# Patient Record
Sex: FEMALE | Race: WHITE | Employment: UNEMPLOYED | URBAN - METROPOLITAN AREA
[De-identification: names, ages, dates, MRNs, and addresses within clinical notes are randomized per-mention and may not be internally consistent; named-entity substitution may affect disease eponyms.]

---

## 2017-12-03 PROBLEM — Z15.89 MTHFR MUTATION: Status: ACTIVE | Noted: 2017-12-03

## 2018-12-21 ENCOUNTER — HOSPITAL ENCOUNTER (EMERGENCY)
Age: 48
Discharge: HOME | End: 2018-12-21
Payer: COMMERCIAL

## 2018-12-21 VITALS
DIASTOLIC BLOOD PRESSURE: 98 MMHG | HEIGHT: 63 IN | RESPIRATION RATE: 18 BRPM | WEIGHT: 240 LBS | HEART RATE: 102 BPM | SYSTOLIC BLOOD PRESSURE: 133 MMHG | OXYGEN SATURATION: 96 % | TEMPERATURE: 98.6 F | BODY MASS INDEX: 42.52 KG/M2

## 2018-12-21 DIAGNOSIS — J06.9 ACUTE UPPER RESPIRATORY INFECTION: ICD-10-CM

## 2018-12-21 DIAGNOSIS — M79.601 RIGHT ARM PAIN: Primary | ICD-10-CM

## 2018-12-21 LAB
MONO TEST: NEGATIVE
STREP GRP A PCR: NEGATIVE

## 2018-12-21 PROCEDURE — 87880 STREP A ASSAY W/OPTIC: CPT

## 2018-12-21 PROCEDURE — 6370000000 HC RX 637 (ALT 250 FOR IP): Performed by: NURSE PRACTITIONER

## 2018-12-21 PROCEDURE — 36415 COLL VENOUS BLD VENIPUNCTURE: CPT

## 2018-12-21 PROCEDURE — 99283 EMERGENCY DEPT VISIT LOW MDM: CPT

## 2018-12-21 PROCEDURE — 86308 HETEROPHILE ANTIBODY SCREEN: CPT

## 2018-12-21 RX ORDER — METOPROLOL SUCCINATE 25 MG/1
50 TABLET, EXTENDED RELEASE ORAL ONCE
Status: COMPLETED | OUTPATIENT
Start: 2018-12-21 | End: 2018-12-21

## 2018-12-21 RX ORDER — IBUPROFEN 400 MG/1
400 TABLET ORAL ONCE
Status: COMPLETED | OUTPATIENT
Start: 2018-12-21 | End: 2018-12-21

## 2018-12-21 RX ADMIN — METOPROLOL SUCCINATE 50 MG: 25 TABLET, EXTENDED RELEASE ORAL at 00:39

## 2018-12-21 RX ADMIN — IBUPROFEN 400 MG: 400 TABLET, FILM COATED ORAL at 00:38

## 2018-12-21 ASSESSMENT — PAIN SCALES - GENERAL
PAINLEVEL_OUTOF10: 4
PAINLEVEL_OUTOF10: 7

## 2018-12-21 ASSESSMENT — PAIN DESCRIPTION - ONSET: ONSET: ON-GOING

## 2018-12-21 ASSESSMENT — PAIN DESCRIPTION - ORIENTATION: ORIENTATION: RIGHT;LEFT

## 2018-12-21 ASSESSMENT — PAIN DESCRIPTION - PAIN TYPE: TYPE: ACUTE PAIN

## 2018-12-21 ASSESSMENT — PAIN DESCRIPTION - FREQUENCY: FREQUENCY: CONTINUOUS

## 2018-12-21 ASSESSMENT — PAIN DESCRIPTION - DESCRIPTORS: DESCRIPTORS: ACHING

## 2019-01-09 ENCOUNTER — HOSPITAL ENCOUNTER (OUTPATIENT)
Age: 49
Discharge: HOME | End: 2019-01-09
Payer: COMMERCIAL

## 2019-01-09 LAB
ALBUMIN SERPL-MCNC: 4.4 G/DL (ref 3.5–5.2)
ALP BLD-CCNC: 99 U/L (ref 35–104)
ALT SERPL-CCNC: 24 U/L (ref 0–32)
ANION GAP SERPL CALCULATED.3IONS-SCNC: 14 MMOL/L (ref 7–16)
AST SERPL-CCNC: 28 U/L (ref 0–31)
BACTERIA: ABNORMAL /HPF
BASOPHILS ABSOLUTE: 0.06 E9/L (ref 0–0.2)
BASOPHILS RELATIVE PERCENT: 0.6 % (ref 0–2)
BILIRUB SERPL-MCNC: 0.5 MG/DL (ref 0–1.2)
BILIRUBIN URINE: NEGATIVE
BLOOD, URINE: NEGATIVE
BUN BLDV-MCNC: 16 MG/DL (ref 6–20)
CALCIUM SERPL-MCNC: 9.4 MG/DL (ref 8.6–10.2)
CASTS: ABNORMAL /LPF
CHLORIDE BLD-SCNC: 96 MMOL/L (ref 98–107)
CHOLESTEROL, FASTING: 385 MG/DL (ref 0–199)
CLARITY: ABNORMAL
CO2: 28 MMOL/L (ref 22–29)
COLOR: YELLOW
CREAT SERPL-MCNC: 0.8 MG/DL (ref 0.5–1)
EOSINOPHILS ABSOLUTE: 0.18 E9/L (ref 0.05–0.5)
EOSINOPHILS RELATIVE PERCENT: 1.9 % (ref 0–6)
EPITHELIAL CELLS, UA: ABNORMAL /HPF
GFR AFRICAN AMERICAN: >60
GFR NON-AFRICAN AMERICAN: >60 ML/MIN/1.73
GLUCOSE FASTING: 257 MG/DL (ref 74–99)
GLUCOSE URINE: 100 MG/DL
HBA1C MFR BLD: 10 % (ref 4–5.6)
HCT VFR BLD CALC: 45 % (ref 34–48)
HDLC SERPL-MCNC: 45 MG/DL
HEMOGLOBIN: 15.1 G/DL (ref 11.5–15.5)
IMMATURE GRANULOCYTES #: 0.05 E9/L
IMMATURE GRANULOCYTES %: 0.5 % (ref 0–5)
KETONES, URINE: NEGATIVE MG/DL
LDL CHOLESTEROL CALCULATED: ABNORMAL MG/DL (ref 0–99)
LEUKOCYTE ESTERASE, URINE: NEGATIVE
LYMPHOCYTES ABSOLUTE: 2.58 E9/L (ref 1.5–4)
LYMPHOCYTES RELATIVE PERCENT: 27.5 % (ref 20–42)
MCH RBC QN AUTO: 28.5 PG (ref 26–35)
MCHC RBC AUTO-ENTMCNC: 33.6 % (ref 32–34.5)
MCV RBC AUTO: 84.9 FL (ref 80–99.9)
MICROALBUMIN UR-MCNC: 61.5 MG/L
MONOCYTES ABSOLUTE: 0.43 E9/L (ref 0.1–0.95)
MONOCYTES RELATIVE PERCENT: 4.6 % (ref 2–12)
NEUTROPHILS ABSOLUTE: 6.09 E9/L (ref 1.8–7.3)
NEUTROPHILS RELATIVE PERCENT: 64.9 % (ref 43–80)
NITRITE, URINE: NEGATIVE
PDW BLD-RTO: 12.1 FL (ref 11.5–15)
PH UA: 6 (ref 5–9)
PLATELET # BLD: 344 E9/L (ref 130–450)
PMV BLD AUTO: 10 FL (ref 7–12)
POTASSIUM SERPL-SCNC: 4.5 MMOL/L (ref 3.5–5)
PROTEIN UA: NEGATIVE MG/DL
RBC # BLD: 5.3 E12/L (ref 3.5–5.5)
RBC UA: ABNORMAL /HPF (ref 0–2)
SODIUM BLD-SCNC: 138 MMOL/L (ref 132–146)
SPECIFIC GRAVITY UA: 1.01 (ref 1–1.03)
TOTAL PROTEIN: 8.2 G/DL (ref 6.4–8.3)
TRIGLYCERIDE, FASTING: 659 MG/DL (ref 0–149)
TSH SERPL DL<=0.05 MIU/L-ACNC: 2.03 UIU/ML (ref 0.27–4.2)
UROBILINOGEN, URINE: 0.2 E.U./DL
VITAMIN D 25-HYDROXY: 15 NG/ML (ref 30–100)
VLDLC SERPL CALC-MCNC: ABNORMAL MG/DL
WBC # BLD: 9.4 E9/L (ref 4.5–11.5)
WBC UA: ABNORMAL /HPF (ref 0–5)

## 2019-01-09 PROCEDURE — 82306 VITAMIN D 25 HYDROXY: CPT

## 2019-01-09 PROCEDURE — 82044 UR ALBUMIN SEMIQUANTITATIVE: CPT

## 2019-01-09 PROCEDURE — 80053 COMPREHEN METABOLIC PANEL: CPT

## 2019-01-09 PROCEDURE — 36415 COLL VENOUS BLD VENIPUNCTURE: CPT

## 2019-01-09 PROCEDURE — 80061 LIPID PANEL: CPT

## 2019-01-09 PROCEDURE — 85025 COMPLETE CBC W/AUTO DIFF WBC: CPT

## 2019-01-09 PROCEDURE — 83036 HEMOGLOBIN GLYCOSYLATED A1C: CPT

## 2019-01-09 PROCEDURE — 84443 ASSAY THYROID STIM HORMONE: CPT

## 2019-01-09 PROCEDURE — 81001 URINALYSIS AUTO W/SCOPE: CPT

## 2019-01-14 ENCOUNTER — APPOINTMENT (OUTPATIENT)
Dept: CT IMAGING | Age: 49
DRG: 445 | End: 2019-01-14
Payer: COMMERCIAL

## 2019-01-14 ENCOUNTER — HOSPITAL ENCOUNTER (INPATIENT)
Age: 49
LOS: 1 days | Discharge: OTHER INSTITUTION | DRG: 445 | End: 2019-01-16
Attending: EMERGENCY MEDICINE | Admitting: INTERNAL MEDICINE
Payer: COMMERCIAL

## 2019-01-14 DIAGNOSIS — R00.0 TACHYCARDIA: ICD-10-CM

## 2019-01-14 DIAGNOSIS — E87.20 LACTIC ACIDOSIS: ICD-10-CM

## 2019-01-14 DIAGNOSIS — K83.8 PNEUMOBILIA: Primary | ICD-10-CM

## 2019-01-14 DIAGNOSIS — R74.8 ELEVATED LIVER ENZYMES: ICD-10-CM

## 2019-01-14 LAB
ALBUMIN SERPL-MCNC: 4.1 G/DL (ref 3.5–5.2)
ALP BLD-CCNC: 149 U/L (ref 35–104)
ALT SERPL-CCNC: 153 U/L (ref 0–32)
ANION GAP SERPL CALCULATED.3IONS-SCNC: 18 MMOL/L (ref 7–16)
AST SERPL-CCNC: 653 U/L (ref 0–31)
BACTERIA: ABNORMAL /HPF
BASOPHILS ABSOLUTE: 0.03 E9/L (ref 0–0.2)
BASOPHILS RELATIVE PERCENT: 0.3 % (ref 0–2)
BILIRUB SERPL-MCNC: 1.4 MG/DL (ref 0–1.2)
BILIRUBIN DIRECT: 1.1 MG/DL (ref 0–0.3)
BILIRUBIN URINE: NEGATIVE
BILIRUBIN, INDIRECT: 0.3 MG/DL (ref 0–1)
BLOOD, URINE: NEGATIVE
BUN BLDV-MCNC: 15 MG/DL (ref 6–20)
CALCIUM SERPL-MCNC: 9.5 MG/DL (ref 8.6–10.2)
CHLORIDE BLD-SCNC: 92 MMOL/L (ref 98–107)
CLARITY: ABNORMAL
CO2: 24 MMOL/L (ref 22–29)
COLOR: YELLOW
CREAT SERPL-MCNC: 0.7 MG/DL (ref 0.5–1)
EOSINOPHILS ABSOLUTE: 0.11 E9/L (ref 0.05–0.5)
EOSINOPHILS RELATIVE PERCENT: 1.3 % (ref 0–6)
GFR AFRICAN AMERICAN: >60
GFR NON-AFRICAN AMERICAN: >60 ML/MIN/1.73
GLUCOSE BLD-MCNC: 355 MG/DL (ref 74–99)
GLUCOSE URINE: >=1000 MG/DL
HCT VFR BLD CALC: 43.9 % (ref 34–48)
HEMOGLOBIN: 15 G/DL (ref 11.5–15.5)
IMMATURE GRANULOCYTES #: 0.03 E9/L
IMMATURE GRANULOCYTES %: 0.3 % (ref 0–5)
KETONES, URINE: ABNORMAL MG/DL
LACTIC ACID: 4.7 MMOL/L (ref 0.5–2.2)
LACTIC ACID: 6 MMOL/L (ref 0.5–2.2)
LEUKOCYTE ESTERASE, URINE: NEGATIVE
LIPASE: 84 U/L (ref 13–60)
LYMPHOCYTES ABSOLUTE: 1.27 E9/L (ref 1.5–4)
LYMPHOCYTES RELATIVE PERCENT: 14.6 % (ref 20–42)
MCH RBC QN AUTO: 28.8 PG (ref 26–35)
MCHC RBC AUTO-ENTMCNC: 34.2 % (ref 32–34.5)
MCV RBC AUTO: 84.3 FL (ref 80–99.9)
MONOCYTES ABSOLUTE: 0.3 E9/L (ref 0.1–0.95)
MONOCYTES RELATIVE PERCENT: 3.4 % (ref 2–12)
NEUTROPHILS ABSOLUTE: 6.97 E9/L (ref 1.8–7.3)
NEUTROPHILS RELATIVE PERCENT: 80.1 % (ref 43–80)
NITRITE, URINE: NEGATIVE
PDW BLD-RTO: 12 FL (ref 11.5–15)
PH UA: 6 (ref 5–9)
PLATELET # BLD: 338 E9/L (ref 130–450)
PMV BLD AUTO: 10.1 FL (ref 7–12)
POTASSIUM REFLEX MAGNESIUM: 4.1 MMOL/L (ref 3.5–5)
PROTEIN UA: ABNORMAL MG/DL
RBC # BLD: 5.21 E12/L (ref 3.5–5.5)
RBC UA: ABNORMAL /HPF (ref 0–2)
SODIUM BLD-SCNC: 134 MMOL/L (ref 132–146)
SPECIFIC GRAVITY UA: 1.02 (ref 1–1.03)
TOTAL PROTEIN: 7.8 G/DL (ref 6.4–8.3)
UROBILINOGEN, URINE: 0.2 E.U./DL
WBC # BLD: 8.7 E9/L (ref 4.5–11.5)
WBC UA: ABNORMAL /HPF (ref 0–5)

## 2019-01-14 PROCEDURE — 80076 HEPATIC FUNCTION PANEL: CPT

## 2019-01-14 PROCEDURE — 81001 URINALYSIS AUTO W/SCOPE: CPT

## 2019-01-14 PROCEDURE — 2580000003 HC RX 258: Performed by: STUDENT IN AN ORGANIZED HEALTH CARE EDUCATION/TRAINING PROGRAM

## 2019-01-14 PROCEDURE — 74176 CT ABD & PELVIS W/O CONTRAST: CPT

## 2019-01-14 PROCEDURE — S0028 INJECTION, FAMOTIDINE, 20 MG: HCPCS | Performed by: STUDENT IN AN ORGANIZED HEALTH CARE EDUCATION/TRAINING PROGRAM

## 2019-01-14 PROCEDURE — 6360000002 HC RX W HCPCS: Performed by: STUDENT IN AN ORGANIZED HEALTH CARE EDUCATION/TRAINING PROGRAM

## 2019-01-14 PROCEDURE — 83690 ASSAY OF LIPASE: CPT

## 2019-01-14 PROCEDURE — 96375 TX/PRO/DX INJ NEW DRUG ADDON: CPT

## 2019-01-14 PROCEDURE — 36415 COLL VENOUS BLD VENIPUNCTURE: CPT

## 2019-01-14 PROCEDURE — 85025 COMPLETE CBC W/AUTO DIFF WBC: CPT

## 2019-01-14 PROCEDURE — 99285 EMERGENCY DEPT VISIT HI MDM: CPT

## 2019-01-14 PROCEDURE — 80048 BASIC METABOLIC PNL TOTAL CA: CPT

## 2019-01-14 PROCEDURE — 6360000002 HC RX W HCPCS: Performed by: EMERGENCY MEDICINE

## 2019-01-14 PROCEDURE — 83605 ASSAY OF LACTIC ACID: CPT

## 2019-01-14 PROCEDURE — 96374 THER/PROPH/DIAG INJ IV PUSH: CPT

## 2019-01-14 RX ORDER — FENTANYL CITRATE 50 UG/ML
50 INJECTION, SOLUTION INTRAMUSCULAR; INTRAVENOUS ONCE
Status: COMPLETED | OUTPATIENT
Start: 2019-01-14 | End: 2019-01-14

## 2019-01-14 RX ORDER — 0.9 % SODIUM CHLORIDE 0.9 %
1000 INTRAVENOUS SOLUTION INTRAVENOUS ONCE
Status: COMPLETED | OUTPATIENT
Start: 2019-01-14 | End: 2019-01-14

## 2019-01-14 RX ORDER — METOCLOPRAMIDE HYDROCHLORIDE 5 MG/ML
10 INJECTION INTRAMUSCULAR; INTRAVENOUS ONCE
Status: COMPLETED | OUTPATIENT
Start: 2019-01-14 | End: 2019-01-14

## 2019-01-14 RX ORDER — 0.9 % SODIUM CHLORIDE 0.9 %
1000 INTRAVENOUS SOLUTION INTRAVENOUS ONCE
Status: COMPLETED | OUTPATIENT
Start: 2019-01-14 | End: 2019-01-15

## 2019-01-14 RX ORDER — ONDANSETRON 2 MG/ML
4 INJECTION INTRAMUSCULAR; INTRAVENOUS ONCE
Status: COMPLETED | OUTPATIENT
Start: 2019-01-14 | End: 2019-01-14

## 2019-01-14 RX ORDER — FENTANYL CITRATE 50 UG/ML
50 INJECTION, SOLUTION INTRAMUSCULAR; INTRAVENOUS ONCE
Status: COMPLETED | OUTPATIENT
Start: 2019-01-15 | End: 2019-01-15

## 2019-01-14 RX ORDER — METOCLOPRAMIDE HYDROCHLORIDE 5 MG/ML
10 INJECTION INTRAMUSCULAR; INTRAVENOUS ONCE
Status: COMPLETED | OUTPATIENT
Start: 2019-01-15 | End: 2019-01-15

## 2019-01-14 RX ADMIN — ONDANSETRON 4 MG: 2 INJECTION INTRAMUSCULAR; INTRAVENOUS at 23:05

## 2019-01-14 RX ADMIN — METOCLOPRAMIDE 10 MG: 5 INJECTION, SOLUTION INTRAMUSCULAR; INTRAVENOUS at 21:17

## 2019-01-14 RX ADMIN — SODIUM CHLORIDE 1000 ML: 9 INJECTION, SOLUTION INTRAVENOUS at 21:17

## 2019-01-14 RX ADMIN — SODIUM CHLORIDE 1000 ML: 9 INJECTION, SOLUTION INTRAVENOUS at 23:06

## 2019-01-14 RX ADMIN — FAMOTIDINE 20 MG: 10 INJECTION, SOLUTION INTRAVENOUS at 21:17

## 2019-01-14 RX ADMIN — FENTANYL CITRATE 50 MCG: 50 INJECTION, SOLUTION INTRAMUSCULAR; INTRAVENOUS at 21:17

## 2019-01-14 ASSESSMENT — ENCOUNTER SYMPTOMS
EYE DISCHARGE: 0
EYE PAIN: 0
WHEEZING: 0
NAUSEA: 1
SINUS PRESSURE: 0
DIARRHEA: 0
COUGH: 0
ABDOMINAL PAIN: 1
ABDOMINAL DISTENTION: 0
EYE REDNESS: 0
SHORTNESS OF BREATH: 0
VOMITING: 0
SORE THROAT: 0
BACK PAIN: 0

## 2019-01-14 ASSESSMENT — PAIN DESCRIPTION - LOCATION
LOCATION: ABDOMEN
LOCATION: ABDOMEN

## 2019-01-14 ASSESSMENT — PAIN SCALES - GENERAL
PAINLEVEL_OUTOF10: 8
PAINLEVEL_OUTOF10: 8

## 2019-01-14 ASSESSMENT — PAIN DESCRIPTION - ORIENTATION
ORIENTATION: RIGHT
ORIENTATION: RIGHT;MID;UPPER

## 2019-01-14 ASSESSMENT — PAIN DESCRIPTION - PAIN TYPE
TYPE: ACUTE PAIN
TYPE: ACUTE PAIN

## 2019-01-14 ASSESSMENT — PAIN DESCRIPTION - DESCRIPTORS
DESCRIPTORS: CONSTANT
DESCRIPTORS: DISCOMFORT

## 2019-01-14 ASSESSMENT — PAIN DESCRIPTION - FREQUENCY: FREQUENCY: CONTINUOUS

## 2019-01-15 ENCOUNTER — APPOINTMENT (OUTPATIENT)
Dept: CT IMAGING | Age: 49
DRG: 445 | End: 2019-01-15
Payer: COMMERCIAL

## 2019-01-15 PROBLEM — K83.8 PNEUMOBILIA: Status: ACTIVE | Noted: 2019-01-15

## 2019-01-15 LAB
CHP ED QC CHECK: NORMAL
GLUCOSE BLD-MCNC: 289 MG/DL
LACTIC ACID: 3.4 MMOL/L (ref 0.5–2.2)
LACTIC ACID: 5 MMOL/L (ref 0.5–2.2)
LACTIC ACID: 5 MMOL/L (ref 0.5–2.2)
METER GLUCOSE: 200 MG/DL (ref 74–99)
METER GLUCOSE: 289 MG/DL (ref 74–99)

## 2019-01-15 PROCEDURE — 2580000003 HC RX 258: Performed by: INTERNAL MEDICINE

## 2019-01-15 PROCEDURE — 2580000003 HC RX 258: Performed by: STUDENT IN AN ORGANIZED HEALTH CARE EDUCATION/TRAINING PROGRAM

## 2019-01-15 PROCEDURE — 74174 CTA ABD&PLVS W/CONTRAST: CPT

## 2019-01-15 PROCEDURE — 2580000003 HC RX 258: Performed by: EMERGENCY MEDICINE

## 2019-01-15 PROCEDURE — 6360000002 HC RX W HCPCS: Performed by: STUDENT IN AN ORGANIZED HEALTH CARE EDUCATION/TRAINING PROGRAM

## 2019-01-15 PROCEDURE — 36415 COLL VENOUS BLD VENIPUNCTURE: CPT

## 2019-01-15 PROCEDURE — 96375 TX/PRO/DX INJ NEW DRUG ADDON: CPT

## 2019-01-15 PROCEDURE — 96376 TX/PRO/DX INJ SAME DRUG ADON: CPT

## 2019-01-15 PROCEDURE — 6360000002 HC RX W HCPCS: Performed by: INTERNAL MEDICINE

## 2019-01-15 PROCEDURE — 6370000000 HC RX 637 (ALT 250 FOR IP): Performed by: INTERNAL MEDICINE

## 2019-01-15 PROCEDURE — 6360000002 HC RX W HCPCS: Performed by: EMERGENCY MEDICINE

## 2019-01-15 PROCEDURE — 6370000000 HC RX 637 (ALT 250 FOR IP): Performed by: STUDENT IN AN ORGANIZED HEALTH CARE EDUCATION/TRAINING PROGRAM

## 2019-01-15 PROCEDURE — 6360000004 HC RX CONTRAST MEDICATION: Performed by: RADIOLOGY

## 2019-01-15 PROCEDURE — 82962 GLUCOSE BLOOD TEST: CPT

## 2019-01-15 PROCEDURE — 83605 ASSAY OF LACTIC ACID: CPT

## 2019-01-15 PROCEDURE — 2000000000 HC ICU R&B

## 2019-01-15 RX ORDER — NICOTINE POLACRILEX 4 MG
15 LOZENGE BUCCAL PRN
Status: DISCONTINUED | OUTPATIENT
Start: 2019-01-15 | End: 2019-01-16 | Stop reason: HOSPADM

## 2019-01-15 RX ORDER — ONDANSETRON 2 MG/ML
4 INJECTION INTRAMUSCULAR; INTRAVENOUS ONCE
Status: COMPLETED | OUTPATIENT
Start: 2019-01-15 | End: 2019-01-15

## 2019-01-15 RX ORDER — DEXTROSE MONOHYDRATE 25 G/50ML
12.5 INJECTION, SOLUTION INTRAVENOUS PRN
Status: DISCONTINUED | OUTPATIENT
Start: 2019-01-15 | End: 2019-01-16 | Stop reason: HOSPADM

## 2019-01-15 RX ORDER — ONDANSETRON 2 MG/ML
4 INJECTION INTRAMUSCULAR; INTRAVENOUS EVERY 6 HOURS PRN
Status: DISCONTINUED | OUTPATIENT
Start: 2019-01-15 | End: 2019-01-16 | Stop reason: HOSPADM

## 2019-01-15 RX ORDER — SODIUM CHLORIDE 0.9 % (FLUSH) 0.9 %
10 SYRINGE (ML) INJECTION EVERY 12 HOURS SCHEDULED
Status: DISCONTINUED | OUTPATIENT
Start: 2019-01-15 | End: 2019-01-16 | Stop reason: HOSPADM

## 2019-01-15 RX ORDER — MEPERIDINE HYDROCHLORIDE 25 MG/ML
25 INJECTION INTRAMUSCULAR; INTRAVENOUS; SUBCUTANEOUS EVERY 4 HOURS PRN
Status: DISCONTINUED | OUTPATIENT
Start: 2019-01-15 | End: 2019-01-16 | Stop reason: HOSPADM

## 2019-01-15 RX ORDER — 0.9 % SODIUM CHLORIDE 0.9 %
1000 INTRAVENOUS SOLUTION INTRAVENOUS ONCE
Status: COMPLETED | OUTPATIENT
Start: 2019-01-15 | End: 2019-01-15

## 2019-01-15 RX ORDER — PROMETHAZINE HYDROCHLORIDE 25 MG/ML
25 INJECTION, SOLUTION INTRAMUSCULAR; INTRAVENOUS EVERY 6 HOURS PRN
Status: DISCONTINUED | OUTPATIENT
Start: 2019-01-15 | End: 2019-01-16 | Stop reason: HOSPADM

## 2019-01-15 RX ORDER — PANTOPRAZOLE SODIUM 40 MG/1
40 TABLET, DELAYED RELEASE ORAL NIGHTLY
Status: DISCONTINUED | OUTPATIENT
Start: 2019-01-15 | End: 2019-01-16 | Stop reason: HOSPADM

## 2019-01-15 RX ORDER — ASPIRIN 81 MG/1
81 TABLET, CHEWABLE ORAL NIGHTLY
Status: DISCONTINUED | OUTPATIENT
Start: 2019-01-15 | End: 2019-01-16 | Stop reason: HOSPADM

## 2019-01-15 RX ORDER — PROMETHAZINE HYDROCHLORIDE 25 MG/ML
12.5 INJECTION, SOLUTION INTRAMUSCULAR; INTRAVENOUS ONCE
Status: COMPLETED | OUTPATIENT
Start: 2019-01-15 | End: 2019-01-15

## 2019-01-15 RX ORDER — ACETAMINOPHEN 500 MG
1000 TABLET ORAL ONCE
Status: COMPLETED | OUTPATIENT
Start: 2019-01-15 | End: 2019-01-15

## 2019-01-15 RX ORDER — PROMETHAZINE HYDROCHLORIDE 25 MG/ML
25 INJECTION, SOLUTION INTRAMUSCULAR; INTRAVENOUS ONCE
Status: COMPLETED | OUTPATIENT
Start: 2019-01-15 | End: 2019-01-15

## 2019-01-15 RX ORDER — SODIUM CHLORIDE 9 MG/ML
INJECTION, SOLUTION INTRAVENOUS CONTINUOUS
Status: DISCONTINUED | OUTPATIENT
Start: 2019-01-15 | End: 2019-01-16 | Stop reason: HOSPADM

## 2019-01-15 RX ORDER — ONDANSETRON 2 MG/ML
8 INJECTION INTRAMUSCULAR; INTRAVENOUS ONCE
Status: COMPLETED | OUTPATIENT
Start: 2019-01-15 | End: 2019-01-15

## 2019-01-15 RX ORDER — DEXTROSE MONOHYDRATE 50 MG/ML
100 INJECTION, SOLUTION INTRAVENOUS PRN
Status: DISCONTINUED | OUTPATIENT
Start: 2019-01-15 | End: 2019-01-16 | Stop reason: HOSPADM

## 2019-01-15 RX ORDER — METOPROLOL SUCCINATE 50 MG/1
50 TABLET, EXTENDED RELEASE ORAL 2 TIMES DAILY
Status: DISCONTINUED | OUTPATIENT
Start: 2019-01-15 | End: 2019-01-16 | Stop reason: HOSPADM

## 2019-01-15 RX ORDER — ONDANSETRON 2 MG/ML
4 INJECTION INTRAMUSCULAR; INTRAVENOUS EVERY 6 HOURS PRN
Status: DISCONTINUED | OUTPATIENT
Start: 2019-01-15 | End: 2019-01-15

## 2019-01-15 RX ORDER — SODIUM CHLORIDE 0.9 % (FLUSH) 0.9 %
10 SYRINGE (ML) INJECTION PRN
Status: DISCONTINUED | OUTPATIENT
Start: 2019-01-15 | End: 2019-01-16 | Stop reason: HOSPADM

## 2019-01-15 RX ADMIN — ASPIRIN 81 MG: 81 TABLET, CHEWABLE ORAL at 23:05

## 2019-01-15 RX ADMIN — PIPERACILLIN SODIUM AND TAZOBACTAM SODIUM 4.5 G: 4; .5 INJECTION, POWDER, LYOPHILIZED, FOR SOLUTION INTRAVENOUS at 15:15

## 2019-01-15 RX ADMIN — FENTANYL CITRATE 50 MCG: 50 INJECTION, SOLUTION INTRAMUSCULAR; INTRAVENOUS at 00:06

## 2019-01-15 RX ADMIN — METOCLOPRAMIDE 10 MG: 5 INJECTION, SOLUTION INTRAMUSCULAR; INTRAVENOUS at 00:06

## 2019-01-15 RX ADMIN — ONDANSETRON 4 MG: 2 INJECTION INTRAMUSCULAR; INTRAVENOUS at 08:41

## 2019-01-15 RX ADMIN — Medication 10 ML: at 23:05

## 2019-01-15 RX ADMIN — METOPROLOL SUCCINATE 50 MG: 50 TABLET, EXTENDED RELEASE ORAL at 23:05

## 2019-01-15 RX ADMIN — SODIUM CHLORIDE: 9 INJECTION, SOLUTION INTRAVENOUS at 13:42

## 2019-01-15 RX ADMIN — PROMETHAZINE HYDROCHLORIDE 12.5 MG: 25 INJECTION INTRAMUSCULAR; INTRAVENOUS at 02:12

## 2019-01-15 RX ADMIN — ONDANSETRON 4 MG: 2 INJECTION INTRAMUSCULAR; INTRAVENOUS at 19:14

## 2019-01-15 RX ADMIN — IOPAMIDOL 125 ML: 755 INJECTION, SOLUTION INTRAVENOUS at 20:01

## 2019-01-15 RX ADMIN — PANTOPRAZOLE SODIUM 40 MG: 40 TABLET, DELAYED RELEASE ORAL at 23:05

## 2019-01-15 RX ADMIN — ACETAMINOPHEN 1000 MG: 500 TABLET, FILM COATED ORAL at 02:18

## 2019-01-15 RX ADMIN — SODIUM CHLORIDE 1000 ML: 9 INJECTION, SOLUTION INTRAVENOUS at 01:05

## 2019-01-15 RX ADMIN — DEXTROSE MONOHYDRATE 3.38 G: 50 INJECTION, SOLUTION INTRAVENOUS at 23:05

## 2019-01-15 RX ADMIN — PROMETHAZINE HYDROCHLORIDE 25 MG: 25 INJECTION INTRAMUSCULAR; INTRAVENOUS at 12:30

## 2019-01-15 RX ADMIN — INSULIN LISPRO 2 UNITS: 100 INJECTION, SOLUTION INTRAVENOUS; SUBCUTANEOUS at 23:21

## 2019-01-15 RX ADMIN — ONDANSETRON 8 MG: 2 INJECTION INTRAMUSCULAR; INTRAVENOUS at 01:05

## 2019-01-15 RX ADMIN — SODIUM CHLORIDE 1000 ML: 9 INJECTION, SOLUTION INTRAVENOUS at 08:04

## 2019-01-15 ASSESSMENT — PAIN SCALES - GENERAL
PAINLEVEL_OUTOF10: 3
PAINLEVEL_OUTOF10: 0
PAINLEVEL_OUTOF10: 5

## 2019-01-15 ASSESSMENT — PAIN DESCRIPTION - PAIN TYPE
TYPE: ACUTE PAIN
TYPE: ACUTE PAIN

## 2019-01-15 ASSESSMENT — PAIN DESCRIPTION - LOCATION
LOCATION: ABDOMEN
LOCATION: ABDOMEN

## 2019-01-15 ASSESSMENT — PAIN DESCRIPTION - DESCRIPTORS
DESCRIPTORS: DISCOMFORT
DESCRIPTORS: DISCOMFORT

## 2019-01-16 ENCOUNTER — HOSPITAL ENCOUNTER (INPATIENT)
Age: 49
LOS: 2 days | Discharge: HOME | DRG: 445 | End: 2019-01-18
Attending: TRANSPLANT SURGERY | Admitting: TRANSPLANT SURGERY
Payer: COMMERCIAL

## 2019-01-16 ENCOUNTER — APPOINTMENT (OUTPATIENT)
Dept: MRI IMAGING | Age: 49
DRG: 445 | End: 2019-01-16
Payer: COMMERCIAL

## 2019-01-16 ENCOUNTER — HOSPITAL ENCOUNTER (OUTPATIENT)
Age: 49
Discharge: HOME | End: 2019-01-16
Payer: COMMERCIAL

## 2019-01-16 VITALS
OXYGEN SATURATION: 97 % | SYSTOLIC BLOOD PRESSURE: 101 MMHG | TEMPERATURE: 98.5 F | HEART RATE: 82 BPM | DIASTOLIC BLOOD PRESSURE: 69 MMHG | HEIGHT: 63 IN | RESPIRATION RATE: 16 BRPM | WEIGHT: 232.1 LBS | BODY MASS INDEX: 41.12 KG/M2

## 2019-01-16 PROBLEM — K83.1 OBSTRUCTIVE JAUNDICE: Status: ACTIVE | Noted: 2019-01-16

## 2019-01-16 PROBLEM — E66.01 MORBID OBESITY (HCC): Status: ACTIVE | Noted: 2019-01-16

## 2019-01-16 PROBLEM — I77.1 HEPATIC ARTERY STENOSIS (HCC): Status: ACTIVE | Noted: 2019-01-16

## 2019-01-16 LAB
ALBUMIN SERPL-MCNC: 2.7 G/DL (ref 3.5–5.2)
ALP BLD-CCNC: 129 U/L (ref 35–104)
ALT SERPL-CCNC: 219 U/L (ref 0–32)
AMYLASE: 16 U/L (ref 20–100)
ANION GAP SERPL CALCULATED.3IONS-SCNC: 12 MMOL/L (ref 7–16)
AST SERPL-CCNC: 251 U/L (ref 0–31)
BASOPHILS ABSOLUTE: 0.02 E9/L (ref 0–0.2)
BASOPHILS RELATIVE PERCENT: 0.3 % (ref 0–2)
BILIRUB SERPL-MCNC: 2.6 MG/DL (ref 0–1.2)
BUN BLDV-MCNC: 8 MG/DL (ref 6–20)
CALCIUM SERPL-MCNC: 8.1 MG/DL (ref 8.6–10.2)
CHLORIDE BLD-SCNC: 98 MMOL/L (ref 98–107)
CO2: 27 MMOL/L (ref 22–29)
CREAT SERPL-MCNC: 0.8 MG/DL (ref 0.5–1)
EOSINOPHILS ABSOLUTE: 0.04 E9/L (ref 0.05–0.5)
EOSINOPHILS RELATIVE PERCENT: 0.6 % (ref 0–6)
GFR AFRICAN AMERICAN: >60
GFR NON-AFRICAN AMERICAN: >60 ML/MIN/1.73
GLUCOSE BLD-MCNC: 207 MG/DL (ref 74–99)
HBA1C MFR BLD: 10.3 % (ref 4–5.6)
HCT VFR BLD CALC: 34.3 % (ref 34–48)
HEMOGLOBIN: 11.6 G/DL (ref 11.5–15.5)
IMMATURE GRANULOCYTES #: 0.03 E9/L
IMMATURE GRANULOCYTES %: 0.5 % (ref 0–5)
LACTIC ACID: 1.2 MMOL/L (ref 0.5–2.2)
LIPASE: 26 U/L (ref 13–60)
LYMPHOCYTES ABSOLUTE: 0.69 E9/L (ref 1.5–4)
LYMPHOCYTES RELATIVE PERCENT: 11.2 % (ref 20–42)
MAGNESIUM: 1.7 MG/DL (ref 1.6–2.6)
MCH RBC QN AUTO: 28.6 PG (ref 26–35)
MCHC RBC AUTO-ENTMCNC: 33.8 % (ref 32–34.5)
MCV RBC AUTO: 84.7 FL (ref 80–99.9)
METER GLUCOSE: 191 MG/DL (ref 74–99)
METER GLUCOSE: 218 MG/DL (ref 74–99)
METER GLUCOSE: 227 MG/DL (ref 74–99)
METER GLUCOSE: 270 MG/DL (ref 74–99)
MONOCYTES ABSOLUTE: 0.34 E9/L (ref 0.1–0.95)
MONOCYTES RELATIVE PERCENT: 5.5 % (ref 2–12)
NEUTROPHILS ABSOLUTE: 5.05 E9/L (ref 1.8–7.3)
NEUTROPHILS RELATIVE PERCENT: 81.9 % (ref 43–80)
PDW BLD-RTO: 12.7 FL (ref 11.5–15)
PHOSPHORUS: 2.7 MG/DL (ref 2.5–4.5)
PLATELET # BLD: 202 E9/L (ref 130–450)
PMV BLD AUTO: 10.1 FL (ref 7–12)
POTASSIUM REFLEX MAGNESIUM: 3.7 MMOL/L (ref 3.5–5)
RBC # BLD: 4.05 E12/L (ref 3.5–5.5)
SODIUM BLD-SCNC: 137 MMOL/L (ref 132–146)
T4 TOTAL: 5.4 MCG/DL (ref 4.5–11.7)
TOTAL PROTEIN: 6.3 G/DL (ref 6.4–8.3)
TSH SERPL DL<=0.05 MIU/L-ACNC: 1.22 UIU/ML (ref 0.27–4.2)
WBC # BLD: 6.2 E9/L (ref 4.5–11.5)

## 2019-01-16 PROCEDURE — 84436 ASSAY OF TOTAL THYROXINE: CPT

## 2019-01-16 PROCEDURE — 82962 GLUCOSE BLOOD TEST: CPT

## 2019-01-16 PROCEDURE — 2500000003 HC RX 250 WO HCPCS: Performed by: INTERNAL MEDICINE

## 2019-01-16 PROCEDURE — 87081 CULTURE SCREEN ONLY: CPT

## 2019-01-16 PROCEDURE — 83690 ASSAY OF LIPASE: CPT

## 2019-01-16 PROCEDURE — 2580000003 HC RX 258: Performed by: INTERNAL MEDICINE

## 2019-01-16 PROCEDURE — 2580000003 HC RX 258: Performed by: TRANSPLANT SURGERY

## 2019-01-16 PROCEDURE — 83735 ASSAY OF MAGNESIUM: CPT

## 2019-01-16 PROCEDURE — 85025 COMPLETE CBC W/AUTO DIFF WBC: CPT

## 2019-01-16 PROCEDURE — A0425 GROUND MILEAGE: HCPCS

## 2019-01-16 PROCEDURE — 6370000000 HC RX 637 (ALT 250 FOR IP): Performed by: TRANSPLANT SURGERY

## 2019-01-16 PROCEDURE — 6360000002 HC RX W HCPCS: Performed by: INTERNAL MEDICINE

## 2019-01-16 PROCEDURE — 6360000002 HC RX W HCPCS: Performed by: TRANSPLANT SURGERY

## 2019-01-16 PROCEDURE — 83036 HEMOGLOBIN GLYCOSYLATED A1C: CPT

## 2019-01-16 PROCEDURE — A0426 ALS 1: HCPCS

## 2019-01-16 PROCEDURE — C9113 INJ PANTOPRAZOLE SODIUM, VIA: HCPCS | Performed by: TRANSPLANT SURGERY

## 2019-01-16 PROCEDURE — 84443 ASSAY THYROID STIM HORMONE: CPT

## 2019-01-16 PROCEDURE — 36415 COLL VENOUS BLD VENIPUNCTURE: CPT

## 2019-01-16 PROCEDURE — 99222 1ST HOSP IP/OBS MODERATE 55: CPT | Performed by: TRANSPLANT SURGERY

## 2019-01-16 PROCEDURE — 82150 ASSAY OF AMYLASE: CPT

## 2019-01-16 PROCEDURE — 2060000000 HC ICU INTERMEDIATE R&B

## 2019-01-16 PROCEDURE — 74181 MRI ABDOMEN W/O CONTRAST: CPT

## 2019-01-16 PROCEDURE — 84100 ASSAY OF PHOSPHORUS: CPT

## 2019-01-16 PROCEDURE — 80053 COMPREHEN METABOLIC PANEL: CPT

## 2019-01-16 PROCEDURE — 83605 ASSAY OF LACTIC ACID: CPT

## 2019-01-16 PROCEDURE — 6370000000 HC RX 637 (ALT 250 FOR IP): Performed by: INTERNAL MEDICINE

## 2019-01-16 PROCEDURE — 2500000003 HC RX 250 WO HCPCS: Performed by: TRANSPLANT SURGERY

## 2019-01-16 RX ORDER — SODIUM CHLORIDE, SODIUM LACTATE, POTASSIUM CHLORIDE, CALCIUM CHLORIDE 600; 310; 30; 20 MG/100ML; MG/100ML; MG/100ML; MG/100ML
INJECTION, SOLUTION INTRAVENOUS CONTINUOUS
Status: DISCONTINUED | OUTPATIENT
Start: 2019-01-16 | End: 2019-01-17

## 2019-01-16 RX ORDER — SODIUM CHLORIDE 0.9 % (FLUSH) 0.9 %
10 SYRINGE (ML) INJECTION EVERY 12 HOURS SCHEDULED
Status: DISCONTINUED | OUTPATIENT
Start: 2019-01-16 | End: 2019-01-18 | Stop reason: HOSPADM

## 2019-01-16 RX ORDER — CIPROFLOXACIN 2 MG/ML
400 INJECTION, SOLUTION INTRAVENOUS EVERY 12 HOURS
Status: DISCONTINUED | OUTPATIENT
Start: 2019-01-16 | End: 2019-01-16

## 2019-01-16 RX ORDER — METOPROLOL SUCCINATE 50 MG/1
50 TABLET, EXTENDED RELEASE ORAL 2 TIMES DAILY
Status: DISCONTINUED | OUTPATIENT
Start: 2019-01-16 | End: 2019-01-18 | Stop reason: HOSPADM

## 2019-01-16 RX ORDER — TRIAMTERENE AND HYDROCHLOROTHIAZIDE 37.5; 25 MG/1; MG/1
2 TABLET ORAL DAILY
Status: DISCONTINUED | OUTPATIENT
Start: 2019-01-16 | End: 2019-01-18 | Stop reason: HOSPADM

## 2019-01-16 RX ORDER — 0.9 % SODIUM CHLORIDE 0.9 %
10 VIAL (ML) INJECTION DAILY
Status: DISCONTINUED | OUTPATIENT
Start: 2019-01-16 | End: 2019-01-18 | Stop reason: HOSPADM

## 2019-01-16 RX ORDER — LORAZEPAM 0.5 MG/1
0.5 TABLET ORAL ONCE
Status: COMPLETED | OUTPATIENT
Start: 2019-01-16 | End: 2019-01-16

## 2019-01-16 RX ORDER — FLUOXETINE HYDROCHLORIDE 20 MG/1
20 CAPSULE ORAL DAILY
Status: DISCONTINUED | OUTPATIENT
Start: 2019-01-16 | End: 2019-01-18 | Stop reason: HOSPADM

## 2019-01-16 RX ORDER — ONDANSETRON 2 MG/ML
4 INJECTION INTRAMUSCULAR; INTRAVENOUS EVERY 6 HOURS PRN
Status: DISCONTINUED | OUTPATIENT
Start: 2019-01-16 | End: 2019-01-18 | Stop reason: HOSPADM

## 2019-01-16 RX ORDER — DEXTROSE MONOHYDRATE 50 MG/ML
100 INJECTION, SOLUTION INTRAVENOUS PRN
Status: DISCONTINUED | OUTPATIENT
Start: 2019-01-16 | End: 2019-01-18 | Stop reason: HOSPADM

## 2019-01-16 RX ORDER — TIZANIDINE 4 MG/1
4 TABLET ORAL NIGHTLY
Status: DISCONTINUED | OUTPATIENT
Start: 2019-01-16 | End: 2019-01-18 | Stop reason: HOSPADM

## 2019-01-16 RX ORDER — ACETAMINOPHEN 325 MG/1
650 TABLET ORAL EVERY 4 HOURS PRN
Status: DISCONTINUED | OUTPATIENT
Start: 2019-01-16 | End: 2019-01-18 | Stop reason: HOSPADM

## 2019-01-16 RX ORDER — PANTOPRAZOLE SODIUM 40 MG/10ML
40 INJECTION, POWDER, LYOPHILIZED, FOR SOLUTION INTRAVENOUS DAILY
Status: DISCONTINUED | OUTPATIENT
Start: 2019-01-16 | End: 2019-01-18 | Stop reason: HOSPADM

## 2019-01-16 RX ORDER — SODIUM CHLORIDE 0.9 % (FLUSH) 0.9 %
10 SYRINGE (ML) INJECTION PRN
Status: DISCONTINUED | OUTPATIENT
Start: 2019-01-16 | End: 2019-01-18 | Stop reason: HOSPADM

## 2019-01-16 RX ORDER — NICOTINE POLACRILEX 4 MG
15 LOZENGE BUCCAL PRN
Status: DISCONTINUED | OUTPATIENT
Start: 2019-01-16 | End: 2019-01-18 | Stop reason: HOSPADM

## 2019-01-16 RX ORDER — FLUTICASONE PROPIONATE 50 MCG
2 SPRAY, SUSPENSION (ML) NASAL PRN
Status: DISCONTINUED | OUTPATIENT
Start: 2019-01-16 | End: 2019-01-18 | Stop reason: HOSPADM

## 2019-01-16 RX ORDER — DIPHENHYDRAMINE HCL 25 MG
25 TABLET ORAL NIGHTLY PRN
Status: DISCONTINUED | OUTPATIENT
Start: 2019-01-16 | End: 2019-01-18 | Stop reason: HOSPADM

## 2019-01-16 RX ORDER — DEXTROSE MONOHYDRATE 25 G/50ML
12.5 INJECTION, SOLUTION INTRAVENOUS PRN
Status: DISCONTINUED | OUTPATIENT
Start: 2019-01-16 | End: 2019-01-18 | Stop reason: HOSPADM

## 2019-01-16 RX ADMIN — METRONIDAZOLE 500 MG: 500 INJECTION, SOLUTION INTRAVENOUS at 18:59

## 2019-01-16 RX ADMIN — Medication 10 ML: at 20:25

## 2019-01-16 RX ADMIN — Medication 10 ML: at 09:23

## 2019-01-16 RX ADMIN — METOPROLOL SUCCINATE 50 MG: 50 TABLET, EXTENDED RELEASE ORAL at 20:25

## 2019-01-16 RX ADMIN — INSULIN LISPRO 2 UNITS: 100 INJECTION, SOLUTION INTRAVENOUS; SUBCUTANEOUS at 18:59

## 2019-01-16 RX ADMIN — LORAZEPAM 0.5 MG: 0.5 TABLET ORAL at 11:20

## 2019-01-16 RX ADMIN — INSULIN LISPRO 4 UNITS: 100 INJECTION, SOLUTION INTRAVENOUS; SUBCUTANEOUS at 07:48

## 2019-01-16 RX ADMIN — INSULIN LISPRO 2 UNITS: 100 INJECTION, SOLUTION INTRAVENOUS; SUBCUTANEOUS at 20:28

## 2019-01-16 RX ADMIN — SODIUM CHLORIDE, POTASSIUM CHLORIDE, SODIUM LACTATE AND CALCIUM CHLORIDE: 600; 310; 30; 20 INJECTION, SOLUTION INTRAVENOUS at 18:59

## 2019-01-16 RX ADMIN — PANTOPRAZOLE SODIUM 40 MG: 40 INJECTION, POWDER, FOR SOLUTION INTRAVENOUS at 18:59

## 2019-01-16 RX ADMIN — ONDANSETRON 4 MG: 2 INJECTION INTRAMUSCULAR; INTRAVENOUS at 11:34

## 2019-01-16 RX ADMIN — INSULIN LISPRO 2 UNITS: 100 INJECTION, SOLUTION INTRAVENOUS; SUBCUTANEOUS at 11:38

## 2019-01-16 RX ADMIN — TAZOBACTAM SODIUM AND PIPERACILLIN SODIUM 3.38 G: 375; 3 INJECTION, SOLUTION INTRAVENOUS at 14:42

## 2019-01-16 RX ADMIN — TIZANIDINE 4 MG: 4 TABLET ORAL at 20:25

## 2019-01-16 RX ADMIN — ENOXAPARIN SODIUM 40 MG: 40 INJECTION SUBCUTANEOUS at 09:21

## 2019-01-16 RX ADMIN — METOPROLOL SUCCINATE 50 MG: 50 TABLET, EXTENDED RELEASE ORAL at 09:22

## 2019-01-16 RX ADMIN — TAZOBACTAM SODIUM AND PIPERACILLIN SODIUM 3.38 G: 375; 3 INJECTION, SOLUTION INTRAVENOUS at 07:45

## 2019-01-16 RX ADMIN — CEFTRIAXONE SODIUM 2 G: 2 INJECTION, POWDER, FOR SOLUTION INTRAMUSCULAR; INTRAVENOUS at 20:25

## 2019-01-16 RX ADMIN — Medication 10 ML: at 20:04

## 2019-01-16 ASSESSMENT — ENCOUNTER SYMPTOMS
CONSTIPATION: 0
EYE PAIN: 0
BACK PAIN: 0
ABDOMINAL PAIN: 1
ABDOMINAL DISTENTION: 1
DIARRHEA: 0
BLOOD IN STOOL: 0
RESPIRATORY NEGATIVE: 1
EYE DISCHARGE: 0
ALLERGIC/IMMUNOLOGIC NEGATIVE: 1
VOMITING: 1
SHORTNESS OF BREATH: 0
EYES NEGATIVE: 1
NAUSEA: 1
PHOTOPHOBIA: 0

## 2019-01-16 ASSESSMENT — PAIN SCALES - GENERAL
PAINLEVEL_OUTOF10: 0

## 2019-01-16 ASSESSMENT — PAIN DESCRIPTION - PAIN TYPE: TYPE: ACUTE PAIN

## 2019-01-16 ASSESSMENT — PAIN DESCRIPTION - ORIENTATION: ORIENTATION: MID

## 2019-01-16 ASSESSMENT — PAIN DESCRIPTION - FREQUENCY: FREQUENCY: INTERMITTENT

## 2019-01-16 ASSESSMENT — PAIN DESCRIPTION - DESCRIPTORS: DESCRIPTORS: BURNING

## 2019-01-16 ASSESSMENT — PAIN DESCRIPTION - LOCATION: LOCATION: ABDOMEN

## 2019-01-16 ASSESSMENT — PAIN DESCRIPTION - ONSET: ONSET: ON-GOING

## 2019-01-16 ASSESSMENT — PAIN DESCRIPTION - PROGRESSION: CLINICAL_PROGRESSION: GRADUALLY IMPROVING

## 2019-01-17 LAB
ALBUMIN SERPL-MCNC: 2.9 G/DL (ref 3.5–5.2)
ALP BLD-CCNC: 139 U/L (ref 35–104)
ALT SERPL-CCNC: 149 U/L (ref 0–32)
AMYLASE: 20 U/L (ref 20–100)
ANION GAP SERPL CALCULATED.3IONS-SCNC: 10 MMOL/L (ref 7–16)
AST SERPL-CCNC: 96 U/L (ref 0–31)
BASOPHILS ABSOLUTE: 0.03 E9/L (ref 0–0.2)
BASOPHILS RELATIVE PERCENT: 0.6 % (ref 0–2)
BILIRUB SERPL-MCNC: 0.9 MG/DL (ref 0–1.2)
BILIRUBIN DIRECT: 0.5 MG/DL (ref 0–0.3)
BILIRUBIN, INDIRECT: 0.4 MG/DL (ref 0–1)
BUN BLDV-MCNC: 8 MG/DL (ref 6–20)
CALCIUM SERPL-MCNC: 8.6 MG/DL (ref 8.6–10.2)
CHLORIDE BLD-SCNC: 95 MMOL/L (ref 98–107)
CO2: 26 MMOL/L (ref 22–29)
CREAT SERPL-MCNC: 0.7 MG/DL (ref 0.5–1)
EOSINOPHILS ABSOLUTE: 0.13 E9/L (ref 0.05–0.5)
EOSINOPHILS RELATIVE PERCENT: 2.7 % (ref 0–6)
GFR AFRICAN AMERICAN: >60
GFR NON-AFRICAN AMERICAN: >60 ML/MIN/1.73
GLUCOSE BLD-MCNC: 280 MG/DL (ref 74–99)
HBA1C MFR BLD: 10.2 % (ref 4–5.6)
HCT VFR BLD CALC: 30.3 % (ref 34–48)
HEMOGLOBIN: 10.1 G/DL (ref 11.5–15.5)
IMMATURE GRANULOCYTES #: 0.03 E9/L
IMMATURE GRANULOCYTES %: 0.6 % (ref 0–5)
LIPASE: 50 U/L (ref 13–60)
LYMPHOCYTES ABSOLUTE: 0.77 E9/L (ref 1.5–4)
LYMPHOCYTES RELATIVE PERCENT: 15.7 % (ref 20–42)
MAGNESIUM: 1.6 MG/DL (ref 1.6–2.6)
MCH RBC QN AUTO: 29.2 PG (ref 26–35)
MCHC RBC AUTO-ENTMCNC: 33.3 % (ref 32–34.5)
MCV RBC AUTO: 87.6 FL (ref 80–99.9)
METER GLUCOSE: 244 MG/DL (ref 74–99)
METER GLUCOSE: 246 MG/DL (ref 74–99)
METER GLUCOSE: 300 MG/DL (ref 74–99)
METER GLUCOSE: 362 MG/DL (ref 74–99)
MONOCYTES ABSOLUTE: 0.3 E9/L (ref 0.1–0.95)
MONOCYTES RELATIVE PERCENT: 6.1 % (ref 2–12)
MRSA CULTURE ONLY: NORMAL
NEUTROPHILS ABSOLUTE: 3.63 E9/L (ref 1.8–7.3)
NEUTROPHILS RELATIVE PERCENT: 74.3 % (ref 43–80)
PDW BLD-RTO: 12.7 FL (ref 11.5–15)
PLATELET # BLD: 221 E9/L (ref 130–450)
PMV BLD AUTO: 10.2 FL (ref 7–12)
POTASSIUM REFLEX MAGNESIUM: 3.2 MMOL/L (ref 3.5–5)
RBC # BLD: 3.46 E12/L (ref 3.5–5.5)
SODIUM BLD-SCNC: 131 MMOL/L (ref 132–146)
TOTAL PROTEIN: 6.3 G/DL (ref 6.4–8.3)
WBC # BLD: 4.9 E9/L (ref 4.5–11.5)

## 2019-01-17 PROCEDURE — 80053 COMPREHEN METABOLIC PANEL: CPT

## 2019-01-17 PROCEDURE — 6370000000 HC RX 637 (ALT 250 FOR IP): Performed by: TRANSPLANT SURGERY

## 2019-01-17 PROCEDURE — 2700000000 HC OXYGEN THERAPY PER DAY

## 2019-01-17 PROCEDURE — 82705 FATS/LIPIDS FECES QUAL: CPT

## 2019-01-17 PROCEDURE — 99232 SBSQ HOSP IP/OBS MODERATE 35: CPT | Performed by: TRANSPLANT SURGERY

## 2019-01-17 PROCEDURE — 6360000002 HC RX W HCPCS: Performed by: INTERNAL MEDICINE

## 2019-01-17 PROCEDURE — 82248 BILIRUBIN DIRECT: CPT

## 2019-01-17 PROCEDURE — 2500000003 HC RX 250 WO HCPCS: Performed by: TRANSPLANT SURGERY

## 2019-01-17 PROCEDURE — 2060000000 HC ICU INTERMEDIATE R&B

## 2019-01-17 PROCEDURE — 6370000000 HC RX 637 (ALT 250 FOR IP): Performed by: INTERNAL MEDICINE

## 2019-01-17 PROCEDURE — 87425 ROTAVIRUS AG IA: CPT

## 2019-01-17 PROCEDURE — 82962 GLUCOSE BLOOD TEST: CPT

## 2019-01-17 PROCEDURE — 87329 GIARDIA AG IA: CPT

## 2019-01-17 PROCEDURE — 85025 COMPLETE CBC W/AUTO DIFF WBC: CPT

## 2019-01-17 PROCEDURE — 36415 COLL VENOUS BLD VENIPUNCTURE: CPT

## 2019-01-17 PROCEDURE — 89055 LEUKOCYTE ASSESSMENT FECAL: CPT

## 2019-01-17 PROCEDURE — 83690 ASSAY OF LIPASE: CPT

## 2019-01-17 PROCEDURE — 83036 HEMOGLOBIN GLYCOSYLATED A1C: CPT

## 2019-01-17 PROCEDURE — 82150 ASSAY OF AMYLASE: CPT

## 2019-01-17 PROCEDURE — 2580000003 HC RX 258: Performed by: TRANSPLANT SURGERY

## 2019-01-17 PROCEDURE — 6360000002 HC RX W HCPCS: Performed by: TRANSPLANT SURGERY

## 2019-01-17 PROCEDURE — 87045 FECES CULTURE AEROBIC BACT: CPT

## 2019-01-17 PROCEDURE — 83735 ASSAY OF MAGNESIUM: CPT

## 2019-01-17 PROCEDURE — 87324 CLOSTRIDIUM AG IA: CPT

## 2019-01-17 PROCEDURE — 83520 IMMUNOASSAY QUANT NOS NONAB: CPT

## 2019-01-17 RX ORDER — INSULIN GLARGINE 100 [IU]/ML
46 INJECTION, SOLUTION SUBCUTANEOUS NIGHTLY
Status: DISCONTINUED | OUTPATIENT
Start: 2019-01-17 | End: 2019-01-18 | Stop reason: HOSPADM

## 2019-01-17 RX ORDER — SODIUM CHLORIDE, SODIUM LACTATE, POTASSIUM CHLORIDE, AND CALCIUM CHLORIDE .6; .31; .03; .02 G/100ML; G/100ML; G/100ML; G/100ML
1000 INJECTION, SOLUTION INTRAVENOUS ONCE
Status: DISCONTINUED | OUTPATIENT
Start: 2019-01-18 | End: 2019-01-18 | Stop reason: HOSPADM

## 2019-01-17 RX ORDER — SODIUM CHLORIDE AND POTASSIUM CHLORIDE .9; .15 G/100ML; G/100ML
SOLUTION INTRAVENOUS CONTINUOUS
Status: DISCONTINUED | OUTPATIENT
Start: 2019-01-17 | End: 2019-01-17

## 2019-01-17 RX ORDER — CALCIUM CARBONATE 200(500)MG
500 TABLET,CHEWABLE ORAL 3 TIMES DAILY PRN
Status: DISCONTINUED | OUTPATIENT
Start: 2019-01-17 | End: 2019-01-18 | Stop reason: HOSPADM

## 2019-01-17 RX ORDER — ACYCLOVIR 800 MG/1
800 TABLET ORAL
Status: DISCONTINUED | OUTPATIENT
Start: 2019-01-17 | End: 2019-01-18 | Stop reason: HOSPADM

## 2019-01-17 RX ORDER — POTASSIUM CHLORIDE 20 MEQ/1
40 TABLET, EXTENDED RELEASE ORAL ONCE
Status: COMPLETED | OUTPATIENT
Start: 2019-01-17 | End: 2019-01-17

## 2019-01-17 RX ADMIN — Medication 10 ML: at 20:34

## 2019-01-17 RX ADMIN — METRONIDAZOLE 500 MG: 500 INJECTION, SOLUTION INTRAVENOUS at 02:03

## 2019-01-17 RX ADMIN — ACYCLOVIR 800 MG: 800 TABLET ORAL at 22:48

## 2019-01-17 RX ADMIN — ENOXAPARIN SODIUM 40 MG: 40 INJECTION SUBCUTANEOUS at 08:07

## 2019-01-17 RX ADMIN — Medication 10 ML: at 08:08

## 2019-01-17 RX ADMIN — METRONIDAZOLE 500 MG: 500 INJECTION, SOLUTION INTRAVENOUS at 09:51

## 2019-01-17 RX ADMIN — TRIAMTERENE AND HYDROCHLOROTHIAZIDE 2 TABLET: 37.5; 25 TABLET ORAL at 08:07

## 2019-01-17 RX ADMIN — INSULIN LISPRO 8 UNITS: 100 INJECTION, SOLUTION INTRAVENOUS; SUBCUTANEOUS at 18:07

## 2019-01-17 RX ADMIN — INSULIN GLARGINE 46 UNITS: 100 INJECTION, SOLUTION SUBCUTANEOUS at 20:35

## 2019-01-17 RX ADMIN — Medication 10 ML: at 09:52

## 2019-01-17 RX ADMIN — TIZANIDINE 4 MG: 4 TABLET ORAL at 20:34

## 2019-01-17 RX ADMIN — ACYCLOVIR 800 MG: 800 TABLET ORAL at 14:55

## 2019-01-17 RX ADMIN — ACYCLOVIR 800 MG: 800 TABLET ORAL at 12:54

## 2019-01-17 RX ADMIN — METRONIDAZOLE 500 MG: 500 INJECTION, SOLUTION INTRAVENOUS at 18:06

## 2019-01-17 RX ADMIN — METOPROLOL SUCCINATE 50 MG: 50 TABLET, EXTENDED RELEASE ORAL at 20:33

## 2019-01-17 RX ADMIN — ACYCLOVIR 800 MG: 800 TABLET ORAL at 19:02

## 2019-01-17 RX ADMIN — POTASSIUM CHLORIDE AND SODIUM CHLORIDE: 900; 150 INJECTION, SOLUTION INTRAVENOUS at 08:07

## 2019-01-17 RX ADMIN — INSULIN LISPRO 8 UNITS: 100 INJECTION, SOLUTION INTRAVENOUS; SUBCUTANEOUS at 11:13

## 2019-01-17 RX ADMIN — CEFTRIAXONE SODIUM 2 G: 2 INJECTION, POWDER, FOR SOLUTION INTRAMUSCULAR; INTRAVENOUS at 18:06

## 2019-01-17 RX ADMIN — INSULIN LISPRO 1 UNITS: 100 INJECTION, SOLUTION INTRAVENOUS; SUBCUTANEOUS at 20:35

## 2019-01-17 RX ADMIN — INSULIN LISPRO 4 UNITS: 100 INJECTION, SOLUTION INTRAVENOUS; SUBCUTANEOUS at 08:09

## 2019-01-17 RX ADMIN — POTASSIUM CHLORIDE 40 MEQ: 20 TABLET, EXTENDED RELEASE ORAL at 08:07

## 2019-01-17 RX ADMIN — FLUOXETINE 20 MG: 20 CAPSULE ORAL at 08:07

## 2019-01-17 ASSESSMENT — PAIN SCALES - GENERAL
PAINLEVEL_OUTOF10: 0
PAINLEVEL_OUTOF10: 0

## 2019-01-18 VITALS
HEIGHT: 63 IN | BODY MASS INDEX: 40.79 KG/M2 | SYSTOLIC BLOOD PRESSURE: 120 MMHG | TEMPERATURE: 98 F | OXYGEN SATURATION: 99 % | WEIGHT: 230.2 LBS | DIASTOLIC BLOOD PRESSURE: 80 MMHG | RESPIRATION RATE: 18 BRPM | HEART RATE: 73 BPM

## 2019-01-18 LAB
ALBUMIN SERPL-MCNC: 3.3 G/DL (ref 3.5–5.2)
ALP BLD-CCNC: 178 U/L (ref 35–104)
ALT SERPL-CCNC: 111 U/L (ref 0–32)
AMYLASE: 18 U/L (ref 20–100)
ANION GAP SERPL CALCULATED.3IONS-SCNC: 12 MMOL/L (ref 7–16)
APTT: 31.7 SEC (ref 24.5–35.1)
AST SERPL-CCNC: 55 U/L (ref 0–31)
BASOPHILS ABSOLUTE: 0.04 E9/L (ref 0–0.2)
BASOPHILS RELATIVE PERCENT: 0.8 % (ref 0–2)
BILIRUB SERPL-MCNC: 0.7 MG/DL (ref 0–1.2)
BUN BLDV-MCNC: 9 MG/DL (ref 6–20)
C DIFFICILE TOXIN, EIA: NORMAL
CALCIUM SERPL-MCNC: 9 MG/DL (ref 8.6–10.2)
CHLORIDE BLD-SCNC: 97 MMOL/L (ref 98–107)
CO2: 27 MMOL/L (ref 22–29)
CREAT SERPL-MCNC: 0.7 MG/DL (ref 0.5–1)
EOSINOPHILS ABSOLUTE: 0.15 E9/L (ref 0.05–0.5)
EOSINOPHILS RELATIVE PERCENT: 3.1 % (ref 0–6)
GFR AFRICAN AMERICAN: >60
GFR NON-AFRICAN AMERICAN: >60 ML/MIN/1.73
GIARDIA ANTIGEN STOOL: NORMAL
GLUCOSE BLD-MCNC: 211 MG/DL (ref 74–99)
HCT VFR BLD CALC: 35.4 % (ref 34–48)
HEMOGLOBIN: 11.8 G/DL (ref 11.5–15.5)
IMMATURE GRANULOCYTES #: 0.04 E9/L
IMMATURE GRANULOCYTES %: 0.8 % (ref 0–5)
INR BLD: 1
LIPASE: 34 U/L (ref 13–60)
LYMPHOCYTES ABSOLUTE: 1.08 E9/L (ref 1.5–4)
LYMPHOCYTES RELATIVE PERCENT: 22.5 % (ref 20–42)
MAGNESIUM: 1.5 MG/DL (ref 1.6–2.6)
MCH RBC QN AUTO: 29.1 PG (ref 26–35)
MCHC RBC AUTO-ENTMCNC: 33.3 % (ref 32–34.5)
MCV RBC AUTO: 87.4 FL (ref 80–99.9)
METER GLUCOSE: 157 MG/DL (ref 74–99)
METER GLUCOSE: 253 MG/DL (ref 74–99)
MONOCYTES ABSOLUTE: 0.38 E9/L (ref 0.1–0.95)
MONOCYTES RELATIVE PERCENT: 7.9 % (ref 2–12)
NEUTROPHILS ABSOLUTE: 3.11 E9/L (ref 1.8–7.3)
NEUTROPHILS RELATIVE PERCENT: 64.9 % (ref 43–80)
PDW BLD-RTO: 12.7 FL (ref 11.5–15)
PLATELET # BLD: 243 E9/L (ref 130–450)
PMV BLD AUTO: 10 FL (ref 7–12)
POTASSIUM REFLEX MAGNESIUM: 3.5 MMOL/L (ref 3.5–5)
PROTHROMBIN TIME: 11.7 SEC (ref 9.3–12.4)
RBC # BLD: 4.05 E12/L (ref 3.5–5.5)
ROTAVIRUS ANTIGEN: NORMAL
SODIUM BLD-SCNC: 136 MMOL/L (ref 132–146)
TOTAL PROTEIN: 6.8 G/DL (ref 6.4–8.3)
WBC # BLD: 4.8 E9/L (ref 4.5–11.5)

## 2019-01-18 PROCEDURE — 6360000002 HC RX W HCPCS: Performed by: INTERNAL MEDICINE

## 2019-01-18 PROCEDURE — 6360000002 HC RX W HCPCS: Performed by: TRANSPLANT SURGERY

## 2019-01-18 PROCEDURE — 85025 COMPLETE CBC W/AUTO DIFF WBC: CPT

## 2019-01-18 PROCEDURE — 2580000003 HC RX 258: Performed by: TRANSPLANT SURGERY

## 2019-01-18 PROCEDURE — 82962 GLUCOSE BLOOD TEST: CPT

## 2019-01-18 PROCEDURE — 83690 ASSAY OF LIPASE: CPT

## 2019-01-18 PROCEDURE — 80053 COMPREHEN METABOLIC PANEL: CPT

## 2019-01-18 PROCEDURE — 85610 PROTHROMBIN TIME: CPT

## 2019-01-18 PROCEDURE — 6370000000 HC RX 637 (ALT 250 FOR IP): Performed by: TRANSPLANT SURGERY

## 2019-01-18 PROCEDURE — 2500000003 HC RX 250 WO HCPCS: Performed by: TRANSPLANT SURGERY

## 2019-01-18 PROCEDURE — 83735 ASSAY OF MAGNESIUM: CPT

## 2019-01-18 PROCEDURE — C9113 INJ PANTOPRAZOLE SODIUM, VIA: HCPCS | Performed by: TRANSPLANT SURGERY

## 2019-01-18 PROCEDURE — 99232 SBSQ HOSP IP/OBS MODERATE 35: CPT | Performed by: TRANSPLANT SURGERY

## 2019-01-18 PROCEDURE — 85730 THROMBOPLASTIN TIME PARTIAL: CPT

## 2019-01-18 PROCEDURE — 36415 COLL VENOUS BLD VENIPUNCTURE: CPT

## 2019-01-18 PROCEDURE — 82150 ASSAY OF AMYLASE: CPT

## 2019-01-18 PROCEDURE — 6370000000 HC RX 637 (ALT 250 FOR IP): Performed by: INTERNAL MEDICINE

## 2019-01-18 RX ORDER — MAGNESIUM SULFATE IN WATER 40 MG/ML
2 INJECTION, SOLUTION INTRAVENOUS ONCE
Status: COMPLETED | OUTPATIENT
Start: 2019-01-18 | End: 2019-01-18

## 2019-01-18 RX ORDER — ACYCLOVIR 800 MG/1
800 TABLET ORAL
Qty: 30 TABLET | Refills: 0 | Status: SHIPPED | OUTPATIENT
Start: 2019-01-18 | End: 2019-01-24

## 2019-01-18 RX ADMIN — TRIAMTERENE AND HYDROCHLOROTHIAZIDE 2 TABLET: 37.5; 25 TABLET ORAL at 08:47

## 2019-01-18 RX ADMIN — PANTOPRAZOLE SODIUM 40 MG: 40 INJECTION, POWDER, FOR SOLUTION INTRAVENOUS at 08:47

## 2019-01-18 RX ADMIN — METRONIDAZOLE 500 MG: 500 INJECTION, SOLUTION INTRAVENOUS at 02:14

## 2019-01-18 RX ADMIN — FLUOXETINE 20 MG: 20 CAPSULE ORAL at 08:47

## 2019-01-18 RX ADMIN — ACYCLOVIR 800 MG: 800 TABLET ORAL at 05:55

## 2019-01-18 RX ADMIN — INSULIN LISPRO 2 UNITS: 100 INJECTION, SOLUTION INTRAVENOUS; SUBCUTANEOUS at 09:26

## 2019-01-18 RX ADMIN — MAGNESIUM SULFATE HEPTAHYDRATE 2 G: 40 INJECTION, SOLUTION INTRAVENOUS at 05:55

## 2019-01-18 RX ADMIN — Medication 10 ML: at 08:47

## 2019-01-18 RX ADMIN — INSULIN LISPRO 2 UNITS: 100 INJECTION, SOLUTION INTRAVENOUS; SUBCUTANEOUS at 12:02

## 2019-01-18 RX ADMIN — ENOXAPARIN SODIUM 40 MG: 40 INJECTION SUBCUTANEOUS at 08:49

## 2019-01-18 RX ADMIN — Medication 10 ML: at 08:49

## 2019-01-18 RX ADMIN — METOPROLOL SUCCINATE 50 MG: 50 TABLET, EXTENDED RELEASE ORAL at 08:47

## 2019-01-18 RX ADMIN — ACYCLOVIR 800 MG: 800 TABLET ORAL at 12:01

## 2019-01-18 ASSESSMENT — PAIN SCALES - GENERAL: PAINLEVEL_OUTOF10: 0

## 2019-01-19 LAB — WHITE BLOOD CELLS (WBC), STOOL: NORMAL

## 2019-01-20 LAB
CULTURE, STOOL: NORMAL
FECAL NEUTRAL FAT: NORMAL
FECAL SPLIT FATS: NORMAL

## 2019-01-22 LAB
Lab: NORMAL
REPORT: NORMAL
THIS TEST SENT TO: NORMAL

## 2019-02-06 ENCOUNTER — TELEPHONE (OUTPATIENT)
Dept: HEMATOLOGY | Age: 49
End: 2019-02-06

## 2019-02-22 ENCOUNTER — OFFICE VISIT (OUTPATIENT)
Dept: HEMATOLOGY | Age: 49
End: 2019-02-22
Payer: COMMERCIAL

## 2019-02-22 VITALS
HEART RATE: 76 BPM | WEIGHT: 234.8 LBS | SYSTOLIC BLOOD PRESSURE: 121 MMHG | OXYGEN SATURATION: 98 % | HEIGHT: 63 IN | DIASTOLIC BLOOD PRESSURE: 83 MMHG | BODY MASS INDEX: 41.6 KG/M2

## 2019-02-22 DIAGNOSIS — K83.1 BILIARY STRICTURE: Primary | ICD-10-CM

## 2019-02-22 DIAGNOSIS — K80.50 CHOLEDOCHOLITHIASIS: ICD-10-CM

## 2019-02-22 PROCEDURE — 99213 OFFICE O/P EST LOW 20 MIN: CPT | Performed by: TRANSPLANT SURGERY

## 2019-02-22 ASSESSMENT — ENCOUNTER SYMPTOMS
DIARRHEA: 0
SHORTNESS OF BREATH: 0
ABDOMINAL PAIN: 0
PHOTOPHOBIA: 0
EYE PAIN: 0
EYE DISCHARGE: 0
BACK PAIN: 0
CONSTIPATION: 0
VOMITING: 0
NAUSEA: 0
BLOOD IN STOOL: 0

## 2019-06-12 ENCOUNTER — HOSPITAL ENCOUNTER (EMERGENCY)
Age: 49
Discharge: HOME | End: 2019-06-12
Payer: COMMERCIAL

## 2019-06-12 VITALS
RESPIRATION RATE: 20 BRPM | BODY MASS INDEX: 41.27 KG/M2 | HEART RATE: 105 BPM | WEIGHT: 233 LBS | TEMPERATURE: 99.5 F | SYSTOLIC BLOOD PRESSURE: 114 MMHG | DIASTOLIC BLOOD PRESSURE: 79 MMHG | OXYGEN SATURATION: 97 %

## 2019-06-12 DIAGNOSIS — J01.90 ACUTE SINUSITIS, RECURRENCE NOT SPECIFIED, UNSPECIFIED LOCATION: Primary | ICD-10-CM

## 2019-06-12 DIAGNOSIS — J20.9 ACUTE BRONCHITIS, UNSPECIFIED ORGANISM: ICD-10-CM

## 2019-06-12 PROCEDURE — 99212 OFFICE O/P EST SF 10 MIN: CPT

## 2019-06-12 RX ORDER — DOXYCYCLINE HYCLATE 100 MG
100 TABLET ORAL 2 TIMES DAILY
Qty: 20 TABLET | Refills: 0 | Status: SHIPPED | OUTPATIENT
Start: 2019-06-12 | End: 2019-06-22

## 2019-06-12 RX ORDER — METHYLPREDNISOLONE 4 MG/1
TABLET ORAL
Qty: 21 TABLET | Status: SHIPPED | OUTPATIENT
Start: 2019-06-12 | End: 2019-06-18

## 2019-06-12 RX ORDER — ALBUTEROL SULFATE 90 UG/1
2 AEROSOL, METERED RESPIRATORY (INHALATION) EVERY 6 HOURS PRN
Qty: 1 INHALER | Refills: 0 | Status: SHIPPED | OUTPATIENT
Start: 2019-06-12 | End: 2020-07-14

## 2019-06-12 ASSESSMENT — PAIN DESCRIPTION - LOCATION: LOCATION: HEAD

## 2019-06-12 ASSESSMENT — PAIN DESCRIPTION - DESCRIPTORS: DESCRIPTORS: HEADACHE;PRESSURE

## 2019-06-12 ASSESSMENT — PAIN SCALES - GENERAL: PAINLEVEL_OUTOF10: 9

## 2019-06-12 NOTE — ED PROVIDER NOTES
HPI: Christa Lorenz 52 y.o. femalewith a past medical history of   Past Medical History:   Diagnosis Date    Back pain     Depression with anxiety     Diabetes mellitus ([address]     Fatty liver     Gallstones     GERD (gastroesophageal reflux disease)     Hyperlipidemia     Hypertension     Metabolic disorder     MTHFR (methylene THF reductase) deficiency and homocystinuria (HCC)     MTHFR mutation ([facility] [address]     Nasal septal deviation     procedure 10/27/2014    Preoperative clearance 10/20/2014    medical-Dr. Milderd Baumgarten; paper copy on chart for procedure 10/27/2014    Sleep apnea     cpap    Unspecified diseases of blood and blood-forming organs     presents with cough.  Onset of symptoms reported as gradual, nonstridulous.  She had this about 3 weeks ago was put on a Z-Justo and steroids and Tessalon and got better but she has it back again she has a lot of pressure and pain in her sinuses her ears feel full her sinuses are congested and her chest is congested. She is not running a fever she is not complaining of shortness of breath. States she does not have a history of asthma. States she is has a harsh productive cough. Review of Systems:   Pertinent positives and negatives are stated within HPI, all other systems reviewed and are negative.          --------------------------------------------- PAST HISTORY ---------------------------------------------  Past Medical History:  has a past medical history of Back pain, Depression with anxiety, Diabetes mellitus ([address] Fatty liver, Gallstones, GERD (gastroesophageal reflux disease), Hyperlipidemia, Hypertension, Metabolic disorder, MTHFR (methylene THF reductase) deficiency and homocystinuria ([address] MTHFR mutation ([facility] [address] Nasal septal deviation, Preoperative clearance, Sleep apnea, and Unspecified diseases of blood and blood-forming organs. Past Surgical History:  has a past surgical history that includes Hysterectomy;  Cholecystectomy; Ectopic pregnancy surgery; ERCP; Sinus endoscopy (Bilateral, 10/27/2014); and Abdomen surgery. Social History:  reports that she has never smoked. She has never used smokeless tobacco. She reports that she does not drink alcohol or use drugs. Family History: family history includes Diabetes in her father and mother; High Blood Pressure in her brother; Other in her brother. The patients home medications have been reviewed. Allergies: Latex; Morphine; Other; Statins; Adhesive tape; and Sulfa antibiotics       ---------------------------------------------------PHYSICAL EXAM--------------------------------------    Constitutional/General: Alert and oriented x3, well appearing, non toxic in NAD  Head: Normocephalic and atraumatic, tender over the maxillary sinuses  Eyes: conjunctiva clear  Mouth: Oropharynx clear, handling secretions, no trismus  Neck: Supple, full ROM, non tender to palpation in the midline, no stridor, no crepitus, no meningeal signs  Pulmonary: Lungs scattered rhonchi on the left and some scattered wheezing present. Harsh cough. \  Cardiovascular:  Regular rate. Regular rhythm. No murmurs, gallops, or rubs. 2+ distal pulses  Chest: no chest wall tenderness  Abdomen: Soft. Non tender. Non distended. Musculoskeletal: Moves all extremities x 4. Warm and well perfused, no clubbing, cyanosis, or edema. Capillary refill <3 seconds  Skin: warm and dry. No rashes. Neurologic: GCS 15  Psych: Normal Affect      -------------------------------------------------- RESULTS -------------------------------------------------    LABS:  No results found for this visit on 06/12/19. RADIOLOGY:  Interpreted by Radiologist.  No orders to display         ------------------------- NURSING NOTES AND VITALS REVIEWED ---------------------------   The nursing notes within the ED encounter and vital signs as below have been reviewed.    /79   Pulse 105   Temp 99.5 °F (37.5 °C) (Oral)   Resp 20   Wt 233 lb (105.7 kg)

## 2020-01-02 ENCOUNTER — APPOINTMENT (OUTPATIENT)
Dept: CT IMAGING | Age: 50
DRG: 446 | End: 2020-01-02
Payer: COMMERCIAL

## 2020-01-02 ENCOUNTER — APPOINTMENT (OUTPATIENT)
Dept: ULTRASOUND IMAGING | Age: 50
DRG: 446 | End: 2020-01-02
Payer: COMMERCIAL

## 2020-01-02 ENCOUNTER — HOSPITAL ENCOUNTER (INPATIENT)
Age: 50
LOS: 3 days | Discharge: HOME | DRG: 446 | End: 2020-01-06
Attending: EMERGENCY MEDICINE | Admitting: INTERNAL MEDICINE
Payer: COMMERCIAL

## 2020-01-02 PROBLEM — E11.9 DIABETES MELLITUS (HCC): Status: ACTIVE | Noted: 2020-01-02

## 2020-01-02 LAB
ACETAMINOPHEN LEVEL: <5 MCG/ML (ref 10–30)
ALBUMIN SERPL-MCNC: 4.3 G/DL (ref 3.5–5.2)
ALP BLD-CCNC: 265 U/L (ref 35–104)
ALT SERPL-CCNC: 83 U/L (ref 0–32)
ANION GAP SERPL CALCULATED.3IONS-SCNC: 21 MMOL/L (ref 7–16)
AST SERPL-CCNC: 293 U/L (ref 0–31)
BACTERIA: NORMAL /HPF
BASOPHILS ABSOLUTE: 0.06 E9/L (ref 0–0.2)
BASOPHILS RELATIVE PERCENT: 0.5 % (ref 0–2)
BILIRUB SERPL-MCNC: 1.2 MG/DL (ref 0–1.2)
BILIRUBIN DIRECT: 2.1 MG/DL (ref 0–0.3)
BILIRUBIN URINE: NEGATIVE
BLOOD, URINE: NEGATIVE
BUN BLDV-MCNC: 11 MG/DL (ref 6–20)
CALCIUM SERPL-MCNC: 10.5 MG/DL (ref 8.6–10.2)
CASTS: NORMAL /LPF
CHLORIDE BLD-SCNC: 90 MMOL/L (ref 98–107)
CHOLESTEROL, TOTAL: 323 MG/DL (ref 0–199)
CHP ED QC CHECK: NORMAL
CLARITY: CLEAR
CO2: 25 MMOL/L (ref 22–29)
CO2: 27 MMOL/L (ref 22–29)
COLOR: YELLOW
CREAT SERPL-MCNC: 0.8 MG/DL (ref 0.5–1)
EOSINOPHILS ABSOLUTE: 0.07 E9/L (ref 0.05–0.5)
EOSINOPHILS RELATIVE PERCENT: 0.6 % (ref 0–6)
EPITHELIAL CELLS, UA: NORMAL /HPF
FERRITIN: 1181 NG/ML
GAMMA GLUTAMYL TRANSFERASE: 1228 U/L (ref 6–42)
GFR AFRICAN AMERICAN: >60
GFR AFRICAN AMERICAN: >60
GFR NON-AFRICAN AMERICAN: >60 ML/MIN/1.73
GFR NON-AFRICAN AMERICAN: >60 ML/MIN/1.73
GLUCOSE BLD-MCNC: 372 MG/DL
GLUCOSE BLD-MCNC: 437 MG/DL (ref 74–99)
GLUCOSE BLD-MCNC: 446 MG/DL (ref 74–99)
GLUCOSE URINE: >=1000 MG/DL
HBA1C MFR BLD: 11.1 % (ref 4–5.6)
HCT VFR BLD CALC: 44.5 % (ref 34–48)
HDLC SERPL-MCNC: 34 MG/DL
HEMOGLOBIN: 15.1 G/DL (ref 11.5–15.5)
IMMATURE GRANULOCYTES #: 0.05 E9/L
IMMATURE GRANULOCYTES %: 0.4 % (ref 0–5)
INR BLD: 1
IRON SATURATION: 15 % (ref 15–50)
IRON: 38 MCG/DL (ref 37–145)
KETONES, URINE: ABNORMAL MG/DL
LACTIC ACID: 5.4 MMOL/L (ref 0.5–2.2)
LDL CHOLESTEROL CALCULATED: ABNORMAL MG/DL (ref 0–99)
LEUKOCYTE ESTERASE, URINE: NEGATIVE
LIPASE: 58 U/L (ref 13–60)
LYMPHOCYTES ABSOLUTE: 2.09 E9/L (ref 1.5–4)
LYMPHOCYTES RELATIVE PERCENT: 16.8 % (ref 20–42)
MCH RBC QN AUTO: 28.2 PG (ref 26–35)
MCHC RBC AUTO-ENTMCNC: 33.9 % (ref 32–34.5)
MCV RBC AUTO: 83 FL (ref 80–99.9)
METER GLUCOSE: 311 MG/DL (ref 74–99)
METER GLUCOSE: 347 MG/DL (ref 74–99)
METER GLUCOSE: 372 MG/DL (ref 74–99)
METER GLUCOSE: 372 MG/DL (ref 74–99)
METER GLUCOSE: 427 MG/DL (ref 74–99)
MONOCYTES ABSOLUTE: 0.55 E9/L (ref 0.1–0.95)
MONOCYTES RELATIVE PERCENT: 4.4 % (ref 2–12)
NEUTROPHILS ABSOLUTE: 9.61 E9/L (ref 1.8–7.3)
NEUTROPHILS RELATIVE PERCENT: 77.3 % (ref 43–80)
NITRITE, URINE: NEGATIVE
PDW BLD-RTO: 12.1 FL (ref 11.5–15)
PH UA: 6 (ref 5–9)
PLATELET # BLD: 359 E9/L (ref 130–450)
PMV BLD AUTO: 10.4 FL (ref 7–12)
POC ANION GAP: 12 MMOL/L (ref 7–16)
POC BUN: 11 MG/DL (ref 8–23)
POC CHLORIDE: 97 MMOL/L (ref 100–108)
POC CREATININE: 0.8 MG/DL (ref 0.5–1)
POC POTASSIUM: 4.2 MMOL/L (ref 3.5–5)
POC SODIUM: 136 MMOL/L (ref 132–146)
POTASSIUM REFLEX MAGNESIUM: 4.3 MMOL/L (ref 3.5–5)
PROTEIN UA: ABNORMAL MG/DL
PROTHROMBIN TIME: 11.8 SEC (ref 9.3–12.4)
RBC # BLD: 5.36 E12/L (ref 3.5–5.5)
RBC UA: NORMAL /HPF (ref 0–2)
SODIUM BLD-SCNC: 136 MMOL/L (ref 132–146)
SPECIFIC GRAVITY UA: 1.01 (ref 1–1.03)
TOTAL CK: 218 U/L (ref 20–180)
TOTAL IRON BINDING CAPACITY: 248 MCG/DL (ref 250–450)
TOTAL PROTEIN: 8.1 G/DL (ref 6.4–8.3)
TRIGL SERPL-MCNC: 492 MG/DL (ref 0–149)
TROPONIN: <0.01 NG/ML (ref 0–0.03)
UROBILINOGEN, URINE: 0.2 E.U./DL
VLDLC SERPL CALC-MCNC: ABNORMAL MG/DL
WBC # BLD: 12.4 E9/L (ref 4.5–11.5)
WBC UA: NORMAL /HPF (ref 0–5)

## 2020-01-02 PROCEDURE — 83605 ASSAY OF LACTIC ACID: CPT

## 2020-01-02 PROCEDURE — 83540 ASSAY OF IRON: CPT

## 2020-01-02 PROCEDURE — 85025 COMPLETE CBC W/AUTO DIFF WBC: CPT

## 2020-01-02 PROCEDURE — 86255 FLUORESCENT ANTIBODY SCREEN: CPT

## 2020-01-02 PROCEDURE — 81001 URINALYSIS AUTO W/SCOPE: CPT

## 2020-01-02 PROCEDURE — C9113 INJ PANTOPRAZOLE SODIUM, VIA: HCPCS | Performed by: INTERNAL MEDICINE

## 2020-01-02 PROCEDURE — G0378 HOSPITAL OBSERVATION PER HR: HCPCS

## 2020-01-02 PROCEDURE — 80061 LIPID PANEL: CPT

## 2020-01-02 PROCEDURE — 96372 THER/PROPH/DIAG INJ SC/IM: CPT

## 2020-01-02 PROCEDURE — 82962 GLUCOSE BLOOD TEST: CPT

## 2020-01-02 PROCEDURE — 6360000002 HC RX W HCPCS: Performed by: EMERGENCY MEDICINE

## 2020-01-02 PROCEDURE — 96374 THER/PROPH/DIAG INJ IV PUSH: CPT

## 2020-01-02 PROCEDURE — 83690 ASSAY OF LIPASE: CPT

## 2020-01-02 PROCEDURE — 87088 URINE BACTERIA CULTURE: CPT

## 2020-01-02 PROCEDURE — 80074 ACUTE HEPATITIS PANEL: CPT

## 2020-01-02 PROCEDURE — 36415 COLL VENOUS BLD VENIPUNCTURE: CPT

## 2020-01-02 PROCEDURE — 82977 ASSAY OF GGT: CPT

## 2020-01-02 PROCEDURE — 6360000002 HC RX W HCPCS: Performed by: INTERNAL MEDICINE

## 2020-01-02 PROCEDURE — 82248 BILIRUBIN DIRECT: CPT

## 2020-01-02 PROCEDURE — 94760 N-INVAS EAR/PLS OXIMETRY 1: CPT

## 2020-01-02 PROCEDURE — 96376 TX/PRO/DX INJ SAME DRUG ADON: CPT

## 2020-01-02 PROCEDURE — 83550 IRON BINDING TEST: CPT

## 2020-01-02 PROCEDURE — 76705 ECHO EXAM OF ABDOMEN: CPT

## 2020-01-02 PROCEDURE — 86038 ANTINUCLEAR ANTIBODIES: CPT

## 2020-01-02 PROCEDURE — 85610 PROTHROMBIN TIME: CPT

## 2020-01-02 PROCEDURE — 82550 ASSAY OF CK (CPK): CPT

## 2020-01-02 PROCEDURE — 83036 HEMOGLOBIN GLYCOSYLATED A1C: CPT

## 2020-01-02 PROCEDURE — 74177 CT ABD & PELVIS W/CONTRAST: CPT

## 2020-01-02 PROCEDURE — 82103 ALPHA-1-ANTITRYPSIN TOTAL: CPT

## 2020-01-02 PROCEDURE — 84484 ASSAY OF TROPONIN QUANT: CPT

## 2020-01-02 PROCEDURE — 82947 ASSAY GLUCOSE BLOOD QUANT: CPT

## 2020-01-02 PROCEDURE — 80051 ELECTROLYTE PANEL: CPT

## 2020-01-02 PROCEDURE — 84520 ASSAY OF UREA NITROGEN: CPT

## 2020-01-02 PROCEDURE — 80053 COMPREHEN METABOLIC PANEL: CPT

## 2020-01-02 PROCEDURE — 82565 ASSAY OF CREATININE: CPT

## 2020-01-02 PROCEDURE — 2580000003 HC RX 258: Performed by: INTERNAL MEDICINE

## 2020-01-02 PROCEDURE — 6360000004 HC RX CONTRAST MEDICATION: Performed by: RADIOLOGY

## 2020-01-02 PROCEDURE — 80307 DRUG TEST PRSMV CHEM ANLYZR: CPT

## 2020-01-02 PROCEDURE — 2580000003 HC RX 258: Performed by: EMERGENCY MEDICINE

## 2020-01-02 PROCEDURE — 99285 EMERGENCY DEPT VISIT HI MDM: CPT

## 2020-01-02 PROCEDURE — 6370000000 HC RX 637 (ALT 250 FOR IP): Performed by: INTERNAL MEDICINE

## 2020-01-02 PROCEDURE — 96375 TX/PRO/DX INJ NEW DRUG ADDON: CPT

## 2020-01-02 PROCEDURE — 82728 ASSAY OF FERRITIN: CPT

## 2020-01-02 PROCEDURE — 82390 ASSAY OF CERULOPLASMIN: CPT

## 2020-01-02 PROCEDURE — 93005 ELECTROCARDIOGRAM TRACING: CPT | Performed by: EMERGENCY MEDICINE

## 2020-01-02 RX ORDER — FENTANYL CITRATE 50 UG/ML
50 INJECTION, SOLUTION INTRAMUSCULAR; INTRAVENOUS ONCE
Status: COMPLETED | OUTPATIENT
Start: 2020-01-02 | End: 2020-01-02

## 2020-01-02 RX ORDER — 0.9 % SODIUM CHLORIDE 0.9 %
1000 INTRAVENOUS SOLUTION INTRAVENOUS ONCE
Status: COMPLETED | OUTPATIENT
Start: 2020-01-02 | End: 2020-01-02

## 2020-01-02 RX ORDER — HYDROMORPHONE HYDROCHLORIDE 1 MG/ML
1 INJECTION, SOLUTION INTRAMUSCULAR; INTRAVENOUS; SUBCUTANEOUS EVERY 6 HOURS PRN
Status: DISCONTINUED | OUTPATIENT
Start: 2020-01-02 | End: 2020-01-03

## 2020-01-02 RX ORDER — METOPROLOL SUCCINATE 50 MG/1
50 TABLET, EXTENDED RELEASE ORAL 2 TIMES DAILY
Status: DISCONTINUED | OUTPATIENT
Start: 2020-01-02 | End: 2020-01-06 | Stop reason: HOSPADM

## 2020-01-02 RX ORDER — CHLORAL HYDRATE 500 MG
2000 CAPSULE ORAL 2 TIMES DAILY
Status: ON HOLD | COMMUNITY
End: 2020-01-06 | Stop reason: HOSPADM

## 2020-01-02 RX ORDER — PROMETHAZINE HYDROCHLORIDE 25 MG/ML
25 INJECTION, SOLUTION INTRAMUSCULAR; INTRAVENOUS EVERY 6 HOURS PRN
Status: DISCONTINUED | OUTPATIENT
Start: 2020-01-02 | End: 2020-01-06 | Stop reason: HOSPADM

## 2020-01-02 RX ORDER — DEXTROSE MONOHYDRATE 50 MG/ML
100 INJECTION, SOLUTION INTRAVENOUS PRN
Status: DISCONTINUED | OUTPATIENT
Start: 2020-01-02 | End: 2020-01-06 | Stop reason: HOSPADM

## 2020-01-02 RX ORDER — TRIAMTERENE AND HYDROCHLOROTHIAZIDE 37.5; 25 MG/1; MG/1
2 TABLET ORAL DAILY
Status: DISCONTINUED | OUTPATIENT
Start: 2020-01-02 | End: 2020-01-06 | Stop reason: HOSPADM

## 2020-01-02 RX ORDER — NICOTINE POLACRILEX 4 MG
15 LOZENGE BUCCAL PRN
Status: DISCONTINUED | OUTPATIENT
Start: 2020-01-02 | End: 2020-01-06 | Stop reason: HOSPADM

## 2020-01-02 RX ORDER — ONDANSETRON 2 MG/ML
8 INJECTION INTRAMUSCULAR; INTRAVENOUS EVERY 6 HOURS PRN
Status: DISCONTINUED | OUTPATIENT
Start: 2020-01-02 | End: 2020-01-06 | Stop reason: HOSPADM

## 2020-01-02 RX ORDER — FLUTICASONE PROPIONATE 50 MCG
2 SPRAY, SUSPENSION (ML) NASAL DAILY PRN
Status: DISCONTINUED | OUTPATIENT
Start: 2020-01-02 | End: 2020-01-06 | Stop reason: HOSPADM

## 2020-01-02 RX ORDER — SODIUM CHLORIDE 9 MG/ML
10 INJECTION INTRAVENOUS DAILY
Status: DISCONTINUED | OUTPATIENT
Start: 2020-01-02 | End: 2020-01-06 | Stop reason: HOSPADM

## 2020-01-02 RX ORDER — FLUOXETINE HYDROCHLORIDE 20 MG/1
20 CAPSULE ORAL DAILY
Status: DISCONTINUED | OUTPATIENT
Start: 2020-01-02 | End: 2020-01-06 | Stop reason: HOSPADM

## 2020-01-02 RX ORDER — INSULIN GLARGINE 100 [IU]/ML
30 INJECTION, SOLUTION SUBCUTANEOUS NIGHTLY
Status: DISCONTINUED | OUTPATIENT
Start: 2020-01-02 | End: 2020-01-02

## 2020-01-02 RX ORDER — SODIUM CHLORIDE 9 MG/ML
INJECTION, SOLUTION INTRAVENOUS CONTINUOUS
Status: DISCONTINUED | OUTPATIENT
Start: 2020-01-02 | End: 2020-01-05

## 2020-01-02 RX ORDER — CHLORAL HYDRATE 500 MG
2000 CAPSULE ORAL 2 TIMES DAILY
Status: DISCONTINUED | OUTPATIENT
Start: 2020-01-02 | End: 2020-01-02 | Stop reason: ALTCHOICE

## 2020-01-02 RX ORDER — KETOROLAC TROMETHAMINE 30 MG/ML
30 INJECTION, SOLUTION INTRAMUSCULAR; INTRAVENOUS ONCE
Status: COMPLETED | OUTPATIENT
Start: 2020-01-02 | End: 2020-01-02

## 2020-01-02 RX ORDER — PANTOPRAZOLE SODIUM 40 MG/10ML
40 INJECTION, POWDER, LYOPHILIZED, FOR SOLUTION INTRAVENOUS DAILY
Status: DISCONTINUED | OUTPATIENT
Start: 2020-01-02 | End: 2020-01-06 | Stop reason: HOSPADM

## 2020-01-02 RX ORDER — ONDANSETRON 2 MG/ML
4 INJECTION INTRAMUSCULAR; INTRAVENOUS ONCE
Status: COMPLETED | OUTPATIENT
Start: 2020-01-02 | End: 2020-01-02

## 2020-01-02 RX ORDER — PROMETHAZINE HYDROCHLORIDE 25 MG/ML
25 INJECTION, SOLUTION INTRAMUSCULAR; INTRAVENOUS ONCE
Status: COMPLETED | OUTPATIENT
Start: 2020-01-02 | End: 2020-01-02

## 2020-01-02 RX ORDER — TIZANIDINE 4 MG/1
4 TABLET ORAL NIGHTLY
Status: DISCONTINUED | OUTPATIENT
Start: 2020-01-02 | End: 2020-01-06 | Stop reason: HOSPADM

## 2020-01-02 RX ORDER — INSULIN GLARGINE 100 [IU]/ML
50 INJECTION, SOLUTION SUBCUTANEOUS NIGHTLY
Status: DISCONTINUED | OUTPATIENT
Start: 2020-01-02 | End: 2020-01-05

## 2020-01-02 RX ORDER — DEXTROSE MONOHYDRATE 25 G/50ML
12.5 INJECTION, SOLUTION INTRAVENOUS PRN
Status: DISCONTINUED | OUTPATIENT
Start: 2020-01-02 | End: 2020-01-06 | Stop reason: HOSPADM

## 2020-01-02 RX ADMIN — FLUOXETINE 20 MG: 20 CAPSULE ORAL at 08:55

## 2020-01-02 RX ADMIN — INSULIN GLARGINE 30 UNITS: 100 INJECTION, SOLUTION SUBCUTANEOUS at 20:25

## 2020-01-02 RX ADMIN — PROMETHAZINE HYDROCHLORIDE 25 MG: 25 INJECTION INTRAMUSCULAR; INTRAVENOUS at 03:52

## 2020-01-02 RX ADMIN — FENTANYL CITRATE 50 MCG: 50 INJECTION, SOLUTION INTRAMUSCULAR; INTRAVENOUS at 04:49

## 2020-01-02 RX ADMIN — KETOROLAC TROMETHAMINE 30 MG: 30 INJECTION, SOLUTION INTRAMUSCULAR; INTRAVENOUS at 01:52

## 2020-01-02 RX ADMIN — METOPROLOL SUCCINATE 50 MG: 50 TABLET, EXTENDED RELEASE ORAL at 20:23

## 2020-01-02 RX ADMIN — INSULIN GLARGINE 20 UNITS: 100 INJECTION, SOLUTION SUBCUTANEOUS at 20:57

## 2020-01-02 RX ADMIN — HYDROMORPHONE HYDROCHLORIDE 1 MG: 1 INJECTION, SOLUTION INTRAMUSCULAR; INTRAVENOUS; SUBCUTANEOUS at 14:39

## 2020-01-02 RX ADMIN — FENTANYL CITRATE 50 MCG: 50 INJECTION, SOLUTION INTRAMUSCULAR; INTRAVENOUS at 03:25

## 2020-01-02 RX ADMIN — ONDANSETRON 8 MG: 2 INJECTION INTRAMUSCULAR; INTRAVENOUS at 08:54

## 2020-01-02 RX ADMIN — ONDANSETRON 8 MG: 2 INJECTION INTRAMUSCULAR; INTRAVENOUS at 14:38

## 2020-01-02 RX ADMIN — ONDANSETRON 4 MG: 2 INJECTION INTRAMUSCULAR; INTRAVENOUS at 01:52

## 2020-01-02 RX ADMIN — ENOXAPARIN SODIUM 40 MG: 40 INJECTION SUBCUTANEOUS at 20:57

## 2020-01-02 RX ADMIN — SODIUM CHLORIDE 1000 ML: 9 INJECTION, SOLUTION INTRAVENOUS at 04:48

## 2020-01-02 RX ADMIN — SODIUM CHLORIDE 1000 ML: 9 INJECTION, SOLUTION INTRAVENOUS at 03:25

## 2020-01-02 RX ADMIN — HYDROMORPHONE HYDROCHLORIDE 1 MG: 1 INJECTION, SOLUTION INTRAMUSCULAR; INTRAVENOUS; SUBCUTANEOUS at 21:04

## 2020-01-02 RX ADMIN — INSULIN LISPRO 4 UNITS: 100 INJECTION, SOLUTION INTRAVENOUS; SUBCUTANEOUS at 20:24

## 2020-01-02 RX ADMIN — INSULIN LISPRO 12 UNITS: 100 INJECTION, SOLUTION INTRAVENOUS; SUBCUTANEOUS at 12:57

## 2020-01-02 RX ADMIN — TRIAMTERENE AND HYDROCHLOROTHIAZIDE 2 TABLET: 37.5; 25 TABLET ORAL at 09:17

## 2020-01-02 RX ADMIN — PANTOPRAZOLE SODIUM 40 MG: 40 INJECTION, POWDER, FOR SOLUTION INTRAVENOUS at 08:55

## 2020-01-02 RX ADMIN — SODIUM CHLORIDE 1000 ML: 9 INJECTION, SOLUTION INTRAVENOUS at 01:52

## 2020-01-02 RX ADMIN — INSULIN LISPRO 10 UNITS: 100 INJECTION, SOLUTION INTRAVENOUS; SUBCUTANEOUS at 16:25

## 2020-01-02 RX ADMIN — INSULIN LISPRO 8 UNITS: 100 INJECTION, SOLUTION INTRAVENOUS; SUBCUTANEOUS at 09:07

## 2020-01-02 RX ADMIN — SODIUM CHLORIDE: 9 INJECTION, SOLUTION INTRAVENOUS at 07:15

## 2020-01-02 RX ADMIN — METOPROLOL SUCCINATE 50 MG: 50 TABLET, EXTENDED RELEASE ORAL at 08:55

## 2020-01-02 RX ADMIN — TIZANIDINE 4 MG: 4 TABLET ORAL at 20:23

## 2020-01-02 RX ADMIN — SODIUM CHLORIDE: 9 INJECTION, SOLUTION INTRAVENOUS at 18:52

## 2020-01-02 RX ADMIN — ONDANSETRON 8 MG: 2 INJECTION INTRAMUSCULAR; INTRAVENOUS at 21:05

## 2020-01-02 RX ADMIN — HYDROMORPHONE HYDROCHLORIDE 1 MG: 1 INJECTION, SOLUTION INTRAMUSCULAR; INTRAVENOUS; SUBCUTANEOUS at 08:55

## 2020-01-02 RX ADMIN — IOPAMIDOL 80 ML: 755 INJECTION, SOLUTION INTRAVENOUS at 02:40

## 2020-01-02 ASSESSMENT — ENCOUNTER SYMPTOMS
ABDOMINAL PAIN: 1
NAUSEA: 1
CONSTIPATION: 0
DIARRHEA: 0
SHORTNESS OF BREATH: 0
EYE REDNESS: 0
VOMITING: 1

## 2020-01-02 ASSESSMENT — PAIN DESCRIPTION - PROGRESSION
CLINICAL_PROGRESSION: GRADUALLY WORSENING
CLINICAL_PROGRESSION: GRADUALLY WORSENING

## 2020-01-02 ASSESSMENT — PAIN DESCRIPTION - PAIN TYPE
TYPE: ACUTE PAIN

## 2020-01-02 ASSESSMENT — PAIN SCALES - GENERAL
PAINLEVEL_OUTOF10: 8
PAINLEVEL_OUTOF10: 0
PAINLEVEL_OUTOF10: 0
PAINLEVEL_OUTOF10: 7
PAINLEVEL_OUTOF10: 9
PAINLEVEL_OUTOF10: 2
PAINLEVEL_OUTOF10: 7
PAINLEVEL_OUTOF10: 9
PAINLEVEL_OUTOF10: 3
PAINLEVEL_OUTOF10: 10
PAINLEVEL_OUTOF10: 10

## 2020-01-02 ASSESSMENT — PAIN - FUNCTIONAL ASSESSMENT
PAIN_FUNCTIONAL_ASSESSMENT: ACTIVITIES ARE NOT PREVENTED
PAIN_FUNCTIONAL_ASSESSMENT: INTOLERABLE, UNABLE TO DO ANY ACTIVE OR PASSIVE ACTIVITIES

## 2020-01-02 ASSESSMENT — PAIN DESCRIPTION - ORIENTATION
ORIENTATION: RIGHT

## 2020-01-02 ASSESSMENT — PAIN DESCRIPTION - ONSET
ONSET: GRADUAL
ONSET: GRADUAL

## 2020-01-02 ASSESSMENT — PAIN DESCRIPTION - LOCATION
LOCATION: ABDOMEN;FLANK
LOCATION: FLANK
LOCATION: ABDOMEN

## 2020-01-02 ASSESSMENT — PAIN DESCRIPTION - DESCRIPTORS: DESCRIPTORS: ACHING;CRUSHING

## 2020-01-02 ASSESSMENT — PAIN DESCRIPTION - FREQUENCY: FREQUENCY: INTERMITTENT

## 2020-01-02 NOTE — ED PROVIDER NOTES
Chief complaint: Flank pain      HPI:  1/2/20, Time: 1:10 AM         Carlyn Jensen is a 48 y.o. female presenting to the ED for flank pain. The patient states that she began with flank pain approximately 45 minutes prior to arrival in the emergency department. The pain is actually located in the right upper quadrant. Is described as sharp and stabbing. The pain did radiate to her back. Pain is currently rated 7 out of 10. Nothing makes the pain better or worse. No treatment prior to arrival.  The pain has been constant since onset. The patient states that she has had similar pain in the past.  She has not tried any treatments for the pain. The patient states that when she had pain in the past this was secondary to her sphincter of Oddi having stenosis. She has required ERCP approximately every 3 to 4 years for choledocholithiasis. The patient states that this does feel like her normal attack. She does admit to associated nausea, few episodes of emesis. No hematemesis or coffee-ground emesis. She denies any fevers, chest pain, shortness of breath, dysuria, diarrhea or constipation. ROS:   Review of Systems   Constitutional: Negative for chills and fatigue. HENT: Negative for congestion. Eyes: Negative for redness. Respiratory: Negative for shortness of breath. Cardiovascular: Negative for chest pain. Gastrointestinal: Positive for abdominal pain, nausea and vomiting. Negative for constipation and diarrhea. Genitourinary: Negative for dysuria. Musculoskeletal: Negative for arthralgias. Skin: Negative for rash. Neurological: Negative for light-headedness.    Psychiatric/Behavioral: Negative for confusion.       --------------------------------------------- PAST HISTORY ---------------------------------------------  Past Medical History:  has a past medical history of Back pain, Depression with anxiety, Diabetes mellitus ([address] Fatty liver, Gallstones, GERD (gastroesophageal reflux disease), Hyperlipidemia, Hypertension, Metabolic disorder, MTHFR (methylene THF reductase) deficiency and homocystinuria ([facility] [address] MTHFR mutation ([facility] [address] Nasal septal deviation, Preoperative clearance, Sleep apnea, and Unspecified diseases of blood and blood-forming organs. Past Surgical History:  has a past surgical history that includes Hysterectomy; Cholecystectomy; Ectopic pregnancy surgery; ERCP; Sinus endoscopy (Bilateral, 10/27/2014); and Abdomen surgery. Social History:  reports that she has never smoked. She has never used smokeless tobacco. She reports that she does not drink alcohol or use drugs. Family History: family history includes Diabetes in her father and mother; High Blood Pressure in her brother; Other in her brother. The patients home medications have been reviewed. Allergies: Latex; Morphine; Other; Statins; Adhesive tape; and Sulfa antibiotics    ---------------------------------------------------PHYSICAL EXAM--------------------------------------   Constitutional/General: Alert and oriented x3, well appearing, non toxic in NAD  Head: Normocephalic and atraumatic  Mouth: Oropharynx clear, handling secretions, no trismus  Neck: Supple, full ROM,  Pulmonary: Lungs clear to auscultation bilaterally, no wheezes, rales, or rhonchi. Not in respiratory distress  Cardiovascular:  Regular rate. Regular rhythm. No murmurs  Chest: no chest wall tenderness  Abdomen: Soft. Moderately tender in the right upper quadrant, no rebound, rigidity or guarding. There is no CVA tenderness  Musculoskeletal: Moves all extremities x 4. Warm and well perfused, no clubbing, cyanosis, or edema. Capillary refill <3 seconds  Skin: warm and dry. No rashes.    Neurologic: GCS 15, no gross focal neurologic deficits  Psych: Normal Affect    -------------------------------------------------- RESULTS -------------------------------------------------  I have personally reviewed all laboratory and imaging Negative    Ketones, Urine TRACE (A) Negative mg/dL    Specific Gravity, UA 1.010 1.005 - 1.030    Blood, Urine Negative Negative    pH, UA 6.0 5.0 - 9.0    Protein, UA TRACE Negative mg/dL    Urobilinogen, Urine 0.2 <2.0 E.U./dL    Nitrite, Urine Negative Negative    Leukocyte Esterase, Urine Negative Negative   Lactic Acid, Plasma   Result Value Ref Range    Lactic Acid 5.4 (HH) 0.5 - 2.2 mmol/L   Microscopic Urinalysis   Result Value Ref Range    Casts RARE /LPF    WBC, UA NONE 0 - 5 /HPF    RBC, UA NONE 0 - 2 /HPF    Epi Cells RARE /HPF    Bacteria, UA NONE /HPF   POCT Venous   Result Value Ref Range    POC Sodium 136 132 - 146 mmol/L    POC Potassium 4.2 3.5 - 5.0 mmol/L    POC Chloride 97 (L) 100 - 108 mmol/L    CO2 27 22 - 29 mmol/L    POC Anion Gap 12 7 - 16 mmol/L    POC Glucose 437 (H) 74 - 99 mg/dl    POC BUN 11 8 - 23 mg/dL    POC Creatinine 0.8 0.5 - 1.0 mg/dL    GFR Non-African American >60 >=60 mL/min/1.73    GFR African American >60    POCT Glucose   Result Value Ref Range    Glucose 372 mg/dL    QC OK? OK    POCT Glucose   Result Value Ref Range    Meter Glucose 372 (H) 74 - 99 mg/dL       RADIOLOGY:  Interpreted by Radiologist.  CT ABDOMEN PELVIS W IV CONTRAST Additional Contrast? None    (Results Pending)           CT abdomen and pelvis read by [facility]: There is small amount of air in the biliary system. Unremarkable CT scan of the abdomen and pelvis following IV contrast material.  There is no evidence of intra-abdominal mass or abscess. No right renal lesions are seen. Small umbilical hernia containing only fat.  ------------------------- NURSING NOTES AND VITALS REVIEWED ---------------------------   The nursing notes within the ED encounter and vital signs as below have been reviewed by myself.   BP (!) 163/91   Pulse 83   Temp 98.3 °F (36.8 °C) (Oral)   Resp 22   Ht 5' 4\" (1.626 m)   Wt 230 lb (104.3 kg)   SpO2 100%   BMI 39.48 kg/m²   Oxygen Saturation Interpretation: Normal    The patients available past medical records and past encounters were reviewed. ------------------------------ ED COURSE/MEDICAL DECISION MAKING----------------------  Medications   0.9 % sodium chloride bolus (1,000 mLs Intravenous New Bag 1/2/20 0448)   ketorolac (TORADOL) injection 30 mg (30 mg Intravenous Given 1/2/20 0152)   ondansetron (ZOFRAN) injection 4 mg (4 mg Intravenous Given 1/2/20 0152)   0.9 % sodium chloride bolus (0 mLs Intravenous Stopped 1/2/20 0305)   iopamidol (ISOVUE-370) 76 % injection 80 mL (80 mLs Intravenous Given 1/2/20 0240)   0.9 % sodium chloride bolus (0 mLs Intravenous Stopped 1/2/20 0427)   fentaNYL (SUBLIMAZE) injection 50 mcg (50 mcg Intravenous Given 1/2/20 0325)   promethazine (PHENERGAN) injection 25 mg (25 mg Intramuscular Given 1/2/20 0352)   fentaNYL (SUBLIMAZE) injection 50 mcg (50 mcg Intravenous Given 1/2/20 0449)             Medical Decision Making:    MDM  The is a 59-year-old female presenting emergency department the chief complaint of abdominal pain. The patient did have labs and imaging which were reviewed. Patient found to have transaminitis as well as pneumobilia. 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  Patient states this does feel somewhat similar. Labs and imaging reviewed. Case discussed with Dr. Pepito Gallo. Patient will be admitted for further treatment and evaluation. Re-Evaluations/Consultations:             ED Course as of Jan 02 0500   Thu Jan 02, 2020   1840 The patient is in the bed in no acute distress. The results of today were discussed with the patient.   The patient wishes to stay at [place].    [MT]   0400 Spoke with Dr. Pepito Gallo he will accept the patient for admission    [MT]      ED Course User Index  [MT] Mina Art, DO         Critical Care: 0 Minutes        This patient's ED course included: History, physical examination, reevaluation prior to disposition, labs, imaging, telemetry monitoring, EKG, IVF, IV medications    This patient has remained hemodynamically stable during their ED course. Counseling: The emergency provider has spoken with the patient and discussed todays results, in addition to providing specific details for the plan of care and counseling regarding the diagnosis and prognosis. Questions are answered at this time and they are agreeable with the plan.       --------------------------------- IMPRESSION AND DISPOSITION ---------------------------------    IMPRESSION  1. Right upper quadrant abdominal pain    2. Transaminitis    3. Pneumobilia        DISPOSITION  Disposition: Discharge to home  Patient condition is stable        NOTE: This report was transcribed using voice recognition software.  Every effort was made to ensure accuracy; however, inadvertent computerized transcription errors may be present         Thao Carrillo DO  01/02/20 8012

## 2020-01-02 NOTE — PROGRESS NOTES
Pharmacy Note    [address] was ordered [place]-3 capsules 2000mg. As per the [address], herbals and certain dietary supplements will be discontinued.   The herbal or dietary supplement may be continued after discharge from the hospital.
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
## 2020-01-02 NOTE — H&P
History and Physical      CHIEF COMPLAINT:  Flank Pain (Right sided flank pain. Emesis x1. )    History Obtained From:  patient, electronic medical record    HISTORY OF PRESENT ILLNESS:    The patient is a 48 y.o. female with history of multiple medical problems including coagulation defect and biliary complications after cholecystectomy who presented to [facility] emergency room from home after developing right-sided flank pain on the day of admission. Patient states she has had similar pain in the past consistent with her sphincter of Oddi dysfunction requiring ERCPs. Her symptoms were associated with nausea vomiting but no other complaints of fevers, chills, chest pain or shortness of breath.     Past Medical History:    Past Medical History:   Diagnosis Date    Back pain     Depression with anxiety     Diabetes mellitus ([facility] [address]     Fatty liver     Gallstones     GERD (gastroesophageal reflux disease)     Hyperlipidemia     Hypertension     Metabolic disorder     MTHFR (methylene THF reductase) deficiency and homocystinuria (HCC)     MTHFR mutation ([facility] [address]     Nasal septal deviation     procedure 10/27/2014    Preoperative clearance 10/20/2014    medical-Dr. Kim Alexandra; paper copy on chart for procedure 10/27/2014    Sleep apnea     cpap    Unspecified diseases of blood and blood-forming organs      Past Surgical History:    Past Surgical History:   Procedure Laterality Date    ABDOMEN SURGERY      CHOLECYSTECTOMY      ECTOPIC PREGNANCY SURGERY      x  2    ERCP      HYSTERECTOMY      SINUS ENDOSCOPY Bilateral 10/27/2014    BALLOON SINSPLASTY SEPTOPLASTY SUBMUCUSAL RESECTION OF INFERIRO TURBINATES     Medications Prior to Admission:    Medications Prior to Admission: Omega-3 1000 MG CAPS, Take 2,000 mg by mouth 2 times daily  insulin lispro (HUMALOG) 100 UNIT/ML injection vial, Inject 8 Units into the skin 3 times daily (with meals) (Patient taking differently: Inject 0-15 Units into the skin 3 times daily (with meals) )  Insulin Degludec (TRESIBA FLEXTOUCH SC), Inject 60 Units into the skin nightly   diphenhydrAMINE (BENADRYL) 25 MG capsule, Take 25 mg by mouth nightly as needed for Itching  TiZANidine HCl (ZANAFLEX PO), Take 4 mg by mouth nightly   fluticasone (FLONASE) 50 MCG/ACT nasal spray, 2 sprays by Nasal route daily 2 sprays in each nostril daily (Patient taking differently: 2 sprays by Nasal route as needed 2 sprays in each nostril daily)  pantoprazole (PROTONIX) 40 MG tablet, Take 40 mg by mouth nightly   triamterene-hydrochlorothiazide (MAXZIDE) 75-50 MG per tablet, Take 1 tablet by mouth daily. metFORMIN (GLUCOPHAGE) 500 MG tablet, Take 500 mg by mouth daily (with breakfast)   FLUoxetine (PROZAC) 20 MG capsule, Take 20 mg by mouth daily   aspirin 81 MG tablet, Take 81 mg by mouth nightly Last dose 10/18/2014  metoprolol (TOPROL-XL) 50 MG XL tablet, Take 50 mg by mouth 2 times daily. albuterol sulfate HFA (PROVENTIL HFA) 108 (90 Base) MCG/ACT inhaler, Inhale 2 puffs into the lungs every 6 hours as needed for Wheezing  hydrocortisone (ANUSOL-HC) 2.5 % rectal cream, Place rectally 2 times daily. Icosapent Ethyl (VASCEPA PO), Take 2 tablets by mouth 2 times daily 2 gm in AM 2 gm in PM    Allergies:    Latex; Morphine; Other; Statins; Adhesive tape; and Sulfa antibiotics    Social History:    reports that she has never smoked. She has never used smokeless tobacco. She reports that she does not drink alcohol or use drugs.     Family History:   family history includes Diabetes in her father and mother; High Blood Pressure in her brother and brother; Other in her brother. Review of Systems  Please see HPI above. All bolded are positive. All un-bolded are negative.   Gen: fever, chills, fatigue, weakness, diaphoresis, increase in thirst, unintentional weight change, loss of appetite  Head: headache, vision change, hearing loss  Chest: chest pain, chest heaviness, palpitations, orthopnea, 01/02/2020    ALKPHOS 265 01/02/2020     01/02/2020    ALT 83 01/02/2020     Lab Results   Component Value Date    PROTIME 11.8 01/02/2020    INR 1.0 01/02/2020     Recent Labs     01/02/20  0150 01/02/20  1042   CKTOTAL  --  218*   TROPONINI <0.01  --      Lab Results   Component Value Date    NITRU Negative 01/02/2020    COLORU Yellow 01/02/2020    PHUR 6.0 01/02/2020    LABCAST RARE 01/02/2020    WBCUA NONE 01/02/2020    WBCUA 0-1 10/27/2011    RBCUA NONE 01/02/2020    RBCUA 0-1 11/18/2013    BACTERIA NONE 01/02/2020    CLARITYU Clear 01/02/2020    SPECGRAV 1.010 01/02/2020    LEUKOCYTESUR Negative 01/02/2020    UROBILINOGEN 0.2 01/02/2020    BILIRUBINUR Negative 01/02/2020    BILIRUBINUR NEGATIVE 10/27/2011    BLOODU Negative 01/02/2020    GLUCOSEU >=1000 01/02/2020    GLUCOSEU NEGATIVE 10/27/2011    KETUA TRACE 01/02/2020    AMORPHOUS MODERATE 04/20/2015     Lab Results   Component Value Date    MG 1.5 01/18/2019    PHOS 2.7 01/16/2019     Lab Results   Component Value Date    LABA1C 11.1 01/02/2020     Lab Results   Component Value Date    TSH 1.220 01/16/2019    FERRITIN 1,181 01/02/2020    IRON 38 01/02/2020    TIBC 248 01/02/2020    PTRFSAT 11 03/20/2014     Lab Results   Component Value Date    TRIG 492 01/02/2020    HDL 34 01/02/2020    LDLCALC - 01/02/2020    LABVLDL - 01/02/2020     Lab Results   Component Value Date    AMYLASE 18 01/18/2019    LIPASE 58 01/02/2020     No results found for: BNP  Lab Results   Component Value Date    LACTA 5.4 01/02/2020     No results found for: LITHIUM, DILFRTOT, VALPROATE  Lab Results   Component Value Date    PH 7.48 11/18/2013     No results found for: LABAMPH, BARBSCNU, LABBENZ, CANNAB, COCAINESCRN, LABMETH, OPIATESCREENURINE, PHENCYCLIDINESCREENURINE, PPXUR, ETOH  No results found for: [facility]  Lab Results   Component Value Date    VITD25 15 01/09/2019       Radiology  Ct Abdomen Pelvis W Iv Contrast Additional Contrast? None    Result Date: mildly prominent measures 1 cm. There is a noncalcified stone seen within the common bile duct measuring approximately 3 to 4 mm. This stone is not identified on the earlier CT scan. There is diffuse fatty infiltration of liver. Pancreatic head is unremarkable. 1. Choledocholithiasis. The nonshadowing stone measures approximately 3 to 4 mm. MRCP or ERCP is recommended for further evaluation. 2. Hepatic steatosis. . THIS IS AN ABNORMAL REPORT. PLEASE TAKE APPROPRIATE MEASURES. ASSESSMENT:    Principal Problem:    Abdominal pain  Active Problems:    Elevated LFTs    Hyperlipidemia    Allergic rhinitis due to allergen    MTHFR mutation (HCC)    Pneumobilia    Obstructive jaundice    Diabetes mellitus ([facility] [address]  Resolved Problems:    * No resolved hospital problems. *    PLAN:  Observation  GI consult as ultrasound is recommending MRCP versus ERCP  Continue pain control and antiemetics along with IV fluids in the meantime  Will need improved blood sugar control as well  DVT prophylaxis    Time spent face to face with patient along with family counseling and discussing care exceeded 50% of the time of the visit. Additional time spent reviewing images and labs, discussing case with nursing, support staff and other physicians; as well as coordinating care. Yifan Johnson MD  6:58 PM  1/2/2020    NOTE:  This report was transcribed using voice recognition software. Every effort was made to ensure accuracy; however, inadvertent computerized transcription errors may be present.

## 2020-01-03 ENCOUNTER — ANESTHESIA EVENT (OUTPATIENT)
Dept: OPERATING ROOM | Age: 50
DRG: 446 | End: 2020-01-03
Payer: COMMERCIAL

## 2020-01-03 LAB
ALBUMIN SERPL-MCNC: 3 G/DL (ref 3.5–5.2)
ALP BLD-CCNC: 256 U/L (ref 35–104)
ALPHA-1 ANTITRYPSIN: 113 MG/DL (ref 90–200)
ALT SERPL-CCNC: 309 U/L (ref 0–32)
ANION GAP SERPL CALCULATED.3IONS-SCNC: 12 MMOL/L (ref 7–16)
ANTI-MITOCHONDRIAL AB, IFA: NEGATIVE
ANTI-NUCLEAR ANTIBODY (ANA): NEGATIVE
AST SERPL-CCNC: 442 U/L (ref 0–31)
BASOPHILS ABSOLUTE: 0.01 E9/L (ref 0–0.2)
BASOPHILS RELATIVE PERCENT: 0.1 % (ref 0–2)
BILIRUB SERPL-MCNC: 4.6 MG/DL (ref 0–1.2)
BUN BLDV-MCNC: 9 MG/DL (ref 6–20)
CALCIUM SERPL-MCNC: 8.4 MG/DL (ref 8.6–10.2)
CHLORIDE BLD-SCNC: 96 MMOL/L (ref 98–107)
CO2: 27 MMOL/L (ref 22–29)
CREAT SERPL-MCNC: 0.9 MG/DL (ref 0.5–1)
EOSINOPHILS ABSOLUTE: 0.05 E9/L (ref 0.05–0.5)
EOSINOPHILS RELATIVE PERCENT: 0.6 % (ref 0–6)
GFR AFRICAN AMERICAN: >60
GFR NON-AFRICAN AMERICAN: >60 ML/MIN/1.73
GLUCOSE BLD-MCNC: 278 MG/DL (ref 74–99)
HAV IGM SER IA-ACNC: NORMAL
HCG(URINE) PREGNANCY TEST: NEGATIVE
HCT VFR BLD CALC: 34.3 % (ref 34–48)
HEMOGLOBIN: 11.4 G/DL (ref 11.5–15.5)
HEPATITIS B CORE IGM ANTIBODY: NORMAL
HEPATITIS B SURFACE ANTIGEN INTERPRETATION: NORMAL
HEPATITIS C ANTIBODY INTERPRETATION: NORMAL
IMMATURE GRANULOCYTES #: 0.03 E9/L
IMMATURE GRANULOCYTES %: 0.4 % (ref 0–5)
LYMPHOCYTES ABSOLUTE: 1 E9/L (ref 1.5–4)
LYMPHOCYTES RELATIVE PERCENT: 12.3 % (ref 20–42)
MCH RBC QN AUTO: 28.4 PG (ref 26–35)
MCHC RBC AUTO-ENTMCNC: 33.2 % (ref 32–34.5)
MCV RBC AUTO: 85.3 FL (ref 80–99.9)
METER GLUCOSE: 174 MG/DL (ref 74–99)
METER GLUCOSE: 253 MG/DL (ref 74–99)
METER GLUCOSE: 322 MG/DL (ref 74–99)
METER GLUCOSE: 330 MG/DL (ref 74–99)
MONOCYTES ABSOLUTE: 0.44 E9/L (ref 0.1–0.95)
MONOCYTES RELATIVE PERCENT: 5.4 % (ref 2–12)
NEUTROPHILS ABSOLUTE: 6.63 E9/L (ref 1.8–7.3)
NEUTROPHILS RELATIVE PERCENT: 81.2 % (ref 43–80)
PDW BLD-RTO: 12.3 FL (ref 11.5–15)
PLATELET # BLD: 223 E9/L (ref 130–450)
PMV BLD AUTO: 10.6 FL (ref 7–12)
POTASSIUM SERPL-SCNC: 3.8 MMOL/L (ref 3.5–5)
RBC # BLD: 4.02 E12/L (ref 3.5–5.5)
SMOOTH MUSCLE ANTIBODY: NEGATIVE
SODIUM BLD-SCNC: 135 MMOL/L (ref 132–146)
TOTAL PROTEIN: 6.3 G/DL (ref 6.4–8.3)
WBC # BLD: 8.2 E9/L (ref 4.5–11.5)

## 2020-01-03 PROCEDURE — 82962 GLUCOSE BLOOD TEST: CPT

## 2020-01-03 PROCEDURE — 80053 COMPREHEN METABOLIC PANEL: CPT

## 2020-01-03 PROCEDURE — 1200000000 HC SEMI PRIVATE

## 2020-01-03 PROCEDURE — 6370000000 HC RX 637 (ALT 250 FOR IP): Performed by: INTERNAL MEDICINE

## 2020-01-03 PROCEDURE — C9113 INJ PANTOPRAZOLE SODIUM, VIA: HCPCS | Performed by: INTERNAL MEDICINE

## 2020-01-03 PROCEDURE — 6360000002 HC RX W HCPCS: Performed by: INTERNAL MEDICINE

## 2020-01-03 PROCEDURE — 85025 COMPLETE CBC W/AUTO DIFF WBC: CPT

## 2020-01-03 PROCEDURE — 81025 URINE PREGNANCY TEST: CPT

## 2020-01-03 PROCEDURE — 36415 COLL VENOUS BLD VENIPUNCTURE: CPT

## 2020-01-03 RX ORDER — TRAMADOL HYDROCHLORIDE 50 MG/1
50 TABLET ORAL EVERY 6 HOURS PRN
Status: DISCONTINUED | OUTPATIENT
Start: 2020-01-03 | End: 2020-01-04

## 2020-01-03 RX ORDER — CIPROFLOXACIN 2 MG/ML
400 INJECTION, SOLUTION INTRAVENOUS
Status: COMPLETED | OUTPATIENT
Start: 2020-01-04 | End: 2020-01-04

## 2020-01-03 RX ADMIN — INSULIN LISPRO 6 UNITS: 100 INJECTION, SOLUTION INTRAVENOUS; SUBCUTANEOUS at 08:46

## 2020-01-03 RX ADMIN — INSULIN GLARGINE 50 UNITS: 100 INJECTION, SOLUTION SUBCUTANEOUS at 21:03

## 2020-01-03 RX ADMIN — PANTOPRAZOLE SODIUM 40 MG: 40 INJECTION, POWDER, FOR SOLUTION INTRAVENOUS at 08:45

## 2020-01-03 RX ADMIN — INSULIN LISPRO 8 UNITS: 100 INJECTION, SOLUTION INTRAVENOUS; SUBCUTANEOUS at 13:05

## 2020-01-03 RX ADMIN — TRIAMTERENE AND HYDROCHLOROTHIAZIDE 2 TABLET: 37.5; 25 TABLET ORAL at 08:46

## 2020-01-03 RX ADMIN — ENOXAPARIN SODIUM 40 MG: 40 INJECTION SUBCUTANEOUS at 21:03

## 2020-01-03 RX ADMIN — METOPROLOL SUCCINATE 50 MG: 50 TABLET, EXTENDED RELEASE ORAL at 20:57

## 2020-01-03 RX ADMIN — INSULIN LISPRO 4 UNITS: 100 INJECTION, SOLUTION INTRAVENOUS; SUBCUTANEOUS at 20:59

## 2020-01-03 RX ADMIN — FLUOXETINE 20 MG: 20 CAPSULE ORAL at 08:45

## 2020-01-03 RX ADMIN — METOPROLOL SUCCINATE 50 MG: 50 TABLET, EXTENDED RELEASE ORAL at 08:45

## 2020-01-03 RX ADMIN — INSULIN LISPRO 2 UNITS: 100 INJECTION, SOLUTION INTRAVENOUS; SUBCUTANEOUS at 16:24

## 2020-01-03 RX ADMIN — TIZANIDINE 4 MG: 4 TABLET ORAL at 20:57

## 2020-01-03 ASSESSMENT — PAIN SCALES - GENERAL
PAINLEVEL_OUTOF10: 2
PAINLEVEL_OUTOF10: 0
PAINLEVEL_OUTOF10: 0

## 2020-01-03 ASSESSMENT — PAIN - FUNCTIONAL ASSESSMENT: PAIN_FUNCTIONAL_ASSESSMENT: PREVENTS OR INTERFERES SOME ACTIVE ACTIVITIES AND ADLS

## 2020-01-03 ASSESSMENT — PAIN DESCRIPTION - LOCATION: LOCATION: ABDOMEN

## 2020-01-03 ASSESSMENT — PAIN DESCRIPTION - ONSET: ONSET: GRADUAL

## 2020-01-03 ASSESSMENT — PAIN DESCRIPTION - PAIN TYPE: TYPE: ACUTE PAIN

## 2020-01-03 ASSESSMENT — PAIN DESCRIPTION - FREQUENCY: FREQUENCY: CONTINUOUS

## 2020-01-03 ASSESSMENT — PAIN DESCRIPTION - ORIENTATION: ORIENTATION: OUTER

## 2020-01-03 ASSESSMENT — PAIN DESCRIPTION - PROGRESSION: CLINICAL_PROGRESSION: GRADUALLY WORSENING

## 2020-01-03 ASSESSMENT — PAIN DESCRIPTION - DESCRIPTORS: DESCRIPTORS: ACHING;CRUSHING;DISCOMFORT

## 2020-01-03 NOTE — PROGRESS NOTES
PROGRESS NOTE  By Gerson Kauffman M.D. [facility]  Dr. Jacque Lopez M.D.,  Dr. Jenn Hayes M.D.,   EDUARDA HairstonO.,  Dr. Erin Polanco M.D.,  Dr Nicole Mckeon D.O. [facility]  48 y.o.  female    SUBJECTIVE:  Somewhat improved abdominal pain    OBJECTIVE:    /63   Pulse 85   Temp 98.3 °F (36.8 °C) (Oral)   Resp 18   Ht 5' 4\" (1.626 m)   Wt 230 lb (104.3 kg)   SpO2 97%   BMI 39.48 kg/m²     General: Obese  female. NAD. AAO x3  HEENT: Anicteric sclera/moist mucosa  Neck: Supple with trachea midline  Chest: Symmetrical excursion/nonlabored respirations  Abd.: Soft and obese. Appears nontender  Extr.:  No peripheral edema  Skin: Warm and dry      DATA:  Stool (measured) : 0 mL  Lab Results   Component Value Date    WBC 8.2 01/03/2020    RBC 4.02 01/03/2020    HGB 11.4 01/03/2020    HCT 34.3 01/03/2020    MCV 85.3 01/03/2020    MCH 28.4 01/03/2020    MCHC 33.2 01/03/2020    RDW 12.3 01/03/2020     01/03/2020    MPV 10.6 01/03/2020     Lab Results   Component Value Date     01/03/2020    K 3.8 01/03/2020    K 4.3 01/02/2020    CL 96 01/03/2020    CO2 27 01/03/2020    BUN 9 01/03/2020    CREATININE 0.9 01/03/2020    CALCIUM 8.4 01/03/2020    PROT 6.3 01/03/2020    LABALBU 3.0 01/03/2020    LABALBU 4.0 10/27/2011    BILITOT 4.6 01/03/2020    ALKPHOS 256 01/03/2020     01/03/2020     01/03/2020     Lab Results   Component Value Date    LIPASE 58 01/02/2020     Lab Results   Component Value Date    AMYLASE 18 01/18/2019         ASSESSMENT/PLAN:  1. Abdominal pain/elevated liver chemistries (mixed)/recurrent choledocolithiasis   -Multiple prior ERCP  -Worsening hyperbilirubinemia -dominantly direct  - Pneumobilia present similar to prior imaging studies  - ? NAFLD component   - Fair amount of retained food and fluid in stomach on CT ?   DM gastroparesis  - Prior hx of biliary stones post ERCP x2, last performed by Dr. Monse Nails in 2015  -

## 2020-01-03 NOTE — PROGRESS NOTES
mesenteric fat only. Us Abdomen Limited    Result Date: 1/2/2020  Reading location:  200 HISTORY: Elevated LFTs Real-time ultrasound examination the right upper quadrant was performed. Correlation is made with patient's previous CT scan obtained earlier today. The gallbladder is surgically absent. The common bile duct is mildly prominent measures 1 cm. There is a noncalcified stone seen within the common bile duct measuring approximately 3 to 4 mm. This stone is not identified on the earlier CT scan. There is diffuse fatty infiltration of liver. Pancreatic head is unremarkable. 1. Choledocholithiasis. The nonshadowing stone measures approximately 3 to 4 mm. MRCP or ERCP is recommended for further evaluation. 2. Hepatic steatosis. . THIS IS AN ABNORMAL REPORT. PLEASE TAKE APPROPRIATE MEASURES. Assessment:  Jonathan Salgado is a 48y.o. year old female who presented on 1/2/2020 and is being treated for:  Principal Problem:    Abdominal pain  Active Problems:    Elevated LFTs    Hyperlipidemia    Allergic rhinitis due to allergen    MTHFR mutation ([address]    Pneumobilia    Obstructive jaundice    Diabetes mellitus ([address]  Resolved Problems:    * No resolved hospital problems. *    Plan  · ERCP planned for tomorrow  · Can discontinue IV pain control and change to p.o.  · Continue IV fluids in the meantime  · Please see orders for further management and care.     Jefry Santana MD  6:29 PM  1/3/2020

## 2020-01-03 NOTE — ANESTHESIA PRE PROCEDURE
Department of Anesthesiology  Preprocedure Note       Name:  Remi Garcia   Age:  48 y.o.  :  1970                                          MRN:  [id number]         Date:  1/3/2020      Surgeon: Kaitlin Vasquez):  Basia Alvarado MD    Procedure: ERCP ENDOSCOPIC RETROGRADE CHOLANGIOPANCREATOGRAPHY (N/A )    Medications prior to admission:   Prior to Admission medications    Medication Sig Start Date End Date Taking? Authorizing Provider   Omega-3 1000 MG CAPS Take 2,000 mg by mouth 2 times daily   Yes Historical Provider, MD   insulin lispro (HUMALOG) 100 UNIT/ML injection vial Inject 8 Units into the skin 3 times daily (with meals)  Patient taking differently: Inject 0-15 Units into the skin 3 times daily (with meals)  19  Yes Rufino Mojica DO   Insulin Degludec (TRESIBA FLEXTOUCH SC) Inject 60 Units into the skin nightly    Yes Historical Provider, MD   diphenhydrAMINE (BENADRYL) 25 MG capsule Take 25 mg by mouth nightly as needed for Itching   Yes Historical Provider, MD   TiZANidine HCl (ZANAFLEX PO) Take 4 mg by mouth nightly    Yes Historical Provider, MD   fluticasone (FLONASE) 50 MCG/ACT nasal spray 2 sprays by Nasal route daily 2 sprays in each nostril daily  Patient taking differently: 2 sprays by Nasal route as needed 2 sprays in each nostril daily 3/23/16  Yes Stefan Phoenix Pascolini, DO   pantoprazole (PROTONIX) 40 MG tablet Take 40 mg by mouth nightly    Yes Historical Provider, MD   triamterene-hydrochlorothiazide (MAXZIDE) 75-50 MG per tablet Take 1 tablet by mouth daily. Yes Historical Provider, MD   metFORMIN (GLUCOPHAGE) 500 MG tablet Take 500 mg by mouth daily (with breakfast)    Yes Historical Provider, MD   FLUoxetine (PROZAC) 20 MG capsule Take 20 mg by mouth daily    Yes Historical Provider, MD   aspirin 81 MG tablet Take 81 mg by mouth nightly Last dose 10/18/2014   Yes Historical Provider, MD   metoprolol (TOPROL-XL) 50 MG XL tablet Take 50 mg by mouth 2 times daily.     Yes Sleep apnea     cpap    Unspecified diseases of blood and blood-forming organs        Past Surgical History:        Procedure Laterality Date    ABDOMEN SURGERY      CHOLECYSTECTOMY      ECTOPIC PREGNANCY SURGERY      x  2    ERCP      HYSTERECTOMY      SINUS ENDOSCOPY Bilateral 10/27/2014    BALLOON SINSPLASTY SEPTOPLASTY SUBMUCUSAL RESECTION OF INFERIRO TURBINATES       Social History:    Social History     Tobacco Use    Smoking status: Never Smoker    Smokeless tobacco: Never Used   Substance Use Topics    Alcohol use: No     Alcohol/week: 0.0 standard drinks     Comment: once in a great while                                Counseling given: Not Answered      Vital Signs (Current):   Vitals:    01/02/20 0837 01/02/20 1545 01/02/20 2023 01/03/20 0800   BP: (!) 147/72  125/70 102/63   Pulse: 100  90 85   Resp: 18   18   Temp: 97.9 °F (36.6 °C)   98.3 °F (36.8 °C)   TempSrc: Oral   Oral   SpO2: 96% 97%  97%   Weight:       Height:                                                  BP Readings from Last 3 Encounters:   01/03/20 102/63   06/12/19 114/79   02/22/19 121/83       NPO Status:                                                                                 BMI:   Wt Readings from Last 3 Encounters:   01/02/20 230 lb (104.3 kg)   06/12/19 233 lb (105.7 kg)   02/22/19 234 lb 12.8 oz (106.5 kg)     Body mass index is 39.48 kg/m².     CBC:   Lab Results   Component Value Date    WBC 8.2 01/03/2020    RBC 4.02 01/03/2020    HGB 11.4 01/03/2020    HCT 34.3 01/03/2020    MCV 85.3 01/03/2020    RDW 12.3 01/03/2020     01/03/2020       CMP:   Lab Results   Component Value Date     01/03/2020    K 3.8 01/03/2020    K 4.3 01/02/2020    CL 96 01/03/2020    CO2 27 01/03/2020    BUN 9 01/03/2020    CREATININE 0.9 01/03/2020    GFRAA >60 01/03/2020    LABGLOM >60 01/03/2020    GLUCOSE 278 01/03/2020    GLUCOSE 150 02/23/2012    PROT 6.3 01/03/2020    CALCIUM 8.4 01/03/2020    BILITOT 4.6 01/03/2020

## 2020-01-04 ENCOUNTER — APPOINTMENT (OUTPATIENT)
Dept: GENERAL RADIOLOGY | Age: 50
DRG: 446 | End: 2020-01-04
Payer: COMMERCIAL

## 2020-01-04 ENCOUNTER — ANESTHESIA (OUTPATIENT)
Dept: OPERATING ROOM | Age: 50
DRG: 446 | End: 2020-01-04
Payer: COMMERCIAL

## 2020-01-04 VITALS
RESPIRATION RATE: 20 BRPM | OXYGEN SATURATION: 96 % | SYSTOLIC BLOOD PRESSURE: 93 MMHG | DIASTOLIC BLOOD PRESSURE: 72 MMHG | TEMPERATURE: 97.7 F

## 2020-01-04 LAB
CERULOPLASMIN: 31 MG/DL (ref 16–45)
EKG ATRIAL RATE: 76 BPM
EKG P AXIS: 51 DEGREES
EKG P-R INTERVAL: 136 MS
EKG Q-T INTERVAL: 436 MS
EKG QRS DURATION: 90 MS
EKG QTC CALCULATION (BAZETT): 490 MS
EKG R AXIS: -22 DEGREES
EKG T AXIS: 28 DEGREES
EKG VENTRICULAR RATE: 76 BPM
METER GLUCOSE: 219 MG/DL (ref 74–99)
METER GLUCOSE: 255 MG/DL (ref 74–99)
METER GLUCOSE: 391 MG/DL (ref 74–99)
METER GLUCOSE: 463 MG/DL (ref 74–99)
URINE CULTURE, ROUTINE: NORMAL

## 2020-01-04 PROCEDURE — 2580000003 HC RX 258: Performed by: INTERNAL MEDICINE

## 2020-01-04 PROCEDURE — 6370000000 HC RX 637 (ALT 250 FOR IP): Performed by: INTERNAL MEDICINE

## 2020-01-04 PROCEDURE — C1726 CATH, BAL DIL, NON-VASCULAR: HCPCS | Performed by: SPECIALIST

## 2020-01-04 PROCEDURE — C9113 INJ PANTOPRAZOLE SODIUM, VIA: HCPCS | Performed by: INTERNAL MEDICINE

## 2020-01-04 PROCEDURE — 3700000000 HC ANESTHESIA ATTENDED CARE: Performed by: SPECIALIST

## 2020-01-04 PROCEDURE — 3600007501: Performed by: SPECIALIST

## 2020-01-04 PROCEDURE — 1200000000 HC SEMI PRIVATE

## 2020-01-04 PROCEDURE — 6360000002 HC RX W HCPCS: Performed by: NURSE ANESTHETIST, CERTIFIED REGISTERED

## 2020-01-04 PROCEDURE — C1769 GUIDE WIRE: HCPCS | Performed by: SPECIALIST

## 2020-01-04 PROCEDURE — 6360000002 HC RX W HCPCS

## 2020-01-04 PROCEDURE — 3700000001 HC ADD 15 MINUTES (ANESTHESIA): Performed by: SPECIALIST

## 2020-01-04 PROCEDURE — 2709999900 HC NON-CHARGEABLE SUPPLY: Performed by: SPECIALIST

## 2020-01-04 PROCEDURE — 0F798DZ DILATION OF COMMON BILE DUCT WITH INTRALUMINAL DEVICE, VIA NATURAL OR ARTIFICIAL OPENING ENDOSCOPIC: ICD-10-PCS | Performed by: INTERNAL MEDICINE

## 2020-01-04 PROCEDURE — 93010 ELECTROCARDIOGRAM REPORT: CPT | Performed by: INTERNAL MEDICINE

## 2020-01-04 PROCEDURE — 82962 GLUCOSE BLOOD TEST: CPT

## 2020-01-04 PROCEDURE — 2580000003 HC RX 258: Performed by: NURSE ANESTHETIST, CERTIFIED REGISTERED

## 2020-01-04 PROCEDURE — 7100000001 HC PACU RECOVERY - ADDTL 15 MIN: Performed by: SPECIALIST

## 2020-01-04 PROCEDURE — 2500000003 HC RX 250 WO HCPCS: Performed by: NURSE ANESTHETIST, CERTIFIED REGISTERED

## 2020-01-04 PROCEDURE — 2720000010 HC SURG SUPPLY STERILE: Performed by: SPECIALIST

## 2020-01-04 PROCEDURE — 7100000000 HC PACU RECOVERY - FIRST 15 MIN: Performed by: SPECIALIST

## 2020-01-04 PROCEDURE — 6360000002 HC RX W HCPCS: Performed by: ANESTHESIOLOGY

## 2020-01-04 PROCEDURE — 3600007511: Performed by: SPECIALIST

## 2020-01-04 PROCEDURE — 6360000002 HC RX W HCPCS: Performed by: HOSPITALIST

## 2020-01-04 PROCEDURE — C2625 STENT, NON-COR, TEM W/DEL SY: HCPCS | Performed by: SPECIALIST

## 2020-01-04 PROCEDURE — 74330 X-RAY BILE/PANC ENDOSCOPY: CPT

## 2020-01-04 PROCEDURE — 6360000002 HC RX W HCPCS: Performed by: INTERNAL MEDICINE

## 2020-01-04 DEVICE — BILIARY STENT WITH NAVIFLEXTM RX DELIVERY SYSTEM
Type: IMPLANTABLE DEVICE | Site: BILE DUCT | Status: FUNCTIONAL
Brand: ADVANIX™ BILIARY

## 2020-01-04 RX ORDER — PROPOFOL 10 MG/ML
INJECTION, EMULSION INTRAVENOUS PRN
Status: DISCONTINUED | OUTPATIENT
Start: 2020-01-04 | End: 2020-01-04 | Stop reason: SDUPTHER

## 2020-01-04 RX ORDER — HYDROMORPHONE HYDROCHLORIDE 1 MG/ML
0.25 INJECTION, SOLUTION INTRAMUSCULAR; INTRAVENOUS; SUBCUTANEOUS EVERY 5 MIN PRN
Status: DISCONTINUED | OUTPATIENT
Start: 2020-01-04 | End: 2020-01-04 | Stop reason: HOSPADM

## 2020-01-04 RX ORDER — ONDANSETRON 2 MG/ML
INJECTION INTRAMUSCULAR; INTRAVENOUS PRN
Status: DISCONTINUED | OUTPATIENT
Start: 2020-01-04 | End: 2020-01-04 | Stop reason: SDUPTHER

## 2020-01-04 RX ORDER — MEPERIDINE HYDROCHLORIDE 25 MG/ML
12.5 INJECTION INTRAMUSCULAR; INTRAVENOUS; SUBCUTANEOUS EVERY 5 MIN PRN
Status: DISCONTINUED | OUTPATIENT
Start: 2020-01-04 | End: 2020-01-04 | Stop reason: HOSPADM

## 2020-01-04 RX ORDER — ROCURONIUM BROMIDE 10 MG/ML
INJECTION, SOLUTION INTRAVENOUS PRN
Status: DISCONTINUED | OUTPATIENT
Start: 2020-01-04 | End: 2020-01-04 | Stop reason: SDUPTHER

## 2020-01-04 RX ORDER — GLYCOPYRROLATE 1 MG/5 ML
SYRINGE (ML) INTRAVENOUS PRN
Status: DISCONTINUED | OUTPATIENT
Start: 2020-01-04 | End: 2020-01-04 | Stop reason: SDUPTHER

## 2020-01-04 RX ORDER — ONDANSETRON 2 MG/ML
4 INJECTION INTRAMUSCULAR; INTRAVENOUS
Status: COMPLETED | OUTPATIENT
Start: 2020-01-04 | End: 2020-01-04

## 2020-01-04 RX ORDER — MIDAZOLAM HYDROCHLORIDE 1 MG/ML
INJECTION INTRAMUSCULAR; INTRAVENOUS PRN
Status: DISCONTINUED | OUTPATIENT
Start: 2020-01-04 | End: 2020-01-04 | Stop reason: SDUPTHER

## 2020-01-04 RX ORDER — FENTANYL CITRATE 50 UG/ML
INJECTION, SOLUTION INTRAMUSCULAR; INTRAVENOUS PRN
Status: DISCONTINUED | OUTPATIENT
Start: 2020-01-04 | End: 2020-01-04 | Stop reason: SDUPTHER

## 2020-01-04 RX ORDER — MEPERIDINE HYDROCHLORIDE 50 MG/ML
12.5 INJECTION INTRAMUSCULAR; INTRAVENOUS; SUBCUTANEOUS EVERY 5 MIN PRN
Status: DISCONTINUED | OUTPATIENT
Start: 2020-01-04 | End: 2020-01-04 | Stop reason: SDUPTHER

## 2020-01-04 RX ORDER — HYDRALAZINE HYDROCHLORIDE 20 MG/ML
5 INJECTION INTRAMUSCULAR; INTRAVENOUS EVERY 10 MIN PRN
Status: DISCONTINUED | OUTPATIENT
Start: 2020-01-04 | End: 2020-01-04 | Stop reason: HOSPADM

## 2020-01-04 RX ORDER — ONDANSETRON 2 MG/ML
INJECTION INTRAMUSCULAR; INTRAVENOUS
Status: COMPLETED
Start: 2020-01-04 | End: 2020-01-04

## 2020-01-04 RX ORDER — HYDROCODONE BITARTRATE AND ACETAMINOPHEN 7.5; 325 MG/1; MG/1
1 TABLET ORAL EVERY 6 HOURS PRN
Status: DISCONTINUED | OUTPATIENT
Start: 2020-01-04 | End: 2020-01-06 | Stop reason: HOSPADM

## 2020-01-04 RX ORDER — LABETALOL HYDROCHLORIDE 5 MG/ML
5 INJECTION, SOLUTION INTRAVENOUS EVERY 10 MIN PRN
Status: DISCONTINUED | OUTPATIENT
Start: 2020-01-04 | End: 2020-01-04 | Stop reason: HOSPADM

## 2020-01-04 RX ORDER — PROMETHAZINE HYDROCHLORIDE 25 MG/ML
6.25 INJECTION, SOLUTION INTRAMUSCULAR; INTRAVENOUS
Status: DISCONTINUED | OUTPATIENT
Start: 2020-01-04 | End: 2020-01-04 | Stop reason: HOSPADM

## 2020-01-04 RX ORDER — DEXAMETHASONE SODIUM PHOSPHATE 10 MG/ML
INJECTION, SOLUTION INTRAMUSCULAR; INTRAVENOUS PRN
Status: DISCONTINUED | OUTPATIENT
Start: 2020-01-04 | End: 2020-01-04 | Stop reason: SDUPTHER

## 2020-01-04 RX ORDER — NEOSTIGMINE METHYLSULFATE 1 MG/ML
INJECTION, SOLUTION INTRAVENOUS PRN
Status: DISCONTINUED | OUTPATIENT
Start: 2020-01-04 | End: 2020-01-04 | Stop reason: SDUPTHER

## 2020-01-04 RX ORDER — LIDOCAINE HYDROCHLORIDE 20 MG/ML
INJECTION, SOLUTION INTRAVENOUS PRN
Status: DISCONTINUED | OUTPATIENT
Start: 2020-01-04 | End: 2020-01-04 | Stop reason: SDUPTHER

## 2020-01-04 RX ORDER — MEPERIDINE HYDROCHLORIDE 25 MG/ML
INJECTION INTRAMUSCULAR; INTRAVENOUS; SUBCUTANEOUS
Status: DISPENSED
Start: 2020-01-04 | End: 2020-01-05

## 2020-01-04 RX ORDER — HYDROMORPHONE HYDROCHLORIDE 1 MG/ML
0.5 INJECTION, SOLUTION INTRAMUSCULAR; INTRAVENOUS; SUBCUTANEOUS EVERY 5 MIN PRN
Status: DISCONTINUED | OUTPATIENT
Start: 2020-01-04 | End: 2020-01-04 | Stop reason: HOSPADM

## 2020-01-04 RX ORDER — SODIUM CHLORIDE 9 MG/ML
INJECTION, SOLUTION INTRAVENOUS CONTINUOUS PRN
Status: DISCONTINUED | OUTPATIENT
Start: 2020-01-04 | End: 2020-01-04 | Stop reason: SDUPTHER

## 2020-01-04 RX ADMIN — SODIUM CHLORIDE, PRESERVATIVE FREE 10 ML: 5 INJECTION INTRAVENOUS at 08:49

## 2020-01-04 RX ADMIN — PROPOFOL 200 MG: 10 INJECTION, EMULSION INTRAVENOUS at 11:17

## 2020-01-04 RX ADMIN — INSULIN GLARGINE 50 UNITS: 100 INJECTION, SOLUTION SUBCUTANEOUS at 21:05

## 2020-01-04 RX ADMIN — PANTOPRAZOLE SODIUM 40 MG: 40 INJECTION, POWDER, FOR SOLUTION INTRAVENOUS at 08:49

## 2020-01-04 RX ADMIN — SODIUM CHLORIDE 80 ML/HR: 9 INJECTION, SOLUTION INTRAVENOUS at 00:02

## 2020-01-04 RX ADMIN — MEPERIDINE HYDROCHLORIDE 12.5 MG: 50 INJECTION, SOLUTION INTRAMUSCULAR; INTRAVENOUS; SUBCUTANEOUS at 12:48

## 2020-01-04 RX ADMIN — Medication 30 MG: at 11:17

## 2020-01-04 RX ADMIN — SODIUM CHLORIDE: 9 INJECTION, SOLUTION INTRAVENOUS at 23:38

## 2020-01-04 RX ADMIN — DEXAMETHASONE SODIUM PHOSPHATE 10 MG: 10 INJECTION, SOLUTION INTRAMUSCULAR; INTRAVENOUS at 11:23

## 2020-01-04 RX ADMIN — METOPROLOL SUCCINATE 50 MG: 50 TABLET, EXTENDED RELEASE ORAL at 21:04

## 2020-01-04 RX ADMIN — ONDANSETRON 8 MG: 2 INJECTION INTRAMUSCULAR; INTRAVENOUS at 20:03

## 2020-01-04 RX ADMIN — ENOXAPARIN SODIUM 40 MG: 40 INJECTION SUBCUTANEOUS at 20:03

## 2020-01-04 RX ADMIN — Medication 3 MG: at 12:09

## 2020-01-04 RX ADMIN — FENTANYL CITRATE 50 MCG: 50 INJECTION, SOLUTION INTRAMUSCULAR; INTRAVENOUS at 12:01

## 2020-01-04 RX ADMIN — LIDOCAINE HYDROCHLORIDE 80 MG: 20 INJECTION, SOLUTION INTRAVENOUS at 11:17

## 2020-01-04 RX ADMIN — CIPROFLOXACIN 400 MG: 2 INJECTION, SOLUTION INTRAVENOUS at 10:45

## 2020-01-04 RX ADMIN — Medication 0.2 MG: at 11:13

## 2020-01-04 RX ADMIN — ONDANSETRON 4 MG: 2 INJECTION INTRAMUSCULAR; INTRAVENOUS at 12:42

## 2020-01-04 RX ADMIN — TIZANIDINE 4 MG: 4 TABLET ORAL at 21:04

## 2020-01-04 RX ADMIN — FENTANYL CITRATE 50 MCG: 50 INJECTION, SOLUTION INTRAMUSCULAR; INTRAVENOUS at 11:17

## 2020-01-04 RX ADMIN — INSULIN LISPRO 10 UNITS: 100 INJECTION, SOLUTION INTRAVENOUS; SUBCUTANEOUS at 16:31

## 2020-01-04 RX ADMIN — Medication 0.6 MG: at 12:09

## 2020-01-04 RX ADMIN — ONDANSETRON HYDROCHLORIDE 4 MG: 2 INJECTION, SOLUTION INTRAMUSCULAR; INTRAVENOUS at 11:23

## 2020-01-04 RX ADMIN — INDOMETHACIN 100 MG: 50 SUPPOSITORY RECTAL at 12:42

## 2020-01-04 RX ADMIN — SODIUM CHLORIDE: 9 INJECTION, SOLUTION INTRAVENOUS at 15:13

## 2020-01-04 RX ADMIN — INSULIN LISPRO 6 UNITS: 100 INJECTION, SOLUTION INTRAVENOUS; SUBCUTANEOUS at 21:05

## 2020-01-04 RX ADMIN — MIDAZOLAM 2 MG: 1 INJECTION INTRAMUSCULAR; INTRAVENOUS at 11:13

## 2020-01-04 RX ADMIN — SODIUM CHLORIDE: 9 INJECTION, SOLUTION INTRAVENOUS at 10:57

## 2020-01-04 RX ADMIN — MEPERIDINE HYDROCHLORIDE 12.5 MG: 50 INJECTION, SOLUTION INTRAMUSCULAR; INTRAVENOUS; SUBCUTANEOUS at 12:55

## 2020-01-04 ASSESSMENT — PAIN DESCRIPTION - PROGRESSION
CLINICAL_PROGRESSION: GRADUALLY IMPROVING
CLINICAL_PROGRESSION: NOT CHANGED
CLINICAL_PROGRESSION: NOT CHANGED

## 2020-01-04 ASSESSMENT — PULMONARY FUNCTION TESTS
PIF_VALUE: 33
PIF_VALUE: 36
PIF_VALUE: 32
PIF_VALUE: 0
PIF_VALUE: 31
PIF_VALUE: 31
PIF_VALUE: 38
PIF_VALUE: 30
PIF_VALUE: 28
PIF_VALUE: 28
PIF_VALUE: 31
PIF_VALUE: 32
PIF_VALUE: 32
PIF_VALUE: 26
PIF_VALUE: 31
PIF_VALUE: 28
PIF_VALUE: 30
PIF_VALUE: 23
PIF_VALUE: 31
PIF_VALUE: 32
PIF_VALUE: 31
PIF_VALUE: 1
PIF_VALUE: 31
PIF_VALUE: 29
PIF_VALUE: 23
PIF_VALUE: 31
PIF_VALUE: 16
PIF_VALUE: 31
PIF_VALUE: 31
PIF_VALUE: 32
PIF_VALUE: 29
PIF_VALUE: 21
PIF_VALUE: 31
PIF_VALUE: 31
PIF_VALUE: 21
PIF_VALUE: 31
PIF_VALUE: 31
PIF_VALUE: 32
PIF_VALUE: 36
PIF_VALUE: 5
PIF_VALUE: 31
PIF_VALUE: 31
PIF_VALUE: 32
PIF_VALUE: 0
PIF_VALUE: 1
PIF_VALUE: 0
PIF_VALUE: 31
PIF_VALUE: 21
PIF_VALUE: 23
PIF_VALUE: 32
PIF_VALUE: 28
PIF_VALUE: 31
PIF_VALUE: 31
PIF_VALUE: 32
PIF_VALUE: 31
PIF_VALUE: 27
PIF_VALUE: 34
PIF_VALUE: 32
PIF_VALUE: 31
PIF_VALUE: 31
PIF_VALUE: 27
PIF_VALUE: 33
PIF_VALUE: 0
PIF_VALUE: 34
PIF_VALUE: 31
PIF_VALUE: 27

## 2020-01-04 ASSESSMENT — PAIN DESCRIPTION - ORIENTATION
ORIENTATION: RIGHT
ORIENTATION: RIGHT;LEFT

## 2020-01-04 ASSESSMENT — PAIN DESCRIPTION - PAIN TYPE
TYPE: SURGICAL PAIN
TYPE: SURGICAL PAIN

## 2020-01-04 ASSESSMENT — PAIN DESCRIPTION - FREQUENCY
FREQUENCY: CONTINUOUS
FREQUENCY: CONTINUOUS

## 2020-01-04 ASSESSMENT — PAIN DESCRIPTION - LOCATION
LOCATION: ABDOMEN
LOCATION: ABDOMEN

## 2020-01-04 ASSESSMENT — PAIN SCALES - GENERAL
PAINLEVEL_OUTOF10: 0
PAINLEVEL_OUTOF10: 5
PAINLEVEL_OUTOF10: 0
PAINLEVEL_OUTOF10: 5
PAINLEVEL_OUTOF10: 3

## 2020-01-04 ASSESSMENT — PAIN DESCRIPTION - DESCRIPTORS
DESCRIPTORS: TENDER
DESCRIPTORS: TENDER

## 2020-01-04 ASSESSMENT — PAIN DESCRIPTION - ONSET
ONSET: ON-GOING
ONSET: ON-GOING

## 2020-01-04 ASSESSMENT — PAIN - FUNCTIONAL ASSESSMENT: PAIN_FUNCTIONAL_ASSESSMENT: PREVENTS OR INTERFERES SOME ACTIVE ACTIVITIES AND ADLS

## 2020-01-04 NOTE — PROGRESS NOTES
Pt requesting to keeps her jewelry on during her surgical procedure explained the risks and benefits she verbalized understanding, and signed the refusal to remove jewelry form. She will be keeping her earrings and rings on during the procedure. Form in soft chart.

## 2020-01-04 NOTE — ANESTHESIA POSTPROCEDURE EVALUATION
Department of Anesthesiology  Postprocedure Note    Patient: Kentrell Cannon  MRN: [id number]  YOB: 1970  Date of evaluation: 1/4/2020  Time:  1:13 PM     Procedure Summary     Date:  01/04/20 Room / Location:  [address] [place] [address] 644 / [facility]    Anesthesia Start:  1113 Anesthesia Stop:  1225    Procedures:       ERCP SPHINCTER/PAPILLOTOMY (N/A )      ERCP DILATION BALLOON      ERCP STONE REMOVAL      ERCP STENT INSERTION Diagnosis:  (CHOLEDOCHOLITHIASIS, OBSTRUCTIVE JAUNDICE)    Surgeon:  Zhao Olivares MD Responsible Provider:  Alicia Main MD    Anesthesia Type:  MAC ASA Status:  3          Anesthesia Type: MAC    Woody Phase I: Woody Score: 9    Woody Phase II:      Last vitals: Reviewed and per EMR flowsheets.        Anesthesia Post Evaluation    Patient location during evaluation: PACU  Patient participation: complete - patient participated  Level of consciousness: awake and alert  Pain score: 0  Airway patency: patent  Nausea & Vomiting: no vomiting and no nausea  Complications: no  Cardiovascular status: hemodynamically stable  Respiratory status: acceptable  Hydration status: stable

## 2020-01-04 NOTE — PLAN OF CARE
Problem: Pain:  Goal: Pain level will decrease  Description  Pain level will decrease  1/4/2020 1636 by Zaida Beck RN  Outcome: Met This Shift     Problem: Pain:  Goal: Control of chronic pain  Description  Control of chronic pain  1/4/2020 1029 by Zaida Beck RN  Outcome: Met This Shift     Problem: Bowel/Gastric:  Goal: Bowel function will improve  Description  Bowel function will improve  1/4/2020 1636 by Zaida Beck RN  Outcome: Met This Shift     Problem: Bowel/Gastric:  Goal: Diagnostic test results will improve  Description  Diagnostic test results will improve  Outcome: Met This Shift     Problem:  Bowel/Gastric:  Goal: Occurrences of vomiting will decrease  Description  Occurrences of vomiting will decrease  Outcome: Met This Shift

## 2020-01-04 NOTE — PROGRESS NOTES
Subjective:  Devin Hoyos was seen and examined at bedside today. The patient's questions were answered and tests were reviewed. There were no new problems reported overnight. Patient is tolerating current diet s/p ERCP/removal of choledocholithiasis/sphincteroplasty    A complete review of systems and social history was completed on admission and remains unchanged unless otherwise noted    Scheduled Meds:   meperidine        FLUoxetine  20 mg Oral Daily    metoprolol succinate  50 mg Oral BID    tiZANidine  4 mg Oral Nightly    triamterene-hydrochlorothiazide  2 tablet Oral Daily    pantoprazole  40 mg Intravenous Daily    And    sodium chloride (PF)  10 mL Intravenous Daily    insulin lispro  0-12 Units Subcutaneous TID WC    insulin lispro  0-6 Units Subcutaneous Nightly    insulin glargine  50 Units Subcutaneous Nightly    enoxaparin  40 mg Subcutaneous Daily     Continuous Infusions:   sodium chloride 125 mL/hr at 01/04/20 1513    dextrose       PRN Meds:traMADol, fluticasone, ondansetron, promethazine, glucose, dextrose, glucagon (rDNA), dextrose    Objective:  /79   Pulse 64   Temp 98.7 °F (37.1 °C) (Oral)   Resp 16   Ht 5' 4\" (1.626 m)   Wt 230 lb (104.3 kg)   SpO2 96%   BMI 39.48 kg/m²   In: 921 [P.O.:5; I.V.:916]  Out: 800    In: 921   Out: 800 [Urine:800]     AAO x 3, currently in NAD  RRR, pos S1, S2  CTA bilaterally, no wheeze, rales or rhonchi  bowel sounds present, mild ruq tenderness present  No clubbing, cyanosis, or edema  No neuro changes   No obvious rashes or lesions.     Recent Labs     01/02/20 0150 01/03/20 0439   WBC 12.4* 8.2   HGB 15.1 11.4*    223     Recent Labs     01/02/20 0150 01/02/20 0158 01/02/20 0434 01/03/20 0439     --   --  135   K 4.3  --   --  3.8   CL 90*  --   --  96*   CO2 25 27  --  27   BUN 11  --   --  9   CREATININE 0.8 0.8  --  0.9   GLUCOSE 446*  --  372 278*     Recent Labs     01/02/20 0150 01/03/20  0439   BILITOT 1.2 4.6* ALKPHOS 265* 256*   * 442*   ALT 83* 309*     Recent Labs     01/02/20  0747   INR 1.0     Recent Labs     01/02/20  0150 01/02/20  1042   CKTOTAL  --  218*   TROPONINI <0.01  --          Ct Abdomen Pelvis W Iv Contrast Additional Contrast? None    Result Date: 1/2/2020  READING LOCATION: [facility] HISTORY: Abdominal pain. TECHNIQUE: Serial axial images of the abdomen and pelvis were obtained following the uneventful administration of 80 cc of Isovue 370 intravenous contrast. Reformatted sagittal and coronal images were obtained. Automated dose exposure control was utilized for this examination. FINDINGS: The lung bases are clear. There are findings of hepatic steatosis more prominent within the central aspect of the right and left hepatic lobes. There are findings of minimal pneumobilia consistent with previous instrumentation. The spleen, pancreas and adrenal glands are unremarkable. There is no right or left renal calculus or hydronephrosis. There is no calculus seen within the course of the ureters. There is no calculus within the urinary bladder. Note is made of a simple left renal cyst measuring 1.8 cm. No solid renal mass is noted. The gallbladder is surgically absent. The stomach is normally distended. There is no large or small bowel obstruction. There are a few scattered sigmoid diverticula. There is no evidence of diverticulitis. The appendix is normal in caliber. The abdominal aorta is normal in caliber. There is no retroperitoneal lymphadenopathy. Bone windows of the lumbar spine are negative for any compression fracture or osseous lesion. 1. Findings of chronic hepatic steatosis more prominent within the central aspect of the right and left hepatic lobes. 2. Minimal pneumobilia which is also chronic and consistent with previous instrumentation. 3. There are no findings of obstructive uropathy. 4. Tiny 1 cm periumbilical hernia containing mesenteric fat only.     Us Abdomen Limited    Result Date: 1/2/2020  Reading location:  200 HISTORY: Elevated LFTs Real-time ultrasound examination the right upper quadrant was performed. Correlation is made with patient's previous CT scan obtained earlier today. The gallbladder is surgically absent. The common bile duct is mildly prominent measures 1 cm. There is a noncalcified stone seen within the common bile duct measuring approximately 3 to 4 mm. This stone is not identified on the earlier CT scan. There is diffuse fatty infiltration of liver. Pancreatic head is unremarkable. 1. Choledocholithiasis. The nonshadowing stone measures approximately 3 to 4 mm. MRCP or ERCP is recommended for further evaluation. 2. Hepatic steatosis. . THIS IS AN ABNORMAL REPORT. PLEASE TAKE APPROPRIATE MEASURES. Assessment:  Sheryl Burr is a 48y.o. year old female who presented on 1/2/2020 and is being treated for:  Principal Problem:    Abdominal pain  Active Problems:    Elevated LFTs    Hyperlipidemia    Allergic rhinitis due to allergen    MTHFR mutation ([address]    Pneumobilia    Obstructive jaundice    Diabetes mellitus ([address]  Resolved Problems:    * No resolved hospital problems. *    Plan  · Cont ivf until po intake improves  · Possible d/c home tomorrow  · Please see orders for further management and care.     Berenice Espinoza MD  4:16 PM  1/4/2020

## 2020-01-05 ENCOUNTER — APPOINTMENT (OUTPATIENT)
Dept: GENERAL RADIOLOGY | Age: 50
DRG: 446 | End: 2020-01-05
Payer: COMMERCIAL

## 2020-01-05 ENCOUNTER — APPOINTMENT (OUTPATIENT)
Dept: ULTRASOUND IMAGING | Age: 50
DRG: 446 | End: 2020-01-05
Payer: COMMERCIAL

## 2020-01-05 LAB
ALBUMIN SERPL-MCNC: 2.9 G/DL (ref 3.5–5.2)
ALP BLD-CCNC: 240 U/L (ref 35–104)
ALT SERPL-CCNC: 127 U/L (ref 0–32)
ANION GAP SERPL CALCULATED.3IONS-SCNC: 13 MMOL/L (ref 7–16)
ANION GAP SERPL CALCULATED.3IONS-SCNC: 14 MMOL/L (ref 7–16)
AST SERPL-CCNC: 35 U/L (ref 0–31)
BILIRUB SERPL-MCNC: 0.7 MG/DL (ref 0–1.2)
BUN BLDV-MCNC: 17 MG/DL (ref 6–20)
BUN BLDV-MCNC: 19 MG/DL (ref 6–20)
CALCIUM SERPL-MCNC: 8.5 MG/DL (ref 8.6–10.2)
CALCIUM SERPL-MCNC: 9.4 MG/DL (ref 8.6–10.2)
CHLORIDE BLD-SCNC: 95 MMOL/L (ref 98–107)
CHLORIDE BLD-SCNC: 99 MMOL/L (ref 98–107)
CO2: 18 MMOL/L (ref 22–29)
CO2: 23 MMOL/L (ref 22–29)
CREAT SERPL-MCNC: 0.9 MG/DL (ref 0.5–1)
CREAT SERPL-MCNC: 0.9 MG/DL (ref 0.5–1)
GFR AFRICAN AMERICAN: >60
GFR AFRICAN AMERICAN: >60
GFR NON-AFRICAN AMERICAN: >60 ML/MIN/1.73
GFR NON-AFRICAN AMERICAN: >60 ML/MIN/1.73
GLUCOSE BLD-MCNC: 135 MG/DL (ref 74–99)
GLUCOSE BLD-MCNC: 537 MG/DL (ref 74–99)
HCT VFR BLD CALC: 34.5 % (ref 34–48)
HEMOGLOBIN: 11.4 G/DL (ref 11.5–15.5)
MCH RBC QN AUTO: 28.1 PG (ref 26–35)
MCHC RBC AUTO-ENTMCNC: 33 % (ref 32–34.5)
MCV RBC AUTO: 85 FL (ref 80–99.9)
METER GLUCOSE: 146 MG/DL (ref 74–99)
METER GLUCOSE: 207 MG/DL (ref 74–99)
METER GLUCOSE: 233 MG/DL (ref 74–99)
METER GLUCOSE: 336 MG/DL (ref 74–99)
METER GLUCOSE: 435 MG/DL (ref 74–99)
METER GLUCOSE: 460 MG/DL (ref 74–99)
PDW BLD-RTO: 12 FL (ref 11.5–15)
PLATELET # BLD: 221 E9/L (ref 130–450)
PMV BLD AUTO: 11.1 FL (ref 7–12)
POTASSIUM SERPL-SCNC: 4.2 MMOL/L (ref 3.5–5)
POTASSIUM SERPL-SCNC: 4.7 MMOL/L (ref 3.5–5)
RBC # BLD: 4.06 E12/L (ref 3.5–5.5)
SODIUM BLD-SCNC: 126 MMOL/L (ref 132–146)
SODIUM BLD-SCNC: 136 MMOL/L (ref 132–146)
TOTAL PROTEIN: 6.5 G/DL (ref 6.4–8.3)
WBC # BLD: 9.3 E9/L (ref 4.5–11.5)

## 2020-01-05 PROCEDURE — 6360000002 HC RX W HCPCS: Performed by: INTERNAL MEDICINE

## 2020-01-05 PROCEDURE — 36415 COLL VENOUS BLD VENIPUNCTURE: CPT

## 2020-01-05 PROCEDURE — 80053 COMPREHEN METABOLIC PANEL: CPT

## 2020-01-05 PROCEDURE — 80048 BASIC METABOLIC PNL TOTAL CA: CPT

## 2020-01-05 PROCEDURE — 74018 RADEX ABDOMEN 1 VIEW: CPT

## 2020-01-05 PROCEDURE — 2580000003 HC RX 258: Performed by: INTERNAL MEDICINE

## 2020-01-05 PROCEDURE — 85027 COMPLETE CBC AUTOMATED: CPT

## 2020-01-05 PROCEDURE — 1200000000 HC SEMI PRIVATE

## 2020-01-05 PROCEDURE — 76705 ECHO EXAM OF ABDOMEN: CPT

## 2020-01-05 PROCEDURE — 6370000000 HC RX 637 (ALT 250 FOR IP): Performed by: INTERNAL MEDICINE

## 2020-01-05 PROCEDURE — 82962 GLUCOSE BLOOD TEST: CPT

## 2020-01-05 PROCEDURE — C9113 INJ PANTOPRAZOLE SODIUM, VIA: HCPCS | Performed by: INTERNAL MEDICINE

## 2020-01-05 RX ORDER — SIMETHICONE 80 MG
80 TABLET,CHEWABLE ORAL EVERY 6 HOURS PRN
Status: DISCONTINUED | OUTPATIENT
Start: 2020-01-05 | End: 2020-01-06 | Stop reason: HOSPADM

## 2020-01-05 RX ORDER — INSULIN GLARGINE 100 [IU]/ML
20 INJECTION, SOLUTION SUBCUTANEOUS ONCE
Status: COMPLETED | OUTPATIENT
Start: 2020-01-05 | End: 2020-01-05

## 2020-01-05 RX ORDER — INSULIN GLARGINE 100 [IU]/ML
60 INJECTION, SOLUTION SUBCUTANEOUS NIGHTLY
Status: DISCONTINUED | OUTPATIENT
Start: 2020-01-05 | End: 2020-01-06 | Stop reason: HOSPADM

## 2020-01-05 RX ADMIN — HYDROCODONE BITARTRATE AND ACETAMINOPHEN 1 TABLET: 7.5; 325 TABLET ORAL at 09:26

## 2020-01-05 RX ADMIN — PANTOPRAZOLE SODIUM 40 MG: 40 INJECTION, POWDER, FOR SOLUTION INTRAVENOUS at 09:26

## 2020-01-05 RX ADMIN — HYDROCODONE BITARTRATE AND ACETAMINOPHEN 1 TABLET: 7.5; 325 TABLET ORAL at 00:23

## 2020-01-05 RX ADMIN — TIZANIDINE 4 MG: 4 TABLET ORAL at 20:48

## 2020-01-05 RX ADMIN — ONDANSETRON 8 MG: 2 INJECTION INTRAMUSCULAR; INTRAVENOUS at 03:23

## 2020-01-05 RX ADMIN — INSULIN LISPRO 8 UNITS: 100 INJECTION, SOLUTION INTRAVENOUS; SUBCUTANEOUS at 09:31

## 2020-01-05 RX ADMIN — INSULIN LISPRO 4 UNITS: 100 INJECTION, SOLUTION INTRAVENOUS; SUBCUTANEOUS at 12:10

## 2020-01-05 RX ADMIN — SIMETHICONE CHEW TAB 80 MG 80 MG: 80 TABLET ORAL at 23:07

## 2020-01-05 RX ADMIN — INSULIN LISPRO 15 UNITS: 100 INJECTION, SOLUTION INTRAVENOUS; SUBCUTANEOUS at 07:16

## 2020-01-05 RX ADMIN — SODIUM CHLORIDE, PRESERVATIVE FREE 10 ML: 5 INJECTION INTRAVENOUS at 09:27

## 2020-01-05 RX ADMIN — INSULIN GLARGINE 20 UNITS: 100 INJECTION, SOLUTION SUBCUTANEOUS at 07:16

## 2020-01-05 RX ADMIN — ENOXAPARIN SODIUM 40 MG: 40 INJECTION SUBCUTANEOUS at 20:48

## 2020-01-05 RX ADMIN — INSULIN GLARGINE 60 UNITS: 100 INJECTION, SOLUTION SUBCUTANEOUS at 20:48

## 2020-01-05 RX ADMIN — METOPROLOL SUCCINATE 50 MG: 50 TABLET, EXTENDED RELEASE ORAL at 20:48

## 2020-01-05 RX ADMIN — METOPROLOL SUCCINATE 50 MG: 50 TABLET, EXTENDED RELEASE ORAL at 12:43

## 2020-01-05 RX ADMIN — SODIUM CHLORIDE: 9 INJECTION, SOLUTION INTRAVENOUS at 16:15

## 2020-01-05 RX ADMIN — FLUOXETINE 20 MG: 20 CAPSULE ORAL at 09:26

## 2020-01-05 RX ADMIN — TRIAMTERENE AND HYDROCHLOROTHIAZIDE 2 TABLET: 37.5; 25 TABLET ORAL at 09:26

## 2020-01-05 RX ADMIN — INSULIN LISPRO 2 UNITS: 100 INJECTION, SOLUTION INTRAVENOUS; SUBCUTANEOUS at 20:48

## 2020-01-05 RX ADMIN — INSULIN LISPRO 2 UNITS: 100 INJECTION, SOLUTION INTRAVENOUS; SUBCUTANEOUS at 17:04

## 2020-01-05 RX ADMIN — SIMETHICONE CHEW TAB 80 MG 80 MG: 80 TABLET ORAL at 17:03

## 2020-01-05 ASSESSMENT — PAIN DESCRIPTION - ONSET
ONSET: GRADUAL
ONSET: ON-GOING
ONSET: ON-GOING

## 2020-01-05 ASSESSMENT — PAIN DESCRIPTION - DESCRIPTORS
DESCRIPTORS: BURNING;DISCOMFORT;OTHER (COMMENT)
DESCRIPTORS: ACHING;OTHER (COMMENT);SORE
DESCRIPTORS: ACHING;CONSTANT;DISCOMFORT

## 2020-01-05 ASSESSMENT — PAIN DESCRIPTION - PROGRESSION
CLINICAL_PROGRESSION: GRADUALLY WORSENING
CLINICAL_PROGRESSION: OTHER (COMMENT)
CLINICAL_PROGRESSION: GRADUALLY IMPROVING

## 2020-01-05 ASSESSMENT — PAIN DESCRIPTION - LOCATION
LOCATION: ABDOMEN

## 2020-01-05 ASSESSMENT — PAIN DESCRIPTION - PAIN TYPE
TYPE: ACUTE PAIN;SURGICAL PAIN

## 2020-01-05 ASSESSMENT — PAIN SCALES - GENERAL
PAINLEVEL_OUTOF10: 7
PAINLEVEL_OUTOF10: 8
PAINLEVEL_OUTOF10: 0
PAINLEVEL_OUTOF10: 3
PAINLEVEL_OUTOF10: 7
PAINLEVEL_OUTOF10: 3
PAINLEVEL_OUTOF10: 4

## 2020-01-05 ASSESSMENT — PAIN DESCRIPTION - FREQUENCY
FREQUENCY: CONTINUOUS

## 2020-01-05 ASSESSMENT — PAIN - FUNCTIONAL ASSESSMENT
PAIN_FUNCTIONAL_ASSESSMENT: PREVENTS OR INTERFERES SOME ACTIVE ACTIVITIES AND ADLS
PAIN_FUNCTIONAL_ASSESSMENT: PREVENTS OR INTERFERES SOME ACTIVE ACTIVITIES AND ADLS

## 2020-01-05 ASSESSMENT — PAIN DESCRIPTION - ORIENTATION
ORIENTATION: RIGHT;UPPER

## 2020-01-05 NOTE — PLAN OF CARE
Problem: Pain:  Goal: Pain level will decrease  Description  Pain level will decrease  1/4/2020 2203 by Marlin Stewart RN  Outcome: Met This Shift     Problem: Pain:  Goal: Control of acute pain  Description  Control of acute pain  1/4/2020 2203 by Marlin Stewart RN  Outcome: Met This Shift

## 2020-01-05 NOTE — PROGRESS NOTES
309* 127*     No results for input(s): INR in the last 72 hours. Invalid input(s): PT  No results for input(s): CKTOTAL, CKMB, CKMBINDEX, TROPONINI in the last 72 hours. Ct Abdomen Pelvis W Iv Contrast Additional Contrast? None    Result Date: 1/2/2020  READING LOCATION: [facility] HISTORY: Abdominal pain. TECHNIQUE: Serial axial images of the abdomen and pelvis were obtained following the uneventful administration of 80 cc of Isovue 370 intravenous contrast. Reformatted sagittal and coronal images were obtained. Automated dose exposure control was utilized for this examination. FINDINGS: The lung bases are clear. There are findings of hepatic steatosis more prominent within the central aspect of the right and left hepatic lobes. There are findings of minimal pneumobilia consistent with previous instrumentation. The spleen, pancreas and adrenal glands are unremarkable. There is no right or left renal calculus or hydronephrosis. There is no calculus seen within the course of the ureters. There is no calculus within the urinary bladder. Note is made of a simple left renal cyst measuring 1.8 cm. No solid renal mass is noted. The gallbladder is surgically absent. The stomach is normally distended. There is no large or small bowel obstruction. There are a few scattered sigmoid diverticula. There is no evidence of diverticulitis. The appendix is normal in caliber. The abdominal aorta is normal in caliber. There is no retroperitoneal lymphadenopathy. Bone windows of the lumbar spine are negative for any compression fracture or osseous lesion. 1. Findings of chronic hepatic steatosis more prominent within the central aspect of the right and left hepatic lobes. 2. Minimal pneumobilia which is also chronic and consistent with previous instrumentation. 3. There are no findings of obstructive uropathy. 4. Tiny 1 cm periumbilical hernia containing mesenteric fat only.     Us Abdomen Limited    Result Date: 1/2/2020  Reading location:  [facility] HISTORY: Elevated LFTs Real-time ultrasound examination the right upper quadrant was performed. Correlation is made with patient's previous CT scan obtained earlier today. The gallbladder is surgically absent. The common bile duct is mildly prominent measures 1 cm. There is a noncalcified stone seen within the common bile duct measuring approximately 3 to 4 mm. This stone is not identified on the earlier CT scan. There is diffuse fatty infiltration of liver. Pancreatic head is unremarkable. 1. Choledocholithiasis. The nonshadowing stone measures approximately 3 to 4 mm. MRCP or ERCP is recommended for further evaluation. 2. Hepatic steatosis. . THIS IS AN ABNORMAL REPORT. PLEASE TAKE APPROPRIATE MEASURES. Assessment:  Bia Velazquez is a 48y.o. year old female who presented on 1/2/2020 and is being treated for:  Principal Problem:    Abdominal pain  Active Problems:    Elevated LFTs    Hyperlipidemia    Allergic rhinitis due to allergen    MTHFR mutation ([facility] [address]    Pneumobilia    Obstructive jaundice    Diabetes mellitus ([facility] [address]  Resolved Problems:    * No resolved hospital problems. *    Plan  · Hold d/c today  · Increase insulin  · Otherwise continue current therapy. · Please see orders for further management and care.     Kaylin Pena MD  4:37 PM  1/5/2020

## 2020-01-05 NOTE — PROGRESS NOTES
PROGRESS NOTE  By Leigh Bosworth, M.D. [facility]  Dr. Calixto Foster M.D.,  Dr. Susannah Mosley M.D.,   JI Frank.O.,  Dr. Tuyet Blancas M.D.,  Dr Serena Coker D.O. 5 [facility]  48 y.o.  female    SUBJECTIVE:  Denies fever or chills. Complains of right flank pain. Denies nausea vomiting    OBJECTIVE:    BP (!) 153/85   Pulse 72   Temp 97.9 °F (36.6 °C) (Oral)   Resp 16   Ht 5' 4\" (1.626 m)   Wt 230 lb (104.3 kg)   SpO2 96%   BMI 39.48 kg/m²     General: NAD/ female  HEENT: Anicteric sclera/moist oral mucosa  Neck: Supple with trachea midline  Chest: Symmetrical excursion/nonlabored respirations  Abd.: Soft and obese. Mildly tender in the right upper quadrant with tenderness mostly flank  Extr.:  No peripheral edema  Skin: Warm and dry    DATA:    Stool (measured) : 0 mL  Lab Results   Component Value Date    WBC 9.3 01/05/2020    RBC 4.06 01/05/2020    HGB 11.4 01/05/2020    HCT 34.5 01/05/2020    MCV 85.0 01/05/2020    MCH 28.1 01/05/2020    MCHC 33.0 01/05/2020    RDW 12.0 01/05/2020     01/05/2020    MPV 11.1 01/05/2020     Lab Results   Component Value Date     01/05/2020    K 4.7 01/05/2020    K 4.3 01/02/2020    CL 95 01/05/2020    CO2 18 01/05/2020    BUN 19 01/05/2020    CREATININE 0.9 01/05/2020    CALCIUM 8.5 01/05/2020    PROT 6.5 01/05/2020    LABALBU 2.9 01/05/2020    LABALBU 4.0 10/27/2011    BILITOT 0.7 01/05/2020    ALKPHOS 240 01/05/2020    AST 35 01/05/2020     01/05/2020     Lab Results   Component Value Date    LIPASE 58 01/02/2020     Lab Results   Component Value Date    AMYLASE 18 01/18/2019         ASSESSMENT/PLAN:  1. Abdominal pain/elevated liver chemistries (mixed)/recurrent choledocolithiasis   -Multiple prior ERCP  -S/P ERCP 1/4 -placement of stent -please refer to pertinent procedure note  -Significantly improved transaminases and normalized bilirubin  -Right flank pain after procedure -obtain imaging       2. Hx of PUD  -Daily PPI    NOTE:  This report was transcribed using voice recognition software. Every effort was made to ensure accuracy; however, inadvertent computerized transcription errors may be present.     Shahida Hernandez MD  1/5/2020  2:01 PM

## 2020-01-06 VITALS
DIASTOLIC BLOOD PRESSURE: 84 MMHG | TEMPERATURE: 98.3 F | WEIGHT: 230 LBS | BODY MASS INDEX: 39.27 KG/M2 | OXYGEN SATURATION: 98 % | HEART RATE: 65 BPM | SYSTOLIC BLOOD PRESSURE: 139 MMHG | HEIGHT: 64 IN | RESPIRATION RATE: 16 BRPM

## 2020-01-06 LAB
METER GLUCOSE: 161 MG/DL (ref 74–99)
METER GLUCOSE: 171 MG/DL (ref 74–99)

## 2020-01-06 PROCEDURE — C9113 INJ PANTOPRAZOLE SODIUM, VIA: HCPCS | Performed by: INTERNAL MEDICINE

## 2020-01-06 PROCEDURE — 2580000003 HC RX 258: Performed by: INTERNAL MEDICINE

## 2020-01-06 PROCEDURE — 6370000000 HC RX 637 (ALT 250 FOR IP): Performed by: INTERNAL MEDICINE

## 2020-01-06 PROCEDURE — 82962 GLUCOSE BLOOD TEST: CPT

## 2020-01-06 PROCEDURE — 6360000002 HC RX W HCPCS: Performed by: INTERNAL MEDICINE

## 2020-01-06 RX ORDER — URSODIOL 300 MG/1
300 CAPSULE ORAL 2 TIMES DAILY
Qty: 60 CAPSULE | Refills: 0 | Status: ON HOLD | OUTPATIENT
Start: 2020-01-06 | End: 2020-03-17 | Stop reason: HOSPADM

## 2020-01-06 RX ORDER — URSODIOL 300 MG/1
300 CAPSULE ORAL 2 TIMES DAILY
Status: DISCONTINUED | OUTPATIENT
Start: 2020-01-06 | End: 2020-01-06 | Stop reason: HOSPADM

## 2020-01-06 RX ADMIN — INSULIN LISPRO 2 UNITS: 100 INJECTION, SOLUTION INTRAVENOUS; SUBCUTANEOUS at 08:25

## 2020-01-06 RX ADMIN — SODIUM CHLORIDE, PRESERVATIVE FREE 10 ML: 5 INJECTION INTRAVENOUS at 08:25

## 2020-01-06 RX ADMIN — INSULIN LISPRO 2 UNITS: 100 INJECTION, SOLUTION INTRAVENOUS; SUBCUTANEOUS at 11:27

## 2020-01-06 RX ADMIN — PANTOPRAZOLE SODIUM 40 MG: 40 INJECTION, POWDER, FOR SOLUTION INTRAVENOUS at 08:25

## 2020-01-06 RX ADMIN — TRIAMTERENE AND HYDROCHLOROTHIAZIDE 2 TABLET: 37.5; 25 TABLET ORAL at 08:25

## 2020-01-06 RX ADMIN — SIMETHICONE CHEW TAB 80 MG 80 MG: 80 TABLET ORAL at 08:25

## 2020-01-06 RX ADMIN — FLUOXETINE 20 MG: 20 CAPSULE ORAL at 08:24

## 2020-01-06 RX ADMIN — METOPROLOL SUCCINATE 50 MG: 50 TABLET, EXTENDED RELEASE ORAL at 08:24

## 2020-01-06 NOTE — DISCHARGE SUMMARY
Discharge Summary    Neville Root  :  1970  MRN:  [id number]    Admit date:  2020  Discharge date:  2020    Admitting Physician:    Atilio Gilliam MD    Discharge Diagnoses:    Principal Problem:    Abdominal pain  Active Problems:    Elevated LFTs    Hyperlipidemia    Allergic rhinitis due to allergen    MTHFR mutation ([facility] [address]    Pneumobilia    Obstructive jaundice    Diabetes mellitus ([facility] [address]  Resolved Problems:    * No resolved hospital problems. *    Consults:   IP CONSULT TO INTERNAL MEDICINE  IP CONSULT TO GI     Condition at Discharge:  Stable    HPI/Hospital Course: 48 y.o. female with history of multiple medical problems including coagulation defect and biliary complications after cholecystectomy who presented to [facility] emergency room from home after developing right-sided flank pain on the day of admission. Patient states she has had similar pain in the past consistent with her sphincter of Oddi dysfunction requiring ERCPs. Her symptoms were associated with nausea vomiting but no other complaints of fevers, chills, chest pain or shortness of breath. Pt underwent another ERCP by GI with resolution of her symptoms. She was able to tolerate a diet and her labs improved. Today on day of discharge pt feels better with no further complaints. Vitals and Labs are stable. All consultants involved during this admission are agreeable to d/c.     Physical Exam  /84   Pulse 65   Temp 98.3 °F (36.8 °C) (Oral)   Resp 16   Ht 5' 4\" (1.626 m)   Wt 230 lb (104.3 kg)   SpO2 98%   BMI 39.48 kg/m²   RRR   CTA bilaterally, no wheeze, rales or rhonchi   bowel sounds present, nontender, nondistended   No clubbing, cyanosis, or edema   Neuro - at baseline     Pertinent Results during this Hospital Course:  Xr Abdomen (kub) (single Ap View)    Result Date: 2020  LOCATION: 200 EXAM: XR ABDOMEN (KUB) (SINGLE AP VIEW) COMPARISON: None HISTORY: Upper abdominal pain TECHNIQUE: Supine view  of the Pancreatic S&i    Result Date: 1/4/2020  LOCATION: 200 EXAM: FL ERCP BILIARY AND PANCREATIC S&I COMPARISON: None HISTORY: Choledocholithiasis TOTAL FLUOROSCOPY TIME: 237.8 seconds DAP: 116.1 mGy. TECHNIQUE: 6 images were obtained intraoperatively. FINDINGS: Images from an ERCP show multiple balloon sweeps of the common bile duct. Intraoperative images as above. Us Abdomen Limited Specify Organ? Liver    Result Date: 1/5/2020  Location:200 Exam: US ABDOMEN LIMITED Comparison: None History:  Abdominal pain status post ERCP Technique: Real-time, gray scale, color flow images of the right upper quadrant was obtained. Findings: The liver is normal  in echogenicity. No intrahepatic biliary ductal dilation seen. The majority of the pancreas is obscured by bowel gas. The visualized portions of the pancreas are unremarkable. Gallbladder is surgically absent The common bile duct measures 5.0 mm in greatest dimension. Screening examination of the right kidney is within normal limits. 1. No significant biliary ductal dilatation. Us Abdomen Limited    Result Date: 1/2/2020  Reading location:  200 HISTORY: Elevated LFTs Real-time ultrasound examination the right upper quadrant was performed. Correlation is made with patient's previous CT scan obtained earlier today. The gallbladder is surgically absent. The common bile duct is mildly prominent measures 1 cm. There is a noncalcified stone seen within the common bile duct measuring approximately 3 to 4 mm. This stone is not identified on the earlier CT scan. There is diffuse fatty infiltration of liver. Pancreatic head is unremarkable. 1. Choledocholithiasis. The nonshadowing stone measures approximately 3 to 4 mm. MRCP or ERCP is recommended for further evaluation. 2. Hepatic steatosis. . THIS IS AN ABNORMAL REPORT. PLEASE TAKE APPROPRIATE MEASURES.      Lab Results   Component Value Date    WBC 9.3 01/05/2020    HGB 11.4 01/05/2020    HCT 34.5 01/05/2020    MCV

## 2020-02-24 ENCOUNTER — ANESTHESIA EVENT (OUTPATIENT)
Dept: OPERATING ROOM | Age: 50
End: 2020-02-24
Payer: COMMERCIAL

## 2020-02-24 ENCOUNTER — APPOINTMENT (OUTPATIENT)
Dept: GENERAL RADIOLOGY | Age: 50
End: 2020-02-24
Attending: SPECIALIST
Payer: COMMERCIAL

## 2020-02-24 ENCOUNTER — ANESTHESIA (OUTPATIENT)
Dept: OPERATING ROOM | Age: 50
End: 2020-02-24
Payer: COMMERCIAL

## 2020-02-24 ENCOUNTER — HOSPITAL ENCOUNTER (OUTPATIENT)
Age: 50
Setting detail: OUTPATIENT SURGERY
Discharge: HOME | End: 2020-02-24
Attending: SPECIALIST | Admitting: SPECIALIST
Payer: COMMERCIAL

## 2020-02-24 VITALS
DIASTOLIC BLOOD PRESSURE: 91 MMHG | TEMPERATURE: 97.4 F | RESPIRATION RATE: 16 BRPM | WEIGHT: 221 LBS | HEIGHT: 63 IN | SYSTOLIC BLOOD PRESSURE: 154 MMHG | OXYGEN SATURATION: 95 % | HEART RATE: 63 BPM | BODY MASS INDEX: 39.16 KG/M2

## 2020-02-24 VITALS
RESPIRATION RATE: 2 BRPM | OXYGEN SATURATION: 99 % | SYSTOLIC BLOOD PRESSURE: 176 MMHG | DIASTOLIC BLOOD PRESSURE: 120 MMHG

## 2020-02-24 LAB
ANION GAP SERPL CALCULATED.3IONS-SCNC: 15 MMOL/L (ref 7–16)
APTT: 30.7 SEC (ref 24.5–35.1)
BUN BLDV-MCNC: 11 MG/DL (ref 6–20)
CALCIUM SERPL-MCNC: 10 MG/DL (ref 8.6–10.2)
CHLORIDE BLD-SCNC: 97 MMOL/L (ref 98–107)
CO2: 26 MMOL/L (ref 22–29)
CREAT SERPL-MCNC: 0.8 MG/DL (ref 0.5–1)
GFR AFRICAN AMERICAN: >60
GFR NON-AFRICAN AMERICAN: >60 ML/MIN/1.73
GLUCOSE BLD-MCNC: 142 MG/DL (ref 74–99)
HCT VFR BLD CALC: 42 % (ref 34–48)
HEMOGLOBIN: 14.6 G/DL (ref 11.5–15.5)
INR BLD: 0.9
MCH RBC QN AUTO: 28.6 PG (ref 26–35)
MCHC RBC AUTO-ENTMCNC: 34.8 % (ref 32–34.5)
MCV RBC AUTO: 82.2 FL (ref 80–99.9)
METER GLUCOSE: 140 MG/DL (ref 74–99)
PDW BLD-RTO: 12.6 FL (ref 11.5–15)
PLATELET # BLD: 365 E9/L (ref 130–450)
PMV BLD AUTO: 9.8 FL (ref 7–12)
POTASSIUM SERPL-SCNC: 4.3 MMOL/L (ref 3.5–5)
PROTHROMBIN TIME: 10.7 SEC (ref 9.3–12.4)
RBC # BLD: 5.11 E12/L (ref 3.5–5.5)
SODIUM BLD-SCNC: 138 MMOL/L (ref 132–146)
WBC # BLD: 7.9 E9/L (ref 4.5–11.5)

## 2020-02-24 PROCEDURE — 85610 PROTHROMBIN TIME: CPT

## 2020-02-24 PROCEDURE — 85730 THROMBOPLASTIN TIME PARTIAL: CPT

## 2020-02-24 PROCEDURE — 6370000000 HC RX 637 (ALT 250 FOR IP): Performed by: STUDENT IN AN ORGANIZED HEALTH CARE EDUCATION/TRAINING PROGRAM

## 2020-02-24 PROCEDURE — 85027 COMPLETE CBC AUTOMATED: CPT

## 2020-02-24 PROCEDURE — 36415 COLL VENOUS BLD VENIPUNCTURE: CPT

## 2020-02-24 PROCEDURE — 7100000010 HC PHASE II RECOVERY - FIRST 15 MIN: Performed by: SPECIALIST

## 2020-02-24 PROCEDURE — 6360000002 HC RX W HCPCS: Performed by: NURSE ANESTHETIST, CERTIFIED REGISTERED

## 2020-02-24 PROCEDURE — 74330 X-RAY BILE/PANC ENDOSCOPY: CPT

## 2020-02-24 PROCEDURE — 2580000003 HC RX 258: Performed by: STUDENT IN AN ORGANIZED HEALTH CARE EDUCATION/TRAINING PROGRAM

## 2020-02-24 PROCEDURE — 7100000000 HC PACU RECOVERY - FIRST 15 MIN: Performed by: SPECIALIST

## 2020-02-24 PROCEDURE — 3700000001 HC ADD 15 MINUTES (ANESTHESIA): Performed by: SPECIALIST

## 2020-02-24 PROCEDURE — 7100000001 HC PACU RECOVERY - ADDTL 15 MIN: Performed by: SPECIALIST

## 2020-02-24 PROCEDURE — 3700000000 HC ANESTHESIA ATTENDED CARE: Performed by: SPECIALIST

## 2020-02-24 PROCEDURE — 2720000010 HC SURG SUPPLY STERILE: Performed by: SPECIALIST

## 2020-02-24 PROCEDURE — 80048 BASIC METABOLIC PNL TOTAL CA: CPT

## 2020-02-24 PROCEDURE — 7100000011 HC PHASE II RECOVERY - ADDTL 15 MIN: Performed by: SPECIALIST

## 2020-02-24 PROCEDURE — 2500000003 HC RX 250 WO HCPCS: Performed by: NURSE ANESTHETIST, CERTIFIED REGISTERED

## 2020-02-24 PROCEDURE — 2709999900 HC NON-CHARGEABLE SUPPLY: Performed by: SPECIALIST

## 2020-02-24 PROCEDURE — 3600007513: Performed by: SPECIALIST

## 2020-02-24 PROCEDURE — 82962 GLUCOSE BLOOD TEST: CPT

## 2020-02-24 PROCEDURE — 6360000004 HC RX CONTRAST MEDICATION: Performed by: SPECIALIST

## 2020-02-24 PROCEDURE — C1769 GUIDE WIRE: HCPCS | Performed by: SPECIALIST

## 2020-02-24 PROCEDURE — 3600007503: Performed by: SPECIALIST

## 2020-02-24 RX ORDER — PROPOFOL 10 MG/ML
INJECTION, EMULSION INTRAVENOUS PRN
Status: DISCONTINUED | OUTPATIENT
Start: 2020-02-24 | End: 2020-02-24 | Stop reason: SDUPTHER

## 2020-02-24 RX ORDER — FENTANYL CITRATE 50 UG/ML
INJECTION, SOLUTION INTRAMUSCULAR; INTRAVENOUS PRN
Status: DISCONTINUED | OUTPATIENT
Start: 2020-02-24 | End: 2020-02-24 | Stop reason: SDUPTHER

## 2020-02-24 RX ORDER — SODIUM CHLORIDE 0.9 % (FLUSH) 0.9 %
10 SYRINGE (ML) INJECTION EVERY 12 HOURS SCHEDULED
Status: DISCONTINUED | OUTPATIENT
Start: 2020-02-24 | End: 2020-02-24 | Stop reason: HOSPADM

## 2020-02-24 RX ORDER — SUCCINYLCHOLINE/SOD CL,ISO/PF 200MG/10ML
SYRINGE (ML) INTRAVENOUS PRN
Status: DISCONTINUED | OUTPATIENT
Start: 2020-02-24 | End: 2020-02-24 | Stop reason: SDUPTHER

## 2020-02-24 RX ORDER — DICYCLOMINE HCL 20 MG
20 TABLET ORAL EVERY 6 HOURS
Status: ON HOLD | COMMUNITY
End: 2020-03-14

## 2020-02-24 RX ORDER — ONDANSETRON 2 MG/ML
INJECTION INTRAMUSCULAR; INTRAVENOUS PRN
Status: DISCONTINUED | OUTPATIENT
Start: 2020-02-24 | End: 2020-02-24 | Stop reason: SDUPTHER

## 2020-02-24 RX ORDER — DEXAMETHASONE SODIUM PHOSPHATE 10 MG/ML
INJECTION, SOLUTION INTRAMUSCULAR; INTRAVENOUS PRN
Status: DISCONTINUED | OUTPATIENT
Start: 2020-02-24 | End: 2020-02-24 | Stop reason: SDUPTHER

## 2020-02-24 RX ORDER — MEPERIDINE HYDROCHLORIDE 25 MG/ML
12.5 INJECTION INTRAMUSCULAR; INTRAVENOUS; SUBCUTANEOUS EVERY 5 MIN PRN
Status: DISCONTINUED | OUTPATIENT
Start: 2020-02-24 | End: 2020-02-24 | Stop reason: HOSPADM

## 2020-02-24 RX ORDER — SODIUM CHLORIDE 0.9 % (FLUSH) 0.9 %
10 SYRINGE (ML) INJECTION PRN
Status: DISCONTINUED | OUTPATIENT
Start: 2020-02-24 | End: 2020-02-24 | Stop reason: HOSPADM

## 2020-02-24 RX ORDER — SODIUM CHLORIDE, SODIUM LACTATE, POTASSIUM CHLORIDE, CALCIUM CHLORIDE 600; 310; 30; 20 MG/100ML; MG/100ML; MG/100ML; MG/100ML
INJECTION, SOLUTION INTRAVENOUS CONTINUOUS
Status: DISCONTINUED | OUTPATIENT
Start: 2020-02-24 | End: 2020-02-24 | Stop reason: HOSPADM

## 2020-02-24 RX ORDER — ONDANSETRON 2 MG/ML
4 INJECTION INTRAMUSCULAR; INTRAVENOUS
Status: DISCONTINUED | OUTPATIENT
Start: 2020-02-24 | End: 2020-02-24 | Stop reason: HOSPADM

## 2020-02-24 RX ORDER — DICYCLOMINE HCL 20 MG
TABLET ORAL
COMMUNITY
Start: 2020-02-20 | End: 2020-07-14

## 2020-02-24 RX ORDER — FENTANYL CITRATE 50 UG/ML
25 INJECTION, SOLUTION INTRAMUSCULAR; INTRAVENOUS EVERY 5 MIN PRN
Status: DISCONTINUED | OUTPATIENT
Start: 2020-02-24 | End: 2020-02-24 | Stop reason: HOSPADM

## 2020-02-24 RX ORDER — MIDAZOLAM HYDROCHLORIDE 1 MG/ML
INJECTION INTRAMUSCULAR; INTRAVENOUS PRN
Status: DISCONTINUED | OUTPATIENT
Start: 2020-02-24 | End: 2020-02-24 | Stop reason: SDUPTHER

## 2020-02-24 RX ADMIN — MIDAZOLAM 2 MG: 1 INJECTION INTRAMUSCULAR; INTRAVENOUS at 16:13

## 2020-02-24 RX ADMIN — SODIUM CHLORIDE, POTASSIUM CHLORIDE, SODIUM LACTATE AND CALCIUM CHLORIDE: 600; 310; 30; 20 INJECTION, SOLUTION INTRAVENOUS at 16:13

## 2020-02-24 RX ADMIN — SODIUM CHLORIDE, POTASSIUM CHLORIDE, SODIUM LACTATE AND CALCIUM CHLORIDE: 600; 310; 30; 20 INJECTION, SOLUTION INTRAVENOUS at 14:15

## 2020-02-24 RX ADMIN — DEXAMETHASONE SODIUM PHOSPHATE 10 MG: 10 INJECTION, SOLUTION INTRAMUSCULAR; INTRAVENOUS at 16:15

## 2020-02-24 RX ADMIN — Medication 140 MG: at 16:15

## 2020-02-24 RX ADMIN — PROPOFOL 200 MG: 10 INJECTION, EMULSION INTRAVENOUS at 16:15

## 2020-02-24 RX ADMIN — FENTANYL CITRATE 100 MCG: 50 INJECTION, SOLUTION INTRAMUSCULAR; INTRAVENOUS at 16:15

## 2020-02-24 RX ADMIN — ONDANSETRON HYDROCHLORIDE 4 MG: 2 INJECTION, SOLUTION INTRAMUSCULAR; INTRAVENOUS at 16:15

## 2020-02-24 RX ADMIN — INDOMETHACIN 100 MG: 50 SUPPOSITORY RECTAL at 14:25

## 2020-02-24 ASSESSMENT — PAIN - FUNCTIONAL ASSESSMENT: PAIN_FUNCTIONAL_ASSESSMENT: 0-10

## 2020-02-24 ASSESSMENT — PULMONARY FUNCTION TESTS
PIF_VALUE: 31
PIF_VALUE: 5
PIF_VALUE: 36
PIF_VALUE: 35
PIF_VALUE: 1
PIF_VALUE: 35
PIF_VALUE: 35
PIF_VALUE: 1
PIF_VALUE: 35
PIF_VALUE: 35
PIF_VALUE: 1
PIF_VALUE: 35
PIF_VALUE: 5
PIF_VALUE: 35
PIF_VALUE: 35
PIF_VALUE: 29
PIF_VALUE: 36
PIF_VALUE: 5
PIF_VALUE: 0
PIF_VALUE: 35
PIF_VALUE: 5
PIF_VALUE: 36
PIF_VALUE: 35
PIF_VALUE: 35
PIF_VALUE: 25
PIF_VALUE: 36
PIF_VALUE: 2
PIF_VALUE: 12
PIF_VALUE: 36
PIF_VALUE: 35
PIF_VALUE: 0

## 2020-02-24 ASSESSMENT — PAIN SCALES - GENERAL
PAINLEVEL_OUTOF10: 0

## 2020-02-24 NOTE — ANESTHESIA PRE PROCEDURE
01/02/2020    CL 99 01/05/2020    CO2 23 01/05/2020    BUN 17 01/05/2020    CREATININE 0.9 01/05/2020    GFRAA >60 01/05/2020    LABGLOM >60 01/05/2020    GLUCOSE 135 01/05/2020    GLUCOSE 150 02/23/2012    PROT 6.5 01/05/2020    CALCIUM 9.4 01/05/2020    BILITOT 0.7 01/05/2020    ALKPHOS 240 01/05/2020    AST 35 01/05/2020     01/05/2020       POC Tests:   No results for input(s): POCGLU, POCNA, POCK, POCCL, POCBUN, POCHEMO, POCHCT in the last 72 hours. Coags:   Lab Results   Component Value Date    PROTIME 11.8 01/02/2020    INR 1.0 01/02/2020    APTT 31.7 01/18/2019       HCG (If Applicable):   Lab Results   Component Value Date    PREGTESTUR NEGATIVE 01/03/2020        ABGs: No results found for: PHART, PO2ART, XLN3RDT, NFM0IUD, BEART, Z5WYPMVA     Type & Screen (If Applicable):  No results found for: LABABO, [address]    Anesthesia Evaluation  Patient summary reviewed and Nursing notes reviewed no history of anesthetic complications:   Airway: Mallampati: II  TM distance: >3 FB   Neck ROM: full  Mouth opening: > = 3 FB Dental: normal exam         Pulmonary: breath sounds clear to auscultation  (+) sleep apnea: on CPAP,                             Cardiovascular:  Exercise tolerance: good (>4 METS),   (+) hypertension: moderate, hyperlipidemia      ECG reviewed  Rhythm: regular  Rate: normal                    Neuro/Psych:   (+) psychiatric history:depression/anxiety             GI/Hepatic/Renal:   (+) GERD:, liver disease (Fatty Liver):, morbid obesity         ROS comment: Obstructive jaundice  Past ERCP. Endo/Other:    (+) DiabetesType II DM, using insulin, blood dyscrasia::., .    Hypothyroidism: MTHFR Mutation (Thromboembolic Disease)               Abdominal:   (+) obese,         Vascular: negative vascular ROS. Anesthesia Plan      general     ASA 3       Induction: intravenous. MIPS: Prophylactic antiemetics administered.   Anesthetic plan and risks discussed with patient. Plan discussed with CRNA.                   Trevor Ramirez MD   2/24/2020
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
## 2020-03-13 ENCOUNTER — HOSPITAL ENCOUNTER (INPATIENT)
Age: 50
LOS: 2 days | Discharge: ANOTHER HOSPITAL | DRG: 281 | End: 2020-03-16
Attending: EMERGENCY MEDICINE | Admitting: INTERNAL MEDICINE
Payer: COMMERCIAL

## 2020-03-13 PROCEDURE — 83880 ASSAY OF NATRIURETIC PEPTIDE: CPT

## 2020-03-13 PROCEDURE — 6360000002 HC RX W HCPCS: Performed by: EMERGENCY MEDICINE

## 2020-03-13 PROCEDURE — 87502 INFLUENZA DNA AMP PROBE: CPT

## 2020-03-13 PROCEDURE — 85027 COMPLETE CBC AUTOMATED: CPT

## 2020-03-13 PROCEDURE — 94760 N-INVAS EAR/PLS OXIMETRY 1: CPT

## 2020-03-13 PROCEDURE — 99285 EMERGENCY DEPT VISIT HI MDM: CPT

## 2020-03-13 PROCEDURE — 93005 ELECTROCARDIOGRAM TRACING: CPT | Performed by: EMERGENCY MEDICINE

## 2020-03-13 PROCEDURE — 96374 THER/PROPH/DIAG INJ IV PUSH: CPT

## 2020-03-13 PROCEDURE — 80048 BASIC METABOLIC PNL TOTAL CA: CPT

## 2020-03-13 PROCEDURE — 84484 ASSAY OF TROPONIN QUANT: CPT

## 2020-03-13 RX ORDER — IPRATROPIUM BROMIDE AND ALBUTEROL SULFATE 2.5; .5 MG/3ML; MG/3ML
3 SOLUTION RESPIRATORY (INHALATION) CONTINUOUS
Status: DISCONTINUED | OUTPATIENT
Start: 2020-03-13 | End: 2020-03-14

## 2020-03-13 RX ORDER — KETOROLAC TROMETHAMINE 30 MG/ML
30 INJECTION, SOLUTION INTRAMUSCULAR; INTRAVENOUS ONCE
Status: COMPLETED | OUTPATIENT
Start: 2020-03-13 | End: 2020-03-13

## 2020-03-13 RX ADMIN — KETOROLAC TROMETHAMINE 30 MG: 30 INJECTION, SOLUTION INTRAMUSCULAR; INTRAVENOUS at 23:37

## 2020-03-13 ASSESSMENT — ENCOUNTER SYMPTOMS
WHEEZING: 0
SORE THROAT: 0
EYE PAIN: 0
ABDOMINAL DISTENTION: 0
EYE DISCHARGE: 0
EYE REDNESS: 0
SHORTNESS OF BREATH: 1
NAUSEA: 0
BACK PAIN: 0
SINUS PRESSURE: 0
VOMITING: 0
DIARRHEA: 0
COUGH: 1

## 2020-03-13 ASSESSMENT — PAIN DESCRIPTION - LOCATION: LOCATION: CHEST

## 2020-03-13 ASSESSMENT — PAIN SCALES - GENERAL
PAINLEVEL_OUTOF10: 5
PAINLEVEL_OUTOF10: 5

## 2020-03-13 ASSESSMENT — PAIN DESCRIPTION - PAIN TYPE: TYPE: ACUTE PAIN

## 2020-03-14 ENCOUNTER — APPOINTMENT (OUTPATIENT)
Dept: GENERAL RADIOLOGY | Age: 50
DRG: 281 | End: 2020-03-14
Payer: COMMERCIAL

## 2020-03-14 ENCOUNTER — APPOINTMENT (OUTPATIENT)
Dept: CT IMAGING | Age: 50
DRG: 281 | End: 2020-03-14
Payer: COMMERCIAL

## 2020-03-14 PROBLEM — R07.9 CHEST PAIN: Status: ACTIVE | Noted: 2020-03-14

## 2020-03-14 PROBLEM — I21.4 NSTEMI (NON-ST ELEVATED MYOCARDIAL INFARCTION) (HCC): Status: ACTIVE | Noted: 2020-03-14

## 2020-03-14 LAB
ANION GAP SERPL CALCULATED.3IONS-SCNC: 19 MMOL/L (ref 7–16)
BUN BLDV-MCNC: 22 MG/DL (ref 6–20)
CALCIUM SERPL-MCNC: 10.2 MG/DL (ref 8.6–10.2)
CHLORIDE BLD-SCNC: 94 MMOL/L (ref 98–107)
CO2: 22 MMOL/L (ref 22–29)
CREAT SERPL-MCNC: 1.1 MG/DL (ref 0.5–1)
EKG ATRIAL RATE: 86 BPM
EKG P AXIS: 28 DEGREES
EKG P-R INTERVAL: 146 MS
EKG Q-T INTERVAL: 362 MS
EKG QRS DURATION: 88 MS
EKG QTC CALCULATION (BAZETT): 433 MS
EKG R AXIS: -27 DEGREES
EKG T AXIS: 46 DEGREES
EKG VENTRICULAR RATE: 86 BPM
GFR AFRICAN AMERICAN: >60
GFR NON-AFRICAN AMERICAN: 53 ML/MIN/1.73
GLUCOSE BLD-MCNC: 364 MG/DL (ref 74–99)
HCG QUALITATIVE: NEGATIVE
HCT VFR BLD CALC: 40 % (ref 34–48)
HEMOGLOBIN: 14 G/DL (ref 11.5–15.5)
INFLUENZA A BY PCR: NOT DETECTED
INFLUENZA B BY PCR: NOT DETECTED
MCH RBC QN AUTO: 28.3 PG (ref 26–35)
MCHC RBC AUTO-ENTMCNC: 35 % (ref 32–34.5)
MCV RBC AUTO: 80.8 FL (ref 80–99.9)
METER GLUCOSE: 197 MG/DL (ref 74–99)
METER GLUCOSE: 226 MG/DL (ref 74–99)
METER GLUCOSE: 373 MG/DL (ref 74–99)
PDW BLD-RTO: 12.8 FL (ref 11.5–15)
PLATELET # BLD: 436 E9/L (ref 130–450)
PMV BLD AUTO: 10.4 FL (ref 7–12)
POTASSIUM SERPL-SCNC: 4.3 MMOL/L (ref 3.5–5)
PRO-BNP: 297 PG/ML (ref 0–125)
RBC # BLD: 4.95 E12/L (ref 3.5–5.5)
SODIUM BLD-SCNC: 135 MMOL/L (ref 132–146)
TROPONIN: 0.15 NG/ML (ref 0–0.03)
TROPONIN: 0.26 NG/ML (ref 0–0.03)
TROPONIN: 0.39 NG/ML (ref 0–0.03)
TROPONIN: <0.01 NG/ML (ref 0–0.03)
WBC # BLD: 14 E9/L (ref 4.5–11.5)

## 2020-03-14 PROCEDURE — 6370000000 HC RX 637 (ALT 250 FOR IP): Performed by: INTERNAL MEDICINE

## 2020-03-14 PROCEDURE — 6360000002 HC RX W HCPCS: Performed by: EMERGENCY MEDICINE

## 2020-03-14 PROCEDURE — 82962 GLUCOSE BLOOD TEST: CPT

## 2020-03-14 PROCEDURE — 6370000000 HC RX 637 (ALT 250 FOR IP): Performed by: EMERGENCY MEDICINE

## 2020-03-14 PROCEDURE — 6360000002 HC RX W HCPCS: Performed by: INTERNAL MEDICINE

## 2020-03-14 PROCEDURE — 71275 CT ANGIOGRAPHY CHEST: CPT

## 2020-03-14 PROCEDURE — 71045 X-RAY EXAM CHEST 1 VIEW: CPT

## 2020-03-14 PROCEDURE — 93010 ELECTROCARDIOGRAM REPORT: CPT | Performed by: INTERNAL MEDICINE

## 2020-03-14 PROCEDURE — 2580000003 HC RX 258: Performed by: INTERNAL MEDICINE

## 2020-03-14 PROCEDURE — 99254 IP/OBS CNSLTJ NEW/EST MOD 60: CPT | Performed by: INTERNAL MEDICINE

## 2020-03-14 PROCEDURE — 94644 CONT INHLJ TX 1ST HOUR: CPT

## 2020-03-14 PROCEDURE — G0378 HOSPITAL OBSERVATION PER HR: HCPCS

## 2020-03-14 PROCEDURE — 96375 TX/PRO/DX INJ NEW DRUG ADDON: CPT

## 2020-03-14 PROCEDURE — 6360000004 HC RX CONTRAST MEDICATION: Performed by: RADIOLOGY

## 2020-03-14 PROCEDURE — 2580000003 HC RX 258: Performed by: EMERGENCY MEDICINE

## 2020-03-14 PROCEDURE — 1200000000 HC SEMI PRIVATE

## 2020-03-14 PROCEDURE — 84484 ASSAY OF TROPONIN QUANT: CPT

## 2020-03-14 PROCEDURE — 84703 CHORIONIC GONADOTROPIN ASSAY: CPT

## 2020-03-14 PROCEDURE — 36415 COLL VENOUS BLD VENIPUNCTURE: CPT

## 2020-03-14 RX ORDER — PROMETHAZINE HYDROCHLORIDE 25 MG/1
12.5 TABLET ORAL EVERY 6 HOURS PRN
Status: DISCONTINUED | OUTPATIENT
Start: 2020-03-14 | End: 2020-03-16 | Stop reason: HOSPADM

## 2020-03-14 RX ORDER — INSULIN GLARGINE 100 [IU]/ML
50 INJECTION, SOLUTION SUBCUTANEOUS NIGHTLY
Status: DISCONTINUED | OUTPATIENT
Start: 2020-03-14 | End: 2020-03-16 | Stop reason: HOSPADM

## 2020-03-14 RX ORDER — DEXTROSE MONOHYDRATE 25 G/50ML
12.5 INJECTION, SOLUTION INTRAVENOUS PRN
Status: DISCONTINUED | OUTPATIENT
Start: 2020-03-14 | End: 2020-03-16 | Stop reason: HOSPADM

## 2020-03-14 RX ORDER — SODIUM CHLORIDE 0.9 % (FLUSH) 0.9 %
10 SYRINGE (ML) INJECTION PRN
Status: DISCONTINUED | OUTPATIENT
Start: 2020-03-14 | End: 2020-03-16 | Stop reason: HOSPADM

## 2020-03-14 RX ORDER — 0.9 % SODIUM CHLORIDE 0.9 %
1000 INTRAVENOUS SOLUTION INTRAVENOUS ONCE
Status: COMPLETED | OUTPATIENT
Start: 2020-03-14 | End: 2020-03-14

## 2020-03-14 RX ORDER — PANTOPRAZOLE SODIUM 40 MG/1
40 TABLET, DELAYED RELEASE ORAL NIGHTLY
Status: DISCONTINUED | OUTPATIENT
Start: 2020-03-14 | End: 2020-03-16 | Stop reason: HOSPADM

## 2020-03-14 RX ORDER — ONDANSETRON 2 MG/ML
4 INJECTION INTRAMUSCULAR; INTRAVENOUS EVERY 6 HOURS PRN
Status: DISCONTINUED | OUTPATIENT
Start: 2020-03-14 | End: 2020-03-16 | Stop reason: HOSPADM

## 2020-03-14 RX ORDER — FLUOXETINE HYDROCHLORIDE 20 MG/1
20 CAPSULE ORAL DAILY
Status: DISCONTINUED | OUTPATIENT
Start: 2020-03-14 | End: 2020-03-16 | Stop reason: HOSPADM

## 2020-03-14 RX ORDER — DEXTROSE MONOHYDRATE 50 MG/ML
100 INJECTION, SOLUTION INTRAVENOUS PRN
Status: DISCONTINUED | OUTPATIENT
Start: 2020-03-14 | End: 2020-03-16 | Stop reason: HOSPADM

## 2020-03-14 RX ORDER — SODIUM CHLORIDE 0.9 % (FLUSH) 0.9 %
10 SYRINGE (ML) INJECTION EVERY 12 HOURS SCHEDULED
Status: DISCONTINUED | OUTPATIENT
Start: 2020-03-14 | End: 2020-03-16 | Stop reason: HOSPADM

## 2020-03-14 RX ORDER — ACETAMINOPHEN 325 MG/1
650 TABLET ORAL EVERY 6 HOURS PRN
Status: DISCONTINUED | OUTPATIENT
Start: 2020-03-14 | End: 2020-03-16 | Stop reason: HOSPADM

## 2020-03-14 RX ORDER — ASPIRIN 81 MG/1
324 TABLET, CHEWABLE ORAL ONCE
Status: COMPLETED | OUTPATIENT
Start: 2020-03-14 | End: 2020-03-14

## 2020-03-14 RX ORDER — NITROGLYCERIN 0.4 MG/1
0.4 TABLET SUBLINGUAL EVERY 5 MIN PRN
Status: DISCONTINUED | OUTPATIENT
Start: 2020-03-14 | End: 2020-03-16 | Stop reason: HOSPADM

## 2020-03-14 RX ORDER — SODIUM CHLORIDE 0.9 % (FLUSH) 0.9 %
10 SYRINGE (ML) INJECTION PRN
Status: DISCONTINUED | OUTPATIENT
Start: 2020-03-14 | End: 2020-03-14

## 2020-03-14 RX ORDER — DIPHENHYDRAMINE HCL 25 MG
50 TABLET ORAL NIGHTLY
Status: DISCONTINUED | OUTPATIENT
Start: 2020-03-14 | End: 2020-03-16 | Stop reason: HOSPADM

## 2020-03-14 RX ORDER — SODIUM CHLORIDE 0.9 % (FLUSH) 0.9 %
10 SYRINGE (ML) INJECTION EVERY 12 HOURS SCHEDULED
Status: DISCONTINUED | OUTPATIENT
Start: 2020-03-14 | End: 2020-03-14

## 2020-03-14 RX ORDER — DICYCLOMINE HCL 20 MG
20 TABLET ORAL ONCE
Status: COMPLETED | OUTPATIENT
Start: 2020-03-14 | End: 2020-03-14

## 2020-03-14 RX ORDER — FENTANYL CITRATE 50 UG/ML
50 INJECTION, SOLUTION INTRAMUSCULAR; INTRAVENOUS ONCE
Status: COMPLETED | OUTPATIENT
Start: 2020-03-14 | End: 2020-03-14

## 2020-03-14 RX ORDER — URSODIOL 300 MG/1
300 CAPSULE ORAL 2 TIMES DAILY
Status: DISCONTINUED | OUTPATIENT
Start: 2020-03-14 | End: 2020-03-16 | Stop reason: HOSPADM

## 2020-03-14 RX ORDER — ACETAMINOPHEN 325 MG/1
650 TABLET ORAL EVERY 4 HOURS PRN
Status: DISCONTINUED | OUTPATIENT
Start: 2020-03-14 | End: 2020-03-14

## 2020-03-14 RX ORDER — ALBUTEROL SULFATE 90 UG/1
2 AEROSOL, METERED RESPIRATORY (INHALATION) EVERY 6 HOURS PRN
Status: DISCONTINUED | OUTPATIENT
Start: 2020-03-14 | End: 2020-03-14 | Stop reason: CLARIF

## 2020-03-14 RX ORDER — TIZANIDINE 4 MG/1
4 TABLET ORAL NIGHTLY
Status: DISCONTINUED | OUTPATIENT
Start: 2020-03-14 | End: 2020-03-16 | Stop reason: HOSPADM

## 2020-03-14 RX ORDER — NITROGLYCERIN 0.4 MG/1
0.4 TABLET SUBLINGUAL EVERY 5 MIN PRN
Status: DISCONTINUED | OUTPATIENT
Start: 2020-03-14 | End: 2020-03-14

## 2020-03-14 RX ORDER — METHYLPREDNISOLONE 4 MG/1
4 TABLET ORAL
Status: ON HOLD | COMMUNITY
Start: 2020-03-12 | End: 2020-03-15 | Stop reason: HOSPADM

## 2020-03-14 RX ORDER — ACETAMINOPHEN 650 MG/1
650 SUPPOSITORY RECTAL EVERY 6 HOURS PRN
Status: DISCONTINUED | OUTPATIENT
Start: 2020-03-14 | End: 2020-03-16 | Stop reason: HOSPADM

## 2020-03-14 RX ORDER — NICOTINE POLACRILEX 4 MG
15 LOZENGE BUCCAL PRN
Status: DISCONTINUED | OUTPATIENT
Start: 2020-03-14 | End: 2020-03-16 | Stop reason: HOSPADM

## 2020-03-14 RX ORDER — DIPHENHYDRAMINE HCL 50 MG
50 CAPSULE ORAL NIGHTLY
COMMUNITY

## 2020-03-14 RX ORDER — POLYETHYLENE GLYCOL 3350 17 G/17G
17 POWDER, FOR SOLUTION ORAL DAILY PRN
Status: DISCONTINUED | OUTPATIENT
Start: 2020-03-14 | End: 2020-03-16 | Stop reason: HOSPADM

## 2020-03-14 RX ORDER — METOPROLOL SUCCINATE 50 MG/1
50 TABLET, EXTENDED RELEASE ORAL 2 TIMES DAILY
Status: DISCONTINUED | OUTPATIENT
Start: 2020-03-14 | End: 2020-03-16 | Stop reason: HOSPADM

## 2020-03-14 RX ORDER — DICYCLOMINE HCL 20 MG
20 TABLET ORAL 2 TIMES DAILY
Status: DISCONTINUED | OUTPATIENT
Start: 2020-03-14 | End: 2020-03-16 | Stop reason: HOSPADM

## 2020-03-14 RX ORDER — ASPIRIN 81 MG/1
81 TABLET, CHEWABLE ORAL DAILY
Status: DISCONTINUED | OUTPATIENT
Start: 2020-03-15 | End: 2020-03-16 | Stop reason: HOSPADM

## 2020-03-14 RX ORDER — TRIAMTERENE AND HYDROCHLOROTHIAZIDE 37.5; 25 MG/1; MG/1
2 TABLET ORAL DAILY
Status: DISCONTINUED | OUTPATIENT
Start: 2020-03-14 | End: 2020-03-16 | Stop reason: HOSPADM

## 2020-03-14 RX ORDER — ALBUTEROL SULFATE 2.5 MG/3ML
2.5 SOLUTION RESPIRATORY (INHALATION) EVERY 6 HOURS PRN
Status: DISCONTINUED | OUTPATIENT
Start: 2020-03-14 | End: 2020-03-16 | Stop reason: HOSPADM

## 2020-03-14 RX ADMIN — ACETAMINOPHEN 650 MG: 325 TABLET, FILM COATED ORAL at 17:02

## 2020-03-14 RX ADMIN — TRIAMTERENE AND HYDROCHLOROTHIAZIDE 2 TABLET: 37.5; 25 TABLET ORAL at 12:41

## 2020-03-14 RX ADMIN — URSODIOL 300 MG: 300 CAPSULE ORAL at 12:41

## 2020-03-14 RX ADMIN — ENOXAPARIN SODIUM 100 MG: 100 INJECTION SUBCUTANEOUS at 20:05

## 2020-03-14 RX ADMIN — INSULIN LISPRO 2 UNITS: 100 INJECTION, SOLUTION INTRAVENOUS; SUBCUTANEOUS at 17:03

## 2020-03-14 RX ADMIN — DIPHENHYDRAMINE HYDROCHLORIDE 50 MG: 25 TABLET ORAL at 21:30

## 2020-03-14 RX ADMIN — SODIUM CHLORIDE 1000 ML: 9 INJECTION, SOLUTION INTRAVENOUS at 01:20

## 2020-03-14 RX ADMIN — METOPROLOL SUCCINATE 50 MG: 50 TABLET, EXTENDED RELEASE ORAL at 12:41

## 2020-03-14 RX ADMIN — FLUOXETINE 20 MG: 20 CAPSULE ORAL at 12:41

## 2020-03-14 RX ADMIN — TIZANIDINE 4 MG: 4 TABLET ORAL at 21:30

## 2020-03-14 RX ADMIN — URSODIOL 300 MG: 300 CAPSULE ORAL at 22:12

## 2020-03-14 RX ADMIN — INSULIN GLARGINE 50 UNITS: 100 INJECTION, SOLUTION SUBCUTANEOUS at 21:34

## 2020-03-14 RX ADMIN — SODIUM CHLORIDE, PRESERVATIVE FREE 10 ML: 5 INJECTION INTRAVENOUS at 21:31

## 2020-03-14 RX ADMIN — NITROGLYCERIN 1 INCH: 20 OINTMENT TOPICAL at 03:43

## 2020-03-14 RX ADMIN — PANTOPRAZOLE SODIUM 40 MG: 40 TABLET, DELAYED RELEASE ORAL at 21:31

## 2020-03-14 RX ADMIN — INSULIN LISPRO 2 UNITS: 100 INJECTION, SOLUTION INTRAVENOUS; SUBCUTANEOUS at 21:33

## 2020-03-14 RX ADMIN — NITROGLYCERIN 0.4 MG: 0.4 TABLET SUBLINGUAL at 01:35

## 2020-03-14 RX ADMIN — DICYCLOMINE HYDROCHLORIDE 20 MG: 20 TABLET ORAL at 21:30

## 2020-03-14 RX ADMIN — INSULIN LISPRO 10 UNITS: 100 INJECTION, SOLUTION INTRAVENOUS; SUBCUTANEOUS at 12:21

## 2020-03-14 RX ADMIN — METOPROLOL SUCCINATE 50 MG: 50 TABLET, EXTENDED RELEASE ORAL at 21:29

## 2020-03-14 RX ADMIN — FENTANYL CITRATE 50 MCG: 50 INJECTION, SOLUTION INTRAMUSCULAR; INTRAVENOUS at 03:40

## 2020-03-14 RX ADMIN — IPRATROPIUM BROMIDE AND ALBUTEROL SULFATE 3 AMPULE: .5; 3 SOLUTION RESPIRATORY (INHALATION) at 00:13

## 2020-03-14 RX ADMIN — IOPAMIDOL 80 ML: 755 INJECTION, SOLUTION INTRAVENOUS at 01:53

## 2020-03-14 RX ADMIN — ENOXAPARIN SODIUM 40 MG: 40 INJECTION SUBCUTANEOUS at 12:41

## 2020-03-14 RX ADMIN — DICYCLOMINE HYDROCHLORIDE 20 MG: 20 TABLET ORAL at 12:41

## 2020-03-14 RX ADMIN — ASPIRIN 81 MG 324 MG: 81 TABLET ORAL at 01:35

## 2020-03-14 RX ADMIN — NITROGLYCERIN 0.4 MG: 0.4 TABLET SUBLINGUAL at 02:00

## 2020-03-14 ASSESSMENT — PAIN SCALES - GENERAL
PAINLEVEL_OUTOF10: 8
PAINLEVEL_OUTOF10: 5
PAINLEVEL_OUTOF10: 1
PAINLEVEL_OUTOF10: 2
PAINLEVEL_OUTOF10: 8
PAINLEVEL_OUTOF10: 9

## 2020-03-14 ASSESSMENT — PAIN DESCRIPTION - LOCATION
LOCATION: ARM;CHEST
LOCATION: CHEST;ARM
LOCATION: HEAD

## 2020-03-14 ASSESSMENT — PAIN DESCRIPTION - DESCRIPTORS
DESCRIPTORS: ACHING;CONSTANT;DISCOMFORT
DESCRIPTORS: ACHING

## 2020-03-14 ASSESSMENT — PAIN DESCRIPTION - ORIENTATION
ORIENTATION: RIGHT;LEFT
ORIENTATION: LEFT

## 2020-03-14 ASSESSMENT — PAIN DESCRIPTION - PAIN TYPE
TYPE: ACUTE PAIN

## 2020-03-14 ASSESSMENT — PAIN DESCRIPTION - PROGRESSION: CLINICAL_PROGRESSION: NOT CHANGED

## 2020-03-14 ASSESSMENT — PAIN DESCRIPTION - ONSET: ONSET: ON-GOING

## 2020-03-14 ASSESSMENT — PAIN DESCRIPTION - FREQUENCY
FREQUENCY: CONTINUOUS
FREQUENCY: CONTINUOUS

## 2020-03-14 NOTE — CONSULTS
does not drink alcohol or use drugs.     Family History: family history includes Diabetes in her father and mother; High Blood Pressure in her brother and brother; Other in her brother.      The patients home medications have been reviewed.     Allergies: Latex; Morphine; Other; Statins; Adhesive tape; and Sulfa antibiotics    Diagnostics:       Telemetry: Reviewed    12 lead EKG: Reviewed         No intake or output data in the 24 hours ending 20 1131    Labs:   CBC:   Recent Labs     20  2335   WBC 14.0*   HGB 14.0   HCT 40.0        BMP:   Recent Labs     20  2335      K 4.3   CO2 22   BUN 22*   CREATININE 1.1*   LABGLOM 53   CALCIUM 10.2     Mag: No results for input(s): MG in the last 72 hours. Phos: No results for input(s): PHOS in the last 72 hours. TSH: No results for input(s): TSH in the last 72 hours. HgA1c:     BNP: No results for input(s): BNP in the last 72 hours. PT/INR: No results for input(s): PROTIME, INR in the last 72 hours. APTT:No results for input(s): APTT in the last 72 hours. CARDIAC ENZYMES:  Recent Labs     20  2335   TROPONINI <0.01     FASTING LIPID PANEL:  Lab Results   Component Value Date    CHOL 323 2020    HDL 34 2020    LDLCALC - 2020    TRIG 492 2020     LIVER PROFILE:No results for input(s): AST, ALT, LABALBU in the last 72 hours. Current Inpatient Medications:   sodium chloride flush  10 mL Intravenous 2 times per day    enoxaparin  40 mg Subcutaneous Daily       IV Infusions (if any):        PHYSICAL EXAM:     CONSTITUTIONAL:   BP (!) 143/95   Pulse 97   Temp 97.7 °F (36.5 °C)   Resp 17   Ht 5' 3\" (1.6 m)   Wt 222 lb (100.7 kg)   SpO2 95%   BMI 39.33 kg/m²   Pulse  Av.5  Min: 68  Max: 576  Systolic (66KNJ), BGD:654 , Min:143 , YSQ:972    Diastolic (71MYI), KJS:43, Min:86, Max:112    In general, this is a well developed, well nourished who appears stated age.  awake, alert, cooperative, no apparent distress  HEENT: eyes -conjunctivae pink,  Throat - Oral mucosa pink and moist.   Neck-  no stridor, no noted enlargement of the thyroid, no carotid bruit. no jugular venous distention   RESPIRATORY: Chest symmetrical and non-tender to palpation. No accessory muscle use. Lung auscultation - clear to auscultation except few rhonchi  CARDIOVASCULAR:     Heart Inspection shows no noted pulsations  Heart Palpation - no palpable thrills   Heart Ausculation - Regular rate and rhythm, 1/6 systolic murmur, No s3 or rub. No lower extremity edema, no varicosities. Distal pulses palpable, no clubbing or cyanosis   ABDOMEN: Soft, nontender, nondistended. Bowel sounds present  MS: good muscle strength and tone. : Deferred  Rectal Exam: Deferred  SKIN: warm and dry no statis dermatitis or ulcers   NEURO / PSYCH: oriented to person, place        ASSESSMENT/PLAN:    Chest pain - Atypical, First Troponin normal, Repeat Troponin, Exercise Nuclear stress tomorrow due to CAD risk factors    Dyspnea - Likely from RADHA, Obesity - No acute CHF; Give one dose of Lasix 20mg for SOB and high BNP    Essential Hypertension - Controlled    Diabetes    Dyslipidemia - Statin intolerance; low cholesterol diet, exercise and weight loss discussed. Statin intolerance    Non morbid obesity - Diet, exercise and weight loss discussed. RADHA - Not using CPAP    Hx of MTHFR (methylene THF reductase) deficiency    Hx of Common bile duct stones        No family at bed side  Above recommendations d/w her.       Electronically signed by Kemar Teague MD on 3/14/2020 at 11:31 AM  [facility]) Cardiology

## 2020-03-14 NOTE — PROGRESS NOTES
Notified Dr. Roxanna Magana about pts 1200  troponin and patient moving to 4th floor. Asked to order troponin for 6 pm and cancel stress. Supervisor made aware, ok to move patient.

## 2020-03-14 NOTE — H&P
 ERCP  1/4/2020    ERCP STENT INSERTION performed by Kimberly Carvalho MD at [address] [facility] ERCP N/A 2/24/2020    ERCP STENT REMOVAL performed by Kimberly Carvalho MD at [address] [place] [address] Dr PROSPER Koehler 10/27/2014    BALLOON SINSPLASTY SEPTOPLASTY SUBMUCUSAL RESECTION OF INFERIRO TURBINATES     Medications Prior to Admission:    Medications Prior to Admission: diphenhydrAMINE (BENADRYL) 50 MG capsule, Take 50 mg by mouth nightly  methylPREDNISolone (MEDROL DOSEPACK) 4 MG tablet, Take 4 mg by mouth  dicyclomine (BENTYL) 20 MG tablet, TAKE ONE TABLET BY MOUTH TWO TIMES A DAY  dicyclomine (BENTYL) 20 MG tablet, Take 20 mg by mouth every 6 hours  ursodiol (ACTIGALL) 300 MG capsule, Take 1 capsule by mouth 2 times daily  albuterol sulfate HFA (PROVENTIL HFA) 108 (90 Base) MCG/ACT inhaler, Inhale 2 puffs into the lungs every 6 hours as needed for Wheezing  insulin lispro (HUMALOG) 100 UNIT/ML injection vial, Inject 8 Units into the skin 3 times daily (with meals) (Patient taking differently: Inject 0-20 Units into the skin 3 times daily (with meals) 150-200 3 units 201-250 6 units 251-300 9 units 301-350 12 units 351-400 15 units)  Insulin Degludec (TRESIBA FLEXTOUCH SC), Inject 60 Units into the skin nightly Took 30 units   Vasaglar 1/23/2020 in place of trisiba  TiZANidine HCl (ZANAFLEX PO), Take 4 mg by mouth nightly   fluticasone (FLONASE) 50 MCG/ACT nasal spray, 2 sprays by Nasal route daily 2 sprays in each nostril daily (Patient taking differently: 2 sprays by Nasal route as needed 2 sprays in each nostril daily)  pantoprazole (PROTONIX) 40 MG tablet, Take 40 mg by mouth nightly   triamterene-hydrochlorothiazide (MAXZIDE) 75-50 MG per tablet, Take 1 tablet by mouth daily.   metFORMIN (GLUCOPHAGE) 500 MG tablet, Take 500 mg by mouth daily (with breakfast)   FLUoxetine (PROZAC) 20 MG capsule, Take 20 mg by mouth daily   aspirin 81 MG tablet, Take 81 mg by mouth nightly Last dose 10/18/2014  metoprolol (TOPROL-XL) 50 MG XL tablet, Take 50 mg by mouth 2 times daily. Allergies:    Latex; Morphine; Other; Statins; Adhesive tape; and Sulfa antibiotics    Social History:    reports that she has never smoked. She has never used smokeless tobacco. She reports that she does not drink alcohol or use drugs.     Family History:   family history includes Diabetes in her father and mother; High Blood Pressure in her brother and brother; Other in her brother. Review of Systems  Please see HPI above. All bolded are positive. All un-bolded are negative. Gen: fever, chills, fatigue, weakness, diaphoresis, increase in thirst, unintentional weight change, loss of appetite  Head: headache, vision change, hearing loss  Chest: chest pain, chest heaviness, palpitations, orthopnea, PND, VERDIN  Lungs: shortness of breath, wheezing, coughing  Abdomen: abdominal pain, nausea, vomiting, diarrhea, constipation, melena, hematochezia, hematemesis  Extremities: lower extremity edema, myalgias, arthralgias  Urinary: dysuria, hematuria, or increase in frequency  Neurologic: lightheadedness, dizziness, confusion, syncope  Psychiatric: depression, suicidal ideation, or anxiety    PHYSICAL EXAM:  Vitals:  BP (!) 143/95   Pulse 97   Temp 97.7 °F (36.5 °C)   Resp 17   Ht 5' 3\" (1.6 m)   Wt 222 lb (100.7 kg)   SpO2 95%   BMI 39.33 kg/m²     General:  Awake, alert, oriented X 3. Well developed, well nourished, well groomed. No apparent distress. HEENT:  Normocephalic, atraumatic. Pupils equal, round, reactive to light. No scleral icterus. No conjunctival injection. Normal lips, teeth, and gums. No nasal discharge. Neck:  Supple  Heart:  RRR, pos S1S2  Lungs:  CTA bilaterally, bilat symmetrical expansion, no wheeze, rales, or rhonchi  Abdomen:   Bowel sounds present, soft, nontender, no peritoneal signs  Extremities:  No clubbing, cyanosis, or edema  Skin:  Warm and dry, no open lesions or rash  Neuro: are obtained of the chest.  Coronal and sagittal reconstructions were obtained with 3-D maximum intensity projection (MIP) reconstructed images. These were performed on a separate workstation with concurrent supervision for detailed evaluation of the pulmonary arteries. CONTRAST: 80 mL Isovue-370 intravenous contrast. FINDINGS: SUPPORT DEVICES: None LUNGS/CENTRAL AIRWAYS/PLEURA: The lungs are clear. No nodules or opacities are identified. Central airways are patent. PULMONARY ARTERIES: Evaluation is adequate for pulmonary embolus. No filling defects identified to the subsegmental level. HEART/PERICARDIUM/GREAT VESSELS: Cardiac size is normal.  There is no pericardial effusion. The great vessels of the chest are normal in caliber. LYMPH NODES: No thoracic adenopathy by size criteria. NECK BASE/CHEST WALL/DIAPHRAGM: No soft tissue lesions or diaphragmatic abnormality. UPPER ABDOMEN: Limited images through the upper abdomen are unremarkable. OSSEOUS STRUCTURES: No suspicious lytic or blastic lesions. No fractures. 1. No evidence of pulmonary embolism. EKG:  Reviewed    ASSESSMENT:    Principal Problem:    Chest pain  Active Problems:    Hyperlipidemia    MTHFR mutation (HCC)    Diabetes mellitus ([facility] [address]  Resolved Problems:    * No resolved hospital problems. *    PLAN:  Observation - admit pt if rules in  Cycle enzymes  Consult cardiology  Stress test vs cath pending hospital course  Pt unable to tolerate statin - cont vascepa for now    Time spent face to face with patient along with family counseling and discussing care exceeded 50% of the time of the visit. Additional time spent reviewing images and labs, discussing case with nursing, support staff and other physicians; as well as coordinating care. Barbara Ortega MD  11:41 AM  3/14/2020    NOTE:  This report was transcribed using voice recognition software.   Every effort was made to ensure accuracy; however, inadvertent computerized transcription errors may be present.

## 2020-03-14 NOTE — ED PROVIDER NOTES
-------------------------------------------------    LABS:  Results for orders placed or performed during the hospital encounter of 03/13/20   Rapid influenza A/B antigens   Result Value Ref Range    Influenza A by PCR Not Detected Not Detected    Influenza B by PCR Not Detected Not Detected   Basic metabolic panel   Result Value Ref Range    Sodium 135 132 - 146 mmol/L    Potassium 4.3 3.5 - 5.0 mmol/L    Chloride 94 (L) 98 - 107 mmol/L    CO2 22 22 - 29 mmol/L    Anion Gap 19 (H) 7 - 16 mmol/L    Glucose 364 (H) 74 - 99 mg/dL    BUN 22 (H) 6 - 20 mg/dL    CREATININE 1.1 (H) 0.5 - 1.0 mg/dL    GFR Non-African American 53 >=60 mL/min/1.73    GFR African American >60     Calcium 10.2 8.6 - 10.2 mg/dL   CBC   Result Value Ref Range    WBC 14.0 (H) 4.5 - 11.5 E9/L    RBC 4.95 3.50 - 5.50 E12/L    Hemoglobin 14.0 11.5 - 15.5 g/dL    Hematocrit 40.0 34.0 - 48.0 %    MCV 80.8 80.0 - 99.9 fL    MCH 28.3 26.0 - 35.0 pg    MCHC 35.0 (H) 32.0 - 34.5 %    RDW 12.8 11.5 - 15.0 fL    Platelets 341 758 - 014 E9/L    MPV 10.4 7.0 - 12.0 fL   Troponin   Result Value Ref Range    Troponin <0.01 0.00 - 0.03 ng/mL   Brain Natriuretic Peptide   Result Value Ref Range    Pro- (H) 0 - 125 pg/mL   EKG 12 Lead   Result Value Ref Range    Ventricular Rate 86 BPM    Atrial Rate 86 BPM    P-R Interval 146 ms    QRS Duration 88 ms    Q-T Interval 362 ms    QTc Calculation (Bazett) 433 ms    P Axis 28 degrees    R Axis -27 degrees    T Axis 46 degrees       RADIOLOGY:  CTA CHEST W CONTRAST   Final Result   1. No evidence of pulmonary embolism. XR CHEST PORTABLE   Final Result   1. No acute findings           EKG: This EKG is signed and interpreted by me.     Rate: 86  Rhythm: Sinus  Interpretation: non-specific EKG  Comparison: no previous EKG available      ------------------------- NURSING NOTES AND VITALS REVIEWED ---------------------------  Date / Time Roomed:  3/13/2020 11:11 PM  ED Bed Assignment:  06/06    The nursing notes -- 95 17 99 % -- --   03/14/20 0016 -- -- -- 78 10 99 % -- --   03/14/20 0015 -- -- -- 77 13 99 % -- --   03/14/20 0014 -- -- -- 87 16 97 % -- --   03/14/20 0013 -- -- -- 86 15 98 % -- --   03/14/20 0012 -- -- -- 89 17 97 % -- --   03/14/20 0011 -- -- -- 86 15 98 % -- --   03/14/20 0010 -- -- -- 87 13 97 % -- --   03/14/20 0009 -- -- -- 84 14 97 % -- --   03/14/20 0008 -- -- -- 89 14 97 % -- --   03/14/20 0007 -- -- -- 86 14 97 % -- --   03/14/20 0006 -- -- -- 89 14 97 % -- --   03/14/20 0005 -- -- -- 88 14 97 % -- --   03/14/20 0004 -- -- -- 88 15 97 % -- --   03/14/20 0003 -- -- -- 84 14 97 % -- --   03/14/20 0002 -- -- -- 88 15 97 % -- --   03/14/20 0001 -- -- -- 88 17 97 % -- --   03/14/20 0000 -- -- -- 87 17 98 % -- --   03/13/20 2359 -- -- -- 94 17 96 % -- --   03/13/20 2358 -- -- -- 90 24 96 % -- --   03/13/20 2357 -- -- -- 90 19 97 % -- --   03/13/20 2356 -- -- -- 86 19 96 % -- --   03/13/20 2355 -- -- -- 92 29 96 % -- --   03/13/20 2354 -- -- -- 86 19 97 % -- --   03/13/20 2353 -- -- -- 88 27 96 % -- --   03/13/20 2352 -- -- -- 86 23 97 % -- --   03/13/20 2351 -- -- -- 88 15 98 % -- --   03/13/20 2350 -- -- -- 97 15 97 % -- --   03/13/20 2349 -- -- -- 86 17 98 % -- --   03/13/20 2348 -- -- -- 89 20 98 % -- --   03/13/20 2347 (!) 145/105 -- -- 87 19 98 % -- --   03/13/20 2346 -- -- -- 87 (!) 31 98 % -- --   03/13/20 2308 (!) 168/94 98.7 °F (37.1 °C) Oral 85 18 96 % 5' 3\" (1.6 m) 222 lb (100.7 kg)       Oxygen Saturation Interpretation: Normal    ------------------------------------------ PROGRESS NOTES ------------------------------------------  Re-evaluation(s):  Time: 0420. Patients symptoms show no change  Repeat physical examination is not changed    Counseling:  I have spoken with the patient and discussed todays results, in addition to providing specific details for the plan of care and counseling regarding the diagnosis and prognosis.   Their questions are answered at this time and they are

## 2020-03-14 NOTE — PLAN OF CARE
Problem: Pain:  Goal: Control of chronic pain  Description: Control of chronic pain  Outcome: Met This Shift     Problem: Pain:  Goal: Pain level will decrease  Description: Pain level will decrease  Outcome: Not Met This Shift  Note: Pt continues to have complaints of chest pain  Goal: Control of acute pain  Description: Control of acute pain  Outcome: Not Met This Shift  Note: Pt continues to have complaints of chest pain

## 2020-03-14 NOTE — ED NOTES
Pt. To be an IMCU hold. For pt. Comfort hospital bed has been requested.      Siddhartha Albert RN  03/14/20 4671

## 2020-03-15 LAB
ANION GAP SERPL CALCULATED.3IONS-SCNC: 15 MMOL/L (ref 7–16)
BUN BLDV-MCNC: 23 MG/DL (ref 6–20)
CALCIUM SERPL-MCNC: 9.5 MG/DL (ref 8.6–10.2)
CHLORIDE BLD-SCNC: 100 MMOL/L (ref 98–107)
CHOLESTEROL, TOTAL: 320 MG/DL (ref 0–199)
CO2: 25 MMOL/L (ref 22–29)
CREAT SERPL-MCNC: 0.9 MG/DL (ref 0.5–1)
GFR AFRICAN AMERICAN: >60
GFR NON-AFRICAN AMERICAN: >60 ML/MIN/1.73
GLUCOSE BLD-MCNC: 110 MG/DL (ref 74–99)
HBA1C MFR BLD: 9.2 % (ref 4–5.6)
HCT VFR BLD CALC: 38.6 % (ref 34–48)
HDLC SERPL-MCNC: 40 MG/DL
HEMOGLOBIN: 12.9 G/DL (ref 11.5–15.5)
LDL CHOLESTEROL CALCULATED: ABNORMAL MG/DL (ref 0–99)
MCH RBC QN AUTO: 28 PG (ref 26–35)
MCHC RBC AUTO-ENTMCNC: 33.4 % (ref 32–34.5)
MCV RBC AUTO: 83.9 FL (ref 80–99.9)
METER GLUCOSE: 113 MG/DL (ref 74–99)
METER GLUCOSE: 157 MG/DL (ref 74–99)
METER GLUCOSE: 159 MG/DL (ref 74–99)
METER GLUCOSE: 224 MG/DL (ref 74–99)
PDW BLD-RTO: 13.2 FL (ref 11.5–15)
PLATELET # BLD: 387 E9/L (ref 130–450)
PMV BLD AUTO: 10.1 FL (ref 7–12)
POTASSIUM SERPL-SCNC: 3.5 MMOL/L (ref 3.5–5)
RBC # BLD: 4.6 E12/L (ref 3.5–5.5)
SODIUM BLD-SCNC: 140 MMOL/L (ref 132–146)
TRIGL SERPL-MCNC: 714 MG/DL (ref 0–149)
VLDLC SERPL CALC-MCNC: ABNORMAL MG/DL
WBC # BLD: 10.1 E9/L (ref 4.5–11.5)

## 2020-03-15 PROCEDURE — 99233 SBSQ HOSP IP/OBS HIGH 50: CPT | Performed by: INTERNAL MEDICINE

## 2020-03-15 PROCEDURE — 85027 COMPLETE CBC AUTOMATED: CPT

## 2020-03-15 PROCEDURE — 2580000003 HC RX 258: Performed by: INTERNAL MEDICINE

## 2020-03-15 PROCEDURE — 80061 LIPID PANEL: CPT

## 2020-03-15 PROCEDURE — 83036 HEMOGLOBIN GLYCOSYLATED A1C: CPT

## 2020-03-15 PROCEDURE — 6360000002 HC RX W HCPCS: Performed by: INTERNAL MEDICINE

## 2020-03-15 PROCEDURE — 1200000000 HC SEMI PRIVATE

## 2020-03-15 PROCEDURE — 6370000000 HC RX 637 (ALT 250 FOR IP): Performed by: INTERNAL MEDICINE

## 2020-03-15 PROCEDURE — 36415 COLL VENOUS BLD VENIPUNCTURE: CPT

## 2020-03-15 PROCEDURE — 82962 GLUCOSE BLOOD TEST: CPT

## 2020-03-15 PROCEDURE — 80048 BASIC METABOLIC PNL TOTAL CA: CPT

## 2020-03-15 RX ADMIN — FLUOXETINE 20 MG: 20 CAPSULE ORAL at 08:37

## 2020-03-15 RX ADMIN — SODIUM CHLORIDE, PRESERVATIVE FREE 10 ML: 5 INJECTION INTRAVENOUS at 08:37

## 2020-03-15 RX ADMIN — ACETAMINOPHEN 650 MG: 325 TABLET, FILM COATED ORAL at 18:56

## 2020-03-15 RX ADMIN — INSULIN GLARGINE 50 UNITS: 100 INJECTION, SOLUTION SUBCUTANEOUS at 20:11

## 2020-03-15 RX ADMIN — METOPROLOL SUCCINATE 50 MG: 50 TABLET, EXTENDED RELEASE ORAL at 08:37

## 2020-03-15 RX ADMIN — PANTOPRAZOLE SODIUM 40 MG: 40 TABLET, DELAYED RELEASE ORAL at 20:10

## 2020-03-15 RX ADMIN — ENOXAPARIN SODIUM 100 MG: 100 INJECTION SUBCUTANEOUS at 08:37

## 2020-03-15 RX ADMIN — DICYCLOMINE HYDROCHLORIDE 20 MG: 20 TABLET ORAL at 20:10

## 2020-03-15 RX ADMIN — URSODIOL 300 MG: 300 CAPSULE ORAL at 09:47

## 2020-03-15 RX ADMIN — ENOXAPARIN SODIUM 100 MG: 100 INJECTION SUBCUTANEOUS at 20:10

## 2020-03-15 RX ADMIN — DICYCLOMINE HYDROCHLORIDE 20 MG: 20 TABLET ORAL at 08:37

## 2020-03-15 RX ADMIN — INSULIN LISPRO 2 UNITS: 100 INJECTION, SOLUTION INTRAVENOUS; SUBCUTANEOUS at 17:35

## 2020-03-15 RX ADMIN — TRIAMTERENE AND HYDROCHLOROTHIAZIDE 2 TABLET: 37.5; 25 TABLET ORAL at 09:47

## 2020-03-15 RX ADMIN — ACETAMINOPHEN 650 MG: 325 TABLET, FILM COATED ORAL at 05:58

## 2020-03-15 RX ADMIN — ASPIRIN 81 MG 81 MG: 81 TABLET ORAL at 08:37

## 2020-03-15 RX ADMIN — DIPHENHYDRAMINE HYDROCHLORIDE 50 MG: 25 TABLET ORAL at 20:10

## 2020-03-15 RX ADMIN — URSODIOL 300 MG: 300 CAPSULE ORAL at 20:10

## 2020-03-15 RX ADMIN — TIZANIDINE 4 MG: 4 TABLET ORAL at 20:11

## 2020-03-15 RX ADMIN — INSULIN LISPRO 2 UNITS: 100 INJECTION, SOLUTION INTRAVENOUS; SUBCUTANEOUS at 20:12

## 2020-03-15 RX ADMIN — METOPROLOL SUCCINATE 50 MG: 50 TABLET, EXTENDED RELEASE ORAL at 20:10

## 2020-03-15 RX ADMIN — SODIUM CHLORIDE, PRESERVATIVE FREE 10 ML: 5 INJECTION INTRAVENOUS at 20:11

## 2020-03-15 RX ADMIN — INSULIN LISPRO 2 UNITS: 100 INJECTION, SOLUTION INTRAVENOUS; SUBCUTANEOUS at 11:54

## 2020-03-15 ASSESSMENT — PAIN - FUNCTIONAL ASSESSMENT
PAIN_FUNCTIONAL_ASSESSMENT: ACTIVITIES ARE NOT PREVENTED
PAIN_FUNCTIONAL_ASSESSMENT: ACTIVITIES ARE NOT PREVENTED

## 2020-03-15 ASSESSMENT — PAIN DESCRIPTION - PAIN TYPE
TYPE: ACUTE PAIN

## 2020-03-15 ASSESSMENT — PAIN DESCRIPTION - DESCRIPTORS
DESCRIPTORS: DISCOMFORT;SORE;TENDER
DESCRIPTORS: ACHING;DISCOMFORT;HEADACHE
DESCRIPTORS: ACHING;HEADACHE;DISCOMFORT

## 2020-03-15 ASSESSMENT — PAIN DESCRIPTION - LOCATION
LOCATION: HEAD
LOCATION: CHEST;SHOULDER
LOCATION: HEAD

## 2020-03-15 ASSESSMENT — PAIN DESCRIPTION - ONSET: ONSET: SUDDEN

## 2020-03-15 ASSESSMENT — PAIN SCALES - GENERAL
PAINLEVEL_OUTOF10: 0
PAINLEVEL_OUTOF10: 5
PAINLEVEL_OUTOF10: 5
PAINLEVEL_OUTOF10: 0
PAINLEVEL_OUTOF10: 6

## 2020-03-15 ASSESSMENT — PAIN DESCRIPTION - ORIENTATION: ORIENTATION: LEFT

## 2020-03-15 ASSESSMENT — PAIN DESCRIPTION - FREQUENCY
FREQUENCY: INTERMITTENT
FREQUENCY: INTERMITTENT

## 2020-03-15 ASSESSMENT — PAIN DESCRIPTION - PROGRESSION: CLINICAL_PROGRESSION: GRADUALLY IMPROVING

## 2020-03-15 NOTE — PROGRESS NOTES
Subjective:  [place] was seen and examined at bedside today. The patient's questions were answered and tests were reviewed. There were no new problems reported overnight. Patient is tolerating current diet. No further cp    A complete review of systems and social history was completed on admission and remains unchanged unless otherwise noted    Scheduled Meds:   metoprolol succinate  50 mg Oral BID    FLUoxetine  20 mg Oral Daily    triamterene-hydrochlorothiazide  2 tablet Oral Daily    pantoprazole  40 mg Oral Nightly    ursodiol  300 mg Oral BID    dicyclomine  20 mg Oral BID    diphenhydrAMINE  50 mg Oral Nightly    tiZANidine  4 mg Oral Nightly    sodium chloride flush  10 mL Intravenous 2 times per day    aspirin  81 mg Oral Daily    insulin lispro  0-12 Units Subcutaneous TID WC    insulin lispro  0-6 Units Subcutaneous Nightly    insulin glargine  50 Units Subcutaneous Nightly    enoxaparin  1 mg/kg Subcutaneous BID    influenza virus vaccine  0.5 mL Intramuscular Prior to discharge     Continuous Infusions:   dextrose       PRN Meds:sodium chloride flush, acetaminophen **OR** acetaminophen, polyethylene glycol, promethazine **OR** ondansetron, nitroGLYCERIN, glucose, dextrose, glucagon (rDNA), dextrose, albuterol, perflutren lipid microspheres    Objective:  /76   Pulse 79   Temp 98.1 °F (36.7 °C) (Oral)   Resp 16   Ht 5' 3\" (1.6 m)   Wt 222 lb 3.2 oz (100.8 kg)   SpO2 95%   BMI 39.36 kg/m²   In: 240 [P.O.:240]  Out: -    In: 240   Out: -      AAO x 3, currently in NAD  RRR, pos S1, S2  CTA bilaterally, no wheeze, rales or rhonchi  bowel sounds present, nontender, nondistended  No clubbing, cyanosis, or edema  No neuro changes   No obvious rashes or lesions.     Recent Labs     03/13/20  2335 03/15/20  0708   WBC 14.0* 10.1   HGB 14.0 12.9    387     Recent Labs     03/13/20 2335 03/15/20  0708    140   K 4.3 3.5   CL 94* 100   CO2 22 25   BUN 22* 23* CREATININE 1.1* 0.9   GLUCOSE 364* 110*     No results for input(s): BILITOT, ALKPHOS, AST, ALT in the last 72 hours. No results for input(s): INR in the last 72 hours. Invalid input(s): PT  Recent Labs     03/14/20  1150 03/14/20  1502 03/14/20  1814   TROPONINI 0.15* 0.26* 0.39*         Xr Chest Portable    Result Date: 3/14/2020  LOCATION: 200 EXAM: XR CHEST PORTABLE COMPARISON: 2013 HISTORY: cough, shortness of breath cough, shortness of breath TECHNIQUE: Single frontal view of the chest was obtained. FINDINGS:  SUPPORT DEVICES: None. LUNGS: No acute infiltrate. Elevation right hemidiaphragm. PLEURA: No effusions or pneumothorax. LUNG VOLUMES: Satisfactory inspirator effort. MEDIASTINAL STRUCTURES: No lymphadenopathy. HEART SIZE: Normal. BONES AND SOFT TISSUES: No fracture or soft tissue abnormality. 1. No acute findings     Cta Chest W Contrast    Result Date: 3/14/2020  LOCATION:200 EXAM: CTA CHEST W CONTRAST COMPARISON: None HISTORY:  chest pain, shortness of breath, r/o PE chest pain, shortness of breath, r/o PE TECHNIQUE: Axial CT images are obtained of the chest.  Coronal and sagittal reconstructions were obtained with 3-D maximum intensity projection (MIP) reconstructed images. These were performed on a separate workstation with concurrent supervision for detailed evaluation of the pulmonary arteries. CONTRAST: 80 mL Isovue-370 intravenous contrast. FINDINGS: SUPPORT DEVICES: None LUNGS/CENTRAL AIRWAYS/PLEURA: The lungs are clear. No nodules or opacities are identified. Central airways are patent. PULMONARY ARTERIES: Evaluation is adequate for pulmonary embolus. No filling defects identified to the subsegmental level. HEART/PERICARDIUM/GREAT VESSELS: Cardiac size is normal.  There is no pericardial effusion. The great vessels of the chest are normal in caliber. LYMPH NODES: No thoracic adenopathy by size criteria.  NECK BASE/CHEST WALL/DIAPHRAGM: No soft tissue lesions or diaphragmatic

## 2020-03-15 NOTE — PLAN OF CARE
Problem: Infection:  Goal: Will remain free from infection  Description: Will remain free from infection  Outcome: Met This Shift     Problem: Safety:  Goal: Free from accidental physical injury  Description: Free from accidental physical injury  Outcome: Met This Shift     Problem: Safety:  Goal: Free from intentional harm  Description: Free from intentional harm  Outcome: Met This Shift     Problem: Daily Care:  Goal: Daily care needs are met  Description: Daily care needs are met  Outcome: Met This Shift

## 2020-03-15 NOTE — PLAN OF CARE
Problem: Pain:  Goal: Pain level will decrease  Description: Pain level will decrease  3/15/2020 1252 by Melissa Husain RN  Outcome: Met This Shift     Problem: Infection:  Goal: Will remain free from infection  Description: Will remain free from infection  3/15/2020 1252 by Melissa Husain RN  Outcome: Met This Shift     Problem: Safety:  Goal: Free from accidental physical injury  Description: Free from accidental physical injury  3/15/2020 1252 by Melissa Husain RN  Outcome: Met This Shift

## 2020-03-15 NOTE — PROGRESS NOTES
dicyclomine  20 mg Oral BID    diphenhydrAMINE  50 mg Oral Nightly    tiZANidine  4 mg Oral Nightly    sodium chloride flush  10 mL Intravenous 2 times per day    aspirin  81 mg Oral Daily    insulin lispro  0-12 Units Subcutaneous TID WC    insulin lispro  0-6 Units Subcutaneous Nightly    insulin glargine  50 Units Subcutaneous Nightly    enoxaparin  1 mg/kg Subcutaneous BID    influenza virus vaccine  0.5 mL Intramuscular Prior to discharge       IV Infusions (if any):   dextrose           PHYSICAL EXAM:     CONSTITUTIONAL:   /76   Pulse 79   Temp 98.1 °F (36.7 °C) (Oral)   Resp 16   Ht 5' 3\" (1.6 m)   Wt 222 lb 3.2 oz (100.8 kg)   SpO2 95%   BMI 39.36 kg/m²   Pulse  Av.8  Min: 75  Max: 99  Systolic (47XSC), XOQ:575 , Min:95 , ESTELLA:169    Diastolic (92ECL), NQC:74, Min:51, Max:95    In general, this is a well developed, well nourished who appears stated age. awake, alert, cooperative, no apparent distress  HEENT: eyes -conjunctivae pink,  Throat - Oral mucosa pink and moist.   Neck-  no stridor, no carotid bruit. no jugular venous distention   RESPIRATORY: Chest symmetrical and non-tender to palpation. No accessory muscle use. Lung auscultation - clear to auscultation except few rhonchi  CARDIOVASCULAR:     Heart Inspection shows no noted pulsations  Heart Palpation - no palpable thrills   Heart Ausculation - Regular rate and rhythm, 1/6 systolic murmur, No s3 or rub. No lower extremity edema, no varicosities. Distal pulses palpable, no clubbing or cyanosis   ABDOMEN: Soft, nontender, nondistended. Bowel sounds present  MS: good muscle strength and tone.   : Deferred  Rectal Exam: Deferred  SKIN: warm and dry no statis dermatitis or ulcers   NEURO / PSYCH: oriented to person, place           ASSESSMENT/PLAN:     NSTEMI (non-ST elevated myocardial infarction) (HCC) - Continue ASA, Metoprolol, Lovenox - Risk benefits of Cardiac cath discussed, agreeable for cath/PTCA-Stent or CABG

## 2020-03-15 NOTE — PLAN OF CARE
Problem: Pain:  Goal: Pain level will decrease  Description: Pain level will decrease  3/14/2020 2340 by Paola Brewer RN  Outcome: Met This Shift     Problem: Pain:  Goal: Control of acute pain  Description: Control of acute pain  3/14/2020 2340 by Paola Brewer RN  Outcome: Met This Shift     Problem: Pain:  Goal: Control of chronic pain  Description: Control of chronic pain  3/14/2020 2340 by Paola Brewer RN  Outcome: Met This Shift

## 2020-03-16 ENCOUNTER — HOSPITAL ENCOUNTER (OUTPATIENT)
Age: 50
Setting detail: OBSERVATION
Discharge: HOME | End: 2020-03-17
Attending: INTERNAL MEDICINE | Admitting: INTERNAL MEDICINE
Payer: COMMERCIAL

## 2020-03-16 VITALS
DIASTOLIC BLOOD PRESSURE: 74 MMHG | HEART RATE: 88 BPM | BODY MASS INDEX: 39.28 KG/M2 | TEMPERATURE: 98.1 F | HEIGHT: 63 IN | OXYGEN SATURATION: 94 % | SYSTOLIC BLOOD PRESSURE: 122 MMHG | RESPIRATION RATE: 16 BRPM | WEIGHT: 221.7 LBS

## 2020-03-16 PROBLEM — I25.10 CAD IN NATIVE ARTERY: Status: ACTIVE | Noted: 2020-03-16

## 2020-03-16 LAB
ABO/RH: NORMAL
ANTIBODY SCREEN: NORMAL
LV EF: 55 %
LVEF MODALITY: NORMAL
METER GLUCOSE: 122 MG/DL (ref 74–99)
METER GLUCOSE: 219 MG/DL (ref 74–99)
METER GLUCOSE: 222 MG/DL (ref 74–99)
POC ACT LR: 188 SECONDS
POC ACT LR: 232 SECONDS
POC ACT LR: 308 SECONDS
POC ACT LR: >400 SECONDS

## 2020-03-16 PROCEDURE — C1894 INTRO/SHEATH, NON-LASER: HCPCS

## 2020-03-16 PROCEDURE — 2709999900 HC NON-CHARGEABLE SUPPLY

## 2020-03-16 PROCEDURE — 93454 CORONARY ARTERY ANGIO S&I: CPT | Performed by: INTERNAL MEDICINE

## 2020-03-16 PROCEDURE — 6370000000 HC RX 637 (ALT 250 FOR IP): Performed by: INTERNAL MEDICINE

## 2020-03-16 PROCEDURE — 6370000000 HC RX 637 (ALT 250 FOR IP)

## 2020-03-16 PROCEDURE — 6360000002 HC RX W HCPCS

## 2020-03-16 PROCEDURE — C1769 GUIDE WIRE: HCPCS

## 2020-03-16 PROCEDURE — 86850 RBC ANTIBODY SCREEN: CPT

## 2020-03-16 PROCEDURE — 2580000003 HC RX 258: Performed by: INTERNAL MEDICINE

## 2020-03-16 PROCEDURE — 85347 COAGULATION TIME ACTIVATED: CPT

## 2020-03-16 PROCEDURE — 36415 COLL VENOUS BLD VENIPUNCTURE: CPT

## 2020-03-16 PROCEDURE — G0378 HOSPITAL OBSERVATION PER HR: HCPCS

## 2020-03-16 PROCEDURE — 86900 BLOOD TYPING SEROLOGIC ABO: CPT

## 2020-03-16 PROCEDURE — G0379 DIRECT REFER HOSPITAL OBSERV: HCPCS

## 2020-03-16 PROCEDURE — 90686 IIV4 VACC NO PRSV 0.5 ML IM: CPT | Performed by: INTERNAL MEDICINE

## 2020-03-16 PROCEDURE — 2500000003 HC RX 250 WO HCPCS

## 2020-03-16 PROCEDURE — 92928 PRQ TCAT PLMT NTRAC ST 1 LES: CPT | Performed by: INTERNAL MEDICINE

## 2020-03-16 PROCEDURE — 82962 GLUCOSE BLOOD TEST: CPT

## 2020-03-16 PROCEDURE — 6360000002 HC RX W HCPCS: Performed by: INTERNAL MEDICINE

## 2020-03-16 PROCEDURE — G0008 ADMIN INFLUENZA VIRUS VAC: HCPCS | Performed by: INTERNAL MEDICINE

## 2020-03-16 PROCEDURE — 93306 TTE W/DOPPLER COMPLETE: CPT

## 2020-03-16 PROCEDURE — C1887 CATHETER, GUIDING: HCPCS

## 2020-03-16 PROCEDURE — C1874 STENT, COATED/COV W/DEL SYS: HCPCS

## 2020-03-16 PROCEDURE — 86901 BLOOD TYPING SEROLOGIC RH(D): CPT

## 2020-03-16 RX ORDER — ALBUTEROL SULFATE 90 UG/1
2 AEROSOL, METERED RESPIRATORY (INHALATION) EVERY 6 HOURS PRN
Status: DISCONTINUED | OUTPATIENT
Start: 2020-03-16 | End: 2020-03-17 | Stop reason: HOSPADM

## 2020-03-16 RX ORDER — SODIUM CHLORIDE 0.9 % (FLUSH) 0.9 %
10 SYRINGE (ML) INJECTION EVERY 12 HOURS SCHEDULED
Status: DISCONTINUED | OUTPATIENT
Start: 2020-03-16 | End: 2020-03-17 | Stop reason: HOSPADM

## 2020-03-16 RX ORDER — TIZANIDINE 4 MG/1
4 TABLET ORAL NIGHTLY
Status: DISCONTINUED | OUTPATIENT
Start: 2020-03-16 | End: 2020-03-17 | Stop reason: HOSPADM

## 2020-03-16 RX ORDER — DEXTROSE MONOHYDRATE 50 MG/ML
100 INJECTION, SOLUTION INTRAVENOUS PRN
Status: DISCONTINUED | OUTPATIENT
Start: 2020-03-16 | End: 2020-03-17 | Stop reason: HOSPADM

## 2020-03-16 RX ORDER — SODIUM CHLORIDE 9 MG/ML
INJECTION, SOLUTION INTRAVENOUS CONTINUOUS
Status: DISCONTINUED | OUTPATIENT
Start: 2020-03-16 | End: 2020-03-17 | Stop reason: HOSPADM

## 2020-03-16 RX ORDER — SODIUM CHLORIDE 0.9 % (FLUSH) 0.9 %
10 SYRINGE (ML) INJECTION PRN
Status: DISCONTINUED | OUTPATIENT
Start: 2020-03-16 | End: 2020-03-17 | Stop reason: HOSPADM

## 2020-03-16 RX ORDER — DICYCLOMINE HYDROCHLORIDE 10 MG/1
20 CAPSULE ORAL NIGHTLY
Status: DISCONTINUED | OUTPATIENT
Start: 2020-03-16 | End: 2020-03-17 | Stop reason: HOSPADM

## 2020-03-16 RX ORDER — SODIUM CHLORIDE 9 MG/ML
INJECTION, SOLUTION INTRAVENOUS ONCE
Status: COMPLETED | OUTPATIENT
Start: 2020-03-16 | End: 2020-03-16

## 2020-03-16 RX ORDER — METOPROLOL SUCCINATE 50 MG/1
50 TABLET, EXTENDED RELEASE ORAL 2 TIMES DAILY
Status: DISCONTINUED | OUTPATIENT
Start: 2020-03-16 | End: 2020-03-17 | Stop reason: HOSPADM

## 2020-03-16 RX ORDER — FLUOXETINE HYDROCHLORIDE 20 MG/1
20 CAPSULE ORAL DAILY
Status: DISCONTINUED | OUTPATIENT
Start: 2020-03-16 | End: 2020-03-17 | Stop reason: HOSPADM

## 2020-03-16 RX ORDER — NICOTINE POLACRILEX 4 MG
15 LOZENGE BUCCAL PRN
Status: DISCONTINUED | OUTPATIENT
Start: 2020-03-16 | End: 2020-03-17 | Stop reason: HOSPADM

## 2020-03-16 RX ORDER — FLUTICASONE PROPIONATE 50 MCG
2 SPRAY, SUSPENSION (ML) NASAL PRN
Status: DISCONTINUED | OUTPATIENT
Start: 2020-03-16 | End: 2020-03-17 | Stop reason: HOSPADM

## 2020-03-16 RX ORDER — ALPRAZOLAM 0.25 MG/1
0.5 TABLET ORAL EVERY 4 HOURS PRN
Status: DISCONTINUED | OUTPATIENT
Start: 2020-03-16 | End: 2020-03-17 | Stop reason: HOSPADM

## 2020-03-16 RX ORDER — DIPHENHYDRAMINE HCL 25 MG
50 TABLET ORAL NIGHTLY
Status: DISCONTINUED | OUTPATIENT
Start: 2020-03-16 | End: 2020-03-17 | Stop reason: HOSPADM

## 2020-03-16 RX ORDER — DEXTROSE MONOHYDRATE 25 G/50ML
12.5 INJECTION, SOLUTION INTRAVENOUS PRN
Status: DISCONTINUED | OUTPATIENT
Start: 2020-03-16 | End: 2020-03-17 | Stop reason: HOSPADM

## 2020-03-16 RX ORDER — ASPIRIN 81 MG/1
81 TABLET, CHEWABLE ORAL DAILY
Status: DISCONTINUED | OUTPATIENT
Start: 2020-03-17 | End: 2020-03-17 | Stop reason: HOSPADM

## 2020-03-16 RX ORDER — INSULIN GLARGINE 100 [IU]/ML
60 INJECTION, SOLUTION SUBCUTANEOUS NIGHTLY
Status: DISCONTINUED | OUTPATIENT
Start: 2020-03-16 | End: 2020-03-17 | Stop reason: HOSPADM

## 2020-03-16 RX ORDER — ACETAMINOPHEN 325 MG/1
650 TABLET ORAL EVERY 4 HOURS PRN
Status: DISCONTINUED | OUTPATIENT
Start: 2020-03-16 | End: 2020-03-17 | Stop reason: HOSPADM

## 2020-03-16 RX ORDER — PANTOPRAZOLE SODIUM 40 MG/1
40 TABLET, DELAYED RELEASE ORAL NIGHTLY
Status: DISCONTINUED | OUTPATIENT
Start: 2020-03-16 | End: 2020-03-17 | Stop reason: HOSPADM

## 2020-03-16 RX ADMIN — DIPHENHYDRAMINE HCL 50 MG: 25 TABLET ORAL at 20:50

## 2020-03-16 RX ADMIN — TIZANIDINE 4 MG: 4 TABLET ORAL at 20:51

## 2020-03-16 RX ADMIN — PANTOPRAZOLE SODIUM 40 MG: 40 TABLET, DELAYED RELEASE ORAL at 20:50

## 2020-03-16 RX ADMIN — SODIUM CHLORIDE: 9 INJECTION, SOLUTION INTRAVENOUS at 11:29

## 2020-03-16 RX ADMIN — INSULIN GLARGINE 60 UNITS: 100 INJECTION, SOLUTION SUBCUTANEOUS at 20:58

## 2020-03-16 RX ADMIN — TICAGRELOR 90 MG: 90 TABLET ORAL at 20:50

## 2020-03-16 RX ADMIN — METOPROLOL SUCCINATE 50 MG: 50 TABLET, EXTENDED RELEASE ORAL at 20:50

## 2020-03-16 RX ADMIN — DICYCLOMINE HYDROCHLORIDE 20 MG: 10 CAPSULE ORAL at 20:51

## 2020-03-16 RX ADMIN — INSULIN LISPRO 6 UNITS: 100 INJECTION, SOLUTION INTRAVENOUS; SUBCUTANEOUS at 17:44

## 2020-03-16 RX ADMIN — SODIUM CHLORIDE, PRESERVATIVE FREE 10 ML: 5 INJECTION INTRAVENOUS at 20:52

## 2020-03-16 RX ADMIN — INFLUENZA A VIRUS A/BRISBANE/02/2018 IVR-190 (H1N1) ANTIGEN (PROPIOLACTONE INACTIVATED), INFLUENZA A VIRUS A/KANSAS/14/2017 X-327 (H3N2) ANTIGEN (PROPIOLACTONE INACTIVATED), INFLUENZA B VIRUS B/MARYLAND/15/2016 ANTIGEN (PROPIOLACTONE INACTIVATED), INFLUENZA B VIRUS B/PHUKET/3073/2013 BVR-1B ANTIGEN (PROPIOLACTONE INACTIVATED) 0.5 ML: 15; 15; 15; 15 INJECTION, SUSPENSION INTRAMUSCULAR at 10:13

## 2020-03-16 ASSESSMENT — PAIN SCALES - GENERAL
PAINLEVEL_OUTOF10: 0

## 2020-03-16 NOTE — PROCEDURES
[address]                  [address] [place],  [facility]                            CARDIAC CATHETERIZATION    PATIENT NAME: Joan Coughlin                    :        1970  MED REC NO:   [id number]                            ROOM:       6317  ACCOUNT NO:   [de-identified]                           ADMIT DATE: 2020  PROVIDER:     Dany Holter, MD    DATE OF PROCEDURE:  2020    PROCEDURES:  1. Coronary angiography. 2.  Percutaneous coronary intervention with a deployment of 2.5 x 22 mm  Resolute [place] drug-eluting stent in the second OM branch. 3.  Conscious sedation using Versed and fentanyl. INDICATION:  AUC indication of 4, AUC score of 8. INDICATION FOR PCI:  16 and 7. DESCRIPTION OF PROCEDURE:  After the appropriate informed consent, the  right wrist area was prepped and draped in the usual sterile fashion. A  timeout was called. The right wrist area was locally anesthetized with  2 mL of 2% lidocaine. Haily Romero test was normal.  A 21-gauge needle was  used to access the right radial artery. A 6-Citizen of [place] introducer sheath  was used to cannulate the right radial artery. A 6-Citizen of [place] JL-3.5 and  6-Citizen of [place] JL4 catheters were used for coronary angiography in multiple  projections. ANGIOGRAPHIC FINDINGS:  1. The left main coronary artery arises normally from the left sinus of  Valsalva. It is a mid-sized vessel without any significant disease. It  bifurcates into left anterior descending artery and left circumflex  artery. 2.  The left anterior descending artery extends down to the apex. It is  a mid-sized vessel, gives off two mid-sized diagonal branches and a  couple of small ones. The LAD and its branches have no significant  disease. 3.  The left circumflex artery is a mid-sized vessel, gives off first  and second small OM branches. The third and the fourth are mid size to  large.   The fourth OM branch has 90% mid mL.    IMPRESSION:  1. Significant disease involving the fourth OM branch with successful  deployment of 2.5 x 22 mm [place] Resolute drug-eluting stent with 0%  residual stenosis and JULIA-3 flow distally. Pre-PCI JULIA flow was 2. Post-PCI JULIA flow was 3.  2.  No significant disease involving the LAD or the RCA. RECOMMENDATIONS:  1. Aspirin for life. 2.  Brilinta for at least a year, preferably longer. 3.  Aggressive coronary artery disease risk factor modifications. 4.  The patient will be admitted to the hospital for overnight  observation and IV hydration.         Scotty Stephen MD    D: 03/16/2020 14:46:26       T: 03/16/2020 15:47:47     [email or web address]  Job#: [id number]     Doc#: [id number]    CC:  Hermes Johnson MD

## 2020-03-16 NOTE — DISCHARGE SUMMARY
Discharge Summary    Sheryl Burr  :  1970  MRN:  [id number]    Admit date:  3/13/2020  Discharge date:  3/16/2020    Admitting Physician:    Berenice Espinoza MD    Discharge Diagnoses:    Principal Problem:    NSTEMI (non-ST elevated myocardial infarction) [facility])  Active Problems:    Hyperlipidemia    MTHFR mutation ([facility] [address]    Diabetes mellitus ([facility] [address]    Chest pain  Resolved Problems:    * No resolved hospital problems. *    Consults:   IP CONSULT TO INTERNAL MEDICINE  IP CONSULT TO CARDIOLOGY     Condition at Discharge:  Stable    HPI/Hospital Course: 48 y.o. female who presents with cough, sob and cp. Pt initially developed a cough about 2 weeks so she came to office and was treated for bronchitis with abx and steroids. Pt symptoms continued even with treatment but then pt developed cp primarily when coughing so she came to the ED. W/u in the ED was negative including CTA chest but due to pts risk factors it was decided to admit the pt for further cardiac evaluation. Pt risk factors include strong fam hx of CAD with a brother having cabg at a young age. Pt also has a hx of uncontrolled DM, HTN and Hyperlipidemia. Pts cardiac enzymes were cycled and increased with no further c/o cp so cardiac cath was scheduled for today at [facility]    Physical Exam  /74   Pulse 88   Temp 98.1 °F (36.7 °C) (Oral)   Resp 16   Ht 5' 3\" (1.6 m)   Wt 221 lb 11.2 oz (100.6 kg)   SpO2 94%   BMI 39.27 kg/m²   RRR   CTA bilaterally, no wheeze, rales or rhonchi   bowel sounds present, nontender, nondistended   No clubbing, cyanosis, or edema   Neuro - at baseline     Pertinent Results during this Hospital Course: Fl Ercp Biliary And Pancreatic S&i    Result Date: 2020  READING LOCATION: 200 HISTORY: Pain. TECHNIQUE: Intraoperative C-arm fluoroscopy was provided to gastroenterology during an ERCP procedure. Multiple images were obtained. FLUOROSCOPY TIME: 243.2 seconds of fluoroscopy time was recorded.  15 images were obtained. FINDINGS: Contrast opacifies the intra and extrahepatic biliary ducts. Please see the intraoperative report. 1. Intraoperative C-arm fluoroscopy was provided to gastroenterology. Xr Chest Portable    Result Date: 3/14/2020  LOCATION: 200 EXAM: XR CHEST PORTABLE COMPARISON: 2013 HISTORY: cough, shortness of breath cough, shortness of breath TECHNIQUE: Single frontal view of the chest was obtained. FINDINGS:  SUPPORT DEVICES: None. LUNGS: No acute infiltrate. Elevation right hemidiaphragm. PLEURA: No effusions or pneumothorax. LUNG VOLUMES: Satisfactory inspirator effort. MEDIASTINAL STRUCTURES: No lymphadenopathy. HEART SIZE: Normal. BONES AND SOFT TISSUES: No fracture or soft tissue abnormality. 1. No acute findings     Cta Chest W Contrast    Result Date: 3/14/2020  LOCATION:200 EXAM: CTA CHEST W CONTRAST COMPARISON: None HISTORY:  chest pain, shortness of breath, r/o PE chest pain, shortness of breath, r/o PE TECHNIQUE: Axial CT images are obtained of the chest.  Coronal and sagittal reconstructions were obtained with 3-D maximum intensity projection (MIP) reconstructed images. These were performed on a separate workstation with concurrent supervision for detailed evaluation of the pulmonary arteries. CONTRAST: 80 mL Isovue-370 intravenous contrast. FINDINGS: SUPPORT DEVICES: None LUNGS/CENTRAL AIRWAYS/PLEURA: The lungs are clear. No nodules or opacities are identified. Central airways are patent. PULMONARY ARTERIES: Evaluation is adequate for pulmonary embolus. No filling defects identified to the subsegmental level. HEART/PERICARDIUM/GREAT VESSELS: Cardiac size is normal.  There is no pericardial effusion. The great vessels of the chest are normal in caliber. LYMPH NODES: No thoracic adenopathy by size criteria. NECK BASE/CHEST WALL/DIAPHRAGM: No soft tissue lesions or diaphragmatic abnormality. UPPER ABDOMEN: Limited images through the upper abdomen are unremarkable.  OSSEOUS STRUCTURES: No suspicious lytic or blastic lesions. No fractures. 1. No evidence of pulmonary embolism.     Lab Results   Component Value Date    WBC 10.1 03/15/2020    HGB 12.9 03/15/2020    HCT 38.6 03/15/2020    MCV 83.9 03/15/2020     03/15/2020     03/15/2020    K 3.5 03/15/2020    K 4.3 01/02/2020     03/15/2020    CO2 25 03/15/2020    BUN 23 03/15/2020    CREATININE 0.9 03/15/2020    CALCIUM 9.5 03/15/2020    PHOS 2.7 01/16/2019    ALKPHOS 240 01/05/2020     01/05/2020    AST 35 01/05/2020    BILITOT 0.7 01/05/2020    BILIDIR 2.1 01/02/2020    LABALBU 2.9 01/05/2020    LABALBU 4.0 10/27/2011    LDLCALC - 03/15/2020    TRIG 714 03/15/2020     Lab Results   Component Value Date    INR 0.9 02/24/2020    INR 1.0 01/02/2020    INR 1.0 01/18/2019     Discharge Medications:    Discharge Medication List as of 3/16/2020  9:24 AM           Details   enoxaparin (LOVENOX) 100 MG/ML injection Inject 1 mL into the skin 2 times daily, Disp-1 Syringe, R-0NO PRINT              Details   diphenhydrAMINE (BENADRYL) 50 MG capsule Take 50 mg by mouth nightlyHistorical Med      dicyclomine (BENTYL) 20 MG tablet TAKE ONE TABLET BY MOUTH TWO TIMES A DAYHistorical Med      ursodiol (ACTIGALL) 300 MG capsule Take 1 capsule by mouth 2 times daily, Disp-60 capsule, R-0Print      albuterol sulfate HFA (PROVENTIL HFA) 108 (90 Base) MCG/ACT inhaler Inhale 2 puffs into the lungs every 6 hours as needed for Wheezing, Disp-1 Inhaler, R-0Print      insulin lispro (HUMALOG) 100 UNIT/ML injection vial Inject 8 Units into the skin 3 times daily (with meals), Disp-5 vial, R-0Normal      Insulin Degludec (TRESIBA FLEXTOUCH SC) Inject 60 Units into the skin nightly Took 30 units   Vasaglar 1/23/2020 in place of trisibaHistorical Med      TiZANidine HCl (ZANAFLEX PO) Take 4 mg by mouth nightly Historical Med      fluticasone (FLONASE) 50 MCG/ACT nasal spray 2 sprays by Nasal route daily 2 sprays in each nostril daily,

## 2020-03-16 NOTE — PLAN OF CARE
Problem: Pain:  Goal: Control of acute pain  Description: Control of acute pain  3/16/2020 0115 by Azul Strickland RN  Outcome: Met This Shift     Problem: Infection:  Goal: Will remain free from infection  Description: Will remain free from infection  3/16/2020 0115 by Azul Strickland RN  Outcome: Met This Shift     Problem: Safety:  Goal: Free from accidental physical injury  Description: Free from accidental physical injury  3/16/2020 0115 by Azul Strickland RN  Outcome: Met This Shift     Problem: Safety:  Goal: Free from intentional harm  Description: Free from intentional harm  3/16/2020 0115 by Azul Strickland RN  Outcome: Met This Shift     Problem: Daily Care:  Goal: Daily care needs are met  Description: Daily care needs are met  3/16/2020 0115 by Azul Strickland RN  Outcome: Met This Shift

## 2020-03-17 VITALS
SYSTOLIC BLOOD PRESSURE: 131 MMHG | HEART RATE: 91 BPM | TEMPERATURE: 97 F | DIASTOLIC BLOOD PRESSURE: 80 MMHG | RESPIRATION RATE: 18 BRPM | OXYGEN SATURATION: 97 % | BODY MASS INDEX: 38.62 KG/M2 | WEIGHT: 218 LBS

## 2020-03-17 LAB
ANION GAP SERPL CALCULATED.3IONS-SCNC: 11 MMOL/L (ref 7–16)
BUN BLDV-MCNC: 16 MG/DL (ref 6–20)
CALCIUM SERPL-MCNC: 8.8 MG/DL (ref 8.6–10.2)
CHLORIDE BLD-SCNC: 100 MMOL/L (ref 98–107)
CO2: 23 MMOL/L (ref 22–29)
CREAT SERPL-MCNC: 0.9 MG/DL (ref 0.5–1)
GFR AFRICAN AMERICAN: >60
GFR NON-AFRICAN AMERICAN: >60 ML/MIN/1.73
GLUCOSE BLD-MCNC: 149 MG/DL (ref 74–99)
HCT VFR BLD CALC: 37.5 % (ref 34–48)
HEMOGLOBIN: 12.7 G/DL (ref 11.5–15.5)
MCH RBC QN AUTO: 28.2 PG (ref 26–35)
MCHC RBC AUTO-ENTMCNC: 33.9 % (ref 32–34.5)
MCV RBC AUTO: 83.1 FL (ref 80–99.9)
METER GLUCOSE: 253 MG/DL (ref 74–99)
PDW BLD-RTO: 13.3 FL (ref 11.5–15)
PLATELET # BLD: 306 E9/L (ref 130–450)
PMV BLD AUTO: 9.9 FL (ref 7–12)
POTASSIUM SERPL-SCNC: 3.5 MMOL/L (ref 3.5–5)
RBC # BLD: 4.51 E12/L (ref 3.5–5.5)
SODIUM BLD-SCNC: 134 MMOL/L (ref 132–146)
WBC # BLD: 6.8 E9/L (ref 4.5–11.5)

## 2020-03-17 PROCEDURE — 80048 BASIC METABOLIC PNL TOTAL CA: CPT

## 2020-03-17 PROCEDURE — 2580000003 HC RX 258: Performed by: INTERNAL MEDICINE

## 2020-03-17 PROCEDURE — G0378 HOSPITAL OBSERVATION PER HR: HCPCS

## 2020-03-17 PROCEDURE — 82962 GLUCOSE BLOOD TEST: CPT

## 2020-03-17 PROCEDURE — 99217 PR OBSERVATION CARE DISCHARGE MANAGEMENT: CPT | Performed by: INTERNAL MEDICINE

## 2020-03-17 PROCEDURE — 85027 COMPLETE CBC AUTOMATED: CPT

## 2020-03-17 PROCEDURE — 6370000000 HC RX 637 (ALT 250 FOR IP): Performed by: INTERNAL MEDICINE

## 2020-03-17 PROCEDURE — 93005 ELECTROCARDIOGRAM TRACING: CPT | Performed by: INTERNAL MEDICINE

## 2020-03-17 PROCEDURE — 36415 COLL VENOUS BLD VENIPUNCTURE: CPT

## 2020-03-17 RX ADMIN — SODIUM CHLORIDE, PRESERVATIVE FREE 10 ML: 5 INJECTION INTRAVENOUS at 10:50

## 2020-03-17 RX ADMIN — TICAGRELOR 90 MG: 90 TABLET ORAL at 10:49

## 2020-03-17 RX ADMIN — ASPIRIN 81 MG 81 MG: 81 TABLET ORAL at 10:49

## 2020-03-17 RX ADMIN — INSULIN LISPRO 9 UNITS: 100 INJECTION, SOLUTION INTRAVENOUS; SUBCUTANEOUS at 18:54

## 2020-03-17 RX ADMIN — METOPROLOL SUCCINATE 50 MG: 50 TABLET, EXTENDED RELEASE ORAL at 10:49

## 2020-03-17 RX ADMIN — FLUOXETINE HYDROCHLORIDE 20 MG: 20 CAPSULE ORAL at 10:50

## 2020-03-17 ASSESSMENT — PAIN SCALES - GENERAL
PAINLEVEL_OUTOF10: 0
PAINLEVEL_OUTOF10: 0

## 2020-03-17 NOTE — CONSULTS
Met with patient and discussed that their physician has ordered a referral to our outpatient Phase II Cardiac Rehabilitation program. Reviewed the benefits of cardiac rehabilitation based on their diagnosis and personal risk factors. Patient demonstrates moderate interest in Cardiac Rehabilitation at this time. Cardiac Rehabilitation brochure provided to patient/family. The Cardiac Rehabilitation Program has been provided the patient's referral information and pertinent patient details and history. The patient may call [facility] at [phone] for additional information or questions. Contact information for [facility] and other choices close to the patient's residence have been provided in the discharge instructions so that the patient may call and schedule an appointment when cleared by their physician.  Thank you for the referral.

## 2020-03-17 NOTE — PROGRESS NOTES
CLINICAL PHARMACY NOTE: MEDS TO [address] Select Patient?: No  Total # of Prescriptions Filled: 1   The following medications were delivered to the patient:  · BRILINTA 90 MG   Total # of Interventions Completed: 3  Time Spent (min): 15    Additional Documentation:

## 2020-03-17 NOTE — PROGRESS NOTES
Nutrition Assessment    Type and Reason for Visit: Initial, Positive Nutrition Screen    Nutrition Recommendations: Continue current diet order. Will add ONS to promote optimal intakes. Nutrition Assessment: Pt at increased  nutritional risk r/t SOB, cough X 2 weeks PTA and s/p heart catheterization 3/16. Intakes and appetite are improving. Will add ONS to promote optimal intakes. Malnutrition Assessment:  · Malnutrition Status: No malnutrition  · Context: Acute illness or injury  · Findings of the 6 clinical characteristics of malnutrition (Minimum of 2 out of 6 clinical characteristics is required to make the diagnosis of moderate or severe Protein Calorie Malnutrition based on AND/ASPEN Guidelines):  1. Energy Intake-Greater than 75% of estimated energy requirement, (~2 weeks and since admit. )    2. Weight Loss-10% loss or greater(Intentional d/t diet and exercise.), (~4 months. Intentional d/t diet and exercise.)  3. Fat Loss-No significant subcutaneous fat loss,    4. Muscle Loss-No significant muscle mass loss,    5. Fluid Accumulation-No significant fluid accumulation,    6.  Strength-Not measured    Nutrition Risk Level: Low    Nutrient Needs:  · Estimated Daily Total Kcal: 2586-9830(15-17 kcal/kg)  · Estimated Daily Protein (g): 75-85(1.4-1.6 g/kg)  · Estimated Daily Total Fluid (ml/day): 8860-1601(1 ml/kcal)    Nutrition Diagnosis:   · Problem: Inadequate oral intake  · Etiology: related to Impaired respiratory function-inability to consume food(SOB, cough X 2 weeks PTA. )     Signs and symptoms:  as evidenced by Intake 50-75%, Diet history of poor intake    Objective Information:  · Nutrition-Focused Physical Findings: A&O, +I/Os, abd rounded, +bs, no edema noted.    · Wound Type: None  · Current Nutrition Therapies:  · Oral Diet Orders: Cardiac   · Oral Diet intake: 51-75%(Avg per doc flow. )  · Oral Nutrition Supplement (ONS) Orders: None  · ONS intake: (none)  · Anthropometric Measures:  · Ht:     · Current Body Wt: 218 lb (98.9 kg)(3/17 bed scale)  · Admission Body Wt: 221 lb 11 oz (100.6 kg)  · Usual Body Wt: 242 lb (109.8 kg)(Pt pt report 11/2019. Wt loss was d/t diet and exercise.)  · % Weight Change:  ,  No significant wt loss since admit. Per EMR -10% wt loss X 4 months was intentional. Pt reports recent poor appetite/intake. · Ideal Body Wt: 115 lb (52.2 kg), % Ideal Body 190%  · BMI Classification: BMI 35.0 - 39.9 Obese Class II    Nutrition Interventions:   Continue current diet, Start ONS  Continued Inpatient Monitoring, Education Initiated    Nutrition Evaluation:   · Evaluation: Goals set   · Goals: Intake >75% meals/ONS.      · Monitoring: Meal Intake, Supplement Intake, Diet Tolerance, Skin Integrity, I&O, Weight, Pertinent Labs, Monitor Bowel Function      Electronically signed by Zarina Pelletier RD, LD on 3/17/20 at 2:35 PM EDT    Contact Number:

## 2020-03-17 NOTE — DISCHARGE SUMMARY
Physician Discharge Summary     Patient ID:  Darling Reece  [id number]  48 y.o.  1970    Admit date: 3/16/2020    Discharge date and time: No discharge date for patient encounter. Admitting Physician: Saloni Toussaint MD     Discharge Jackie Hayes MD    Admission Diagnoses: CAD in native artery [I25.10]    Discharge Diagnoses: CAD    Admission Condition: fair    Discharged Condition: good    Indication for Admission: PCI to  branch    Hospital Course: Did well postprocedure, on the day of discharge, she was ambulating in the hallway without cardiac complaints, the wrist feels okay, little bit sore    Consults: none    Significant Diagnostic Studies: angiography: IMPRESSION:  1. Significant disease involving the fourth OM branch with successful  deployment of 2.5 x 22 mm [place] Resolute drug-eluting stent with 0%  residual stenosis and JULIA-3 flow distally. Pre-PCI JULIA flow was 2. Post-PCI JULIA flow was 3.  2.  No significant disease involving the LAD or the RCA.     RECOMMENDATIONS:  1. Aspirin for life. 2.  Brilinta for at least a year, preferably longer. 3.  Aggressive coronary artery disease risk factor modifications.   4.  The patient will be admitted to the hospital for overnight  observation and IV hydration    Outstanding Order Results     Date and Time Order Name Status Description    3/17/2020 1543 EKG 12 lead Preliminary               Discharge Exam:  /80   Pulse 91   Temp 97 °F (36.1 °C) (Temporal)   Resp 18   Wt 218 lb (98.9 kg)   SpO2 97%   BMI 38.62 kg/m²   CONSTITUTIONAL:  awake, alert, cooperative, no apparent distress, and appears stated age  HEAD:  normocepalic, without obvious abnormality, atraumatic  NECK:  Supple, symmetrical, trachea midline, no adenopathy, thyroid symmetric, not enlarged and no tenderness, skin normal  LUNGS:  No increased work of breathing, good air exchange, clear to auscultation bilaterally, no crackles or wheezing  CARDIOVASCULAR:  Normal triamterene-hydrochlorothiazide (MAXZIDE) 75-50 MG per tablet  Take 1 tablet by mouth daily. Activity: activity as tolerated  Diet: cardiac diet  Wound Care: none needed    Follow-up with Dr. Rose Nichols in 1 to 2 weeks, Dr. Maylin Hwathorne in 2 to 3 weeks. Note that 32 minutes was spent in preparing discharge papers, discussing discharge with patient and family, medication review.     [facility] Ross  3/17/2020  5:42 PM

## 2020-03-18 LAB
EKG ATRIAL RATE: 84 BPM
EKG P AXIS: 12 DEGREES
EKG P-R INTERVAL: 134 MS
EKG Q-T INTERVAL: 382 MS
EKG QRS DURATION: 90 MS
EKG QTC CALCULATION (BAZETT): 451 MS
EKG R AXIS: -39 DEGREES
EKG T AXIS: 93 DEGREES
EKG VENTRICULAR RATE: 84 BPM

## 2020-03-19 ENCOUNTER — TELEPHONE (OUTPATIENT)
Dept: CARDIOLOGY CLINIC | Age: 50
End: 2020-03-19

## 2020-03-19 NOTE — H&P
H&P    History of present illness: 49 y/o female who presents to the ED with a cough, shortness of breath and chest pain when she coughs. Patient states she had onset of a cough 2 weeks ago. The cough is minimally productive of sputum. She denies any fever. She states that she was seen by her PCP and prescribed a Zpak, however, she continues to cough. She has since been prescribed prednisone and an albuterol inhaler. She states that she is short of breath with minimal activity. She also reports chest and rib pain when she coughs; She was admitted and cardiology consutled for further recommendations; Denies any CP on exertion No PND or orthopnea; No nausea; Had chronic diarrhea; No palpitations or syncope. ROS: Review of rest of 10 systems negative except as mentioned above       --------------------------------------------- PAST HISTORY ---------------------------------------------  Past Medical History:  Past medical history of hypertension, Dyslipidemia,  Diabetes mellitus , Back pain, Depression with anxiety, Fatty liver, Gallstones, GERD (gastroesophageal reflux disease), Metabolic disorder, MTHFR (methylene THF reductase) deficiency and homocystinuria ([facility] [address] MTHFR mutation ([facility] [address] Nasal septal deviation, Preoperative clearance, Sleep apnea, and Unspecified diseases of blood and blood-forming organs.     Past Surgical History:  has a past surgical history that includes Hysterectomy; Cholecystectomy; Ectopic pregnancy surgery; ERCP; Sinus endoscopy (Bilateral, 10/27/2014); Abdomen surgery; ERCP (N/A, 1/4/2020); ERCP (1/4/2020); ERCP (1/4/2020); ERCP (1/4/2020); and ERCP (N/A, 2/24/2020).    Social History:  reports that she has never smoked. She has never used smokeless tobacco. She reports that she does not drink alcohol or use drugs.     Family History: family history includes Diabetes in her father and mother; High Blood Pressure in her brother and brother;  Other in her brother.      The patients

## 2020-04-02 ENCOUNTER — TELEPHONE (OUTPATIENT)
Dept: CARDIOLOGY CLINIC | Age: 50
End: 2020-04-02

## 2020-06-22 ENCOUNTER — HOSPITAL ENCOUNTER (OUTPATIENT)
Age: 50
Discharge: HOME | End: 2020-06-22
Payer: COMMERCIAL

## 2020-06-22 LAB
BASOPHILS ABSOLUTE: 0.05 E9/L (ref 0–0.2)
BASOPHILS RELATIVE PERCENT: 0.6 % (ref 0–2)
CHOLESTEROL, FASTING: 254 MG/DL (ref 0–199)
EOSINOPHILS ABSOLUTE: 0.17 E9/L (ref 0.05–0.5)
EOSINOPHILS RELATIVE PERCENT: 1.9 % (ref 0–6)
HBA1C MFR BLD: 8.6 % (ref 4–5.6)
HCT VFR BLD CALC: 42.9 % (ref 34–48)
HDLC SERPL-MCNC: 47 MG/DL
HEMOGLOBIN: 14.4 G/DL (ref 11.5–15.5)
IMMATURE GRANULOCYTES #: 0.04 E9/L
IMMATURE GRANULOCYTES %: 0.4 % (ref 0–5)
LDL CHOLESTEROL CALCULATED: 134 MG/DL (ref 0–99)
LYMPHOCYTES ABSOLUTE: 2.28 E9/L (ref 1.5–4)
LYMPHOCYTES RELATIVE PERCENT: 25.6 % (ref 20–42)
MCH RBC QN AUTO: 28.5 PG (ref 26–35)
MCHC RBC AUTO-ENTMCNC: 33.6 % (ref 32–34.5)
MCV RBC AUTO: 85 FL (ref 80–99.9)
MICROALBUMIN UR-MCNC: <12 MG/L
MONOCYTES ABSOLUTE: 0.51 E9/L (ref 0.1–0.95)
MONOCYTES RELATIVE PERCENT: 5.7 % (ref 2–12)
NEUTROPHILS ABSOLUTE: 5.85 E9/L (ref 1.8–7.3)
NEUTROPHILS RELATIVE PERCENT: 65.8 % (ref 43–80)
PDW BLD-RTO: 12.6 FL (ref 11.5–15)
PLATELET # BLD: 379 E9/L (ref 130–450)
PMV BLD AUTO: 9.8 FL (ref 7–12)
RBC # BLD: 5.05 E12/L (ref 3.5–5.5)
TRIGLYCERIDE, FASTING: 364 MG/DL (ref 0–149)
TSH SERPL DL<=0.05 MIU/L-ACNC: 2.72 UIU/ML (ref 0.27–4.2)
VLDLC SERPL CALC-MCNC: 73 MG/DL
WBC # BLD: 8.9 E9/L (ref 4.5–11.5)

## 2020-06-22 PROCEDURE — 80053 COMPREHEN METABOLIC PANEL: CPT

## 2020-06-22 PROCEDURE — 80061 LIPID PANEL: CPT

## 2020-06-22 PROCEDURE — 85025 COMPLETE CBC W/AUTO DIFF WBC: CPT

## 2020-06-22 PROCEDURE — 83036 HEMOGLOBIN GLYCOSYLATED A1C: CPT

## 2020-06-22 PROCEDURE — 82044 UR ALBUMIN SEMIQUANTITATIVE: CPT

## 2020-06-22 PROCEDURE — 36415 COLL VENOUS BLD VENIPUNCTURE: CPT

## 2020-06-22 PROCEDURE — 84443 ASSAY THYROID STIM HORMONE: CPT

## 2020-06-23 LAB
ALBUMIN SERPL-MCNC: 4.3 G/DL (ref 3.5–5.2)
ALP BLD-CCNC: 102 U/L (ref 35–104)
ALT SERPL-CCNC: 24 U/L (ref 0–32)
ANION GAP SERPL CALCULATED.3IONS-SCNC: 16 MMOL/L (ref 7–16)
AST SERPL-CCNC: 34 U/L (ref 0–31)
BILIRUB SERPL-MCNC: 0.4 MG/DL (ref 0–1.2)
BUN BLDV-MCNC: 15 MG/DL (ref 6–20)
CALCIUM SERPL-MCNC: 9.7 MG/DL (ref 8.6–10.2)
CHLORIDE BLD-SCNC: 99 MMOL/L (ref 98–107)
CO2: 24 MMOL/L (ref 22–29)
CREAT SERPL-MCNC: 0.9 MG/DL (ref 0.5–1)
GFR AFRICAN AMERICAN: >60
GFR NON-AFRICAN AMERICAN: >60 ML/MIN/1.73
GLUCOSE BLD-MCNC: 148 MG/DL (ref 74–99)
POTASSIUM SERPL-SCNC: 4.3 MMOL/L (ref 3.5–5)
SODIUM BLD-SCNC: 139 MMOL/L (ref 132–146)
TOTAL PROTEIN: 7.8 G/DL (ref 6.4–8.3)

## 2020-07-10 ENCOUNTER — APPOINTMENT (OUTPATIENT)
Dept: GENERAL RADIOLOGY | Age: 50
End: 2020-07-10
Payer: COMMERCIAL

## 2020-07-10 ENCOUNTER — HOSPITAL ENCOUNTER (EMERGENCY)
Age: 50
Discharge: HOME | End: 2020-07-10
Attending: EMERGENCY MEDICINE
Payer: COMMERCIAL

## 2020-07-10 VITALS
OXYGEN SATURATION: 99 % | TEMPERATURE: 99.4 F | WEIGHT: 224 LBS | HEART RATE: 73 BPM | SYSTOLIC BLOOD PRESSURE: 143 MMHG | RESPIRATION RATE: 18 BRPM | DIASTOLIC BLOOD PRESSURE: 89 MMHG | BODY MASS INDEX: 39.69 KG/M2 | HEIGHT: 63 IN

## 2020-07-10 PROCEDURE — 73130 X-RAY EXAM OF HAND: CPT

## 2020-07-10 PROCEDURE — 99283 EMERGENCY DEPT VISIT LOW MDM: CPT

## 2020-07-10 ASSESSMENT — ENCOUNTER SYMPTOMS
PHOTOPHOBIA: 0
CHEST TIGHTNESS: 0
TROUBLE SWALLOWING: 0
NAUSEA: 0
BACK PAIN: 0
COUGH: 0
VOMITING: 0
ABDOMINAL PAIN: 0
RHINORRHEA: 0
SORE THROAT: 0
SHORTNESS OF BREATH: 0

## 2020-07-11 NOTE — ED PROVIDER NOTES
Patient is a 59-year-old female that presents to the emergency department for evaluation of hand pain and bruise. Patient states that she has a history of clotting disorder but is only on aspirin. She states she bruises easily due to the aspirin. Today while she was washing her hands she noticed some pain in the left hand and small amount of swelling on the dorsal aspect of the left hand near the base of the second and third digit. Pain is described as a slightly aching sensation worse with palpation. Pain is been constant and mild in severity. She denies any numbness or tingling of the hand, weakness of the hand, chest pain, shortness of breath, pain in the arm or swelling of the extremity. Patient states she is concerned for possible blood clot. She denies any trauma. The history is provided by the patient. Other   This is a new problem. The current episode started 1 to 2 hours ago. The problem occurs constantly. The problem has not changed since onset. Pertinent negatives include no chest pain, no abdominal pain, no headaches and no shortness of breath. Nothing aggravates the symptoms. Nothing relieves the symptoms. She has tried nothing for the symptoms. Review of Systems   Constitutional: Negative for chills, diaphoresis, fatigue and fever. HENT: Negative for congestion, rhinorrhea, sore throat and trouble swallowing. Eyes: Negative for photophobia and visual disturbance. Respiratory: Negative for cough, chest tightness and shortness of breath. Cardiovascular: Negative for chest pain and leg swelling. Gastrointestinal: Negative for abdominal pain, nausea and vomiting. Genitourinary: Negative for decreased urine volume, difficulty urinating, dysuria, flank pain, frequency, hematuria and urgency. Musculoskeletal: Negative for back pain and myalgias. Skin: Negative for pallor and rash. Neurological: Negative for dizziness, weakness and headaches.         Physical Exam  Vitals signs and nursing note reviewed. Constitutional:       General: She is not in acute distress. Appearance: Normal appearance. She is obese. She is not ill-appearing. HENT:      Head: Normocephalic and atraumatic. Mouth/Throat:      Mouth: Mucous membranes are moist.   Eyes:      Extraocular Movements: Extraocular movements intact. Pupils: Pupils are equal, round, and reactive to light. Neck:      Musculoskeletal: No muscular tenderness. Cardiovascular:      Rate and Rhythm: Normal rate and regular rhythm. Pulses: Normal pulses. Radial pulses are 2+ on the right side and 2+ on the left side. Heart sounds: Normal heart sounds. No murmur. Pulmonary:      Effort: Pulmonary effort is normal. No respiratory distress. Breath sounds: Normal breath sounds. No wheezing or rhonchi. Abdominal:      General: Abdomen is flat. Palpations: Abdomen is soft. Tenderness: There is no abdominal tenderness. There is no guarding or rebound. Musculoskeletal:         General: Swelling and tenderness (Mild amount of swelling in the left dorsal aspect of the hand at the base of the second and third digit.) present. Lymphadenopathy:      Cervical: No cervical adenopathy. Skin:     General: Skin is warm and dry. Findings: Bruising present. Neurological:      Mental Status: She is alert and oriented to person, place, and time. Sensory: No sensory deficit. Motor: No weakness. Procedures     MDM  Number of Diagnoses or Management Options  Pain of left hand:   Diagnosis management comments: Patient is a 49-year-old female with a history of blood clotting disorder that presents the emergency department for evaluation of hand pain and bruising. Patient resting comfortably and in no apparent distress on exam.  She is neurovascularly intact.   There is small amount of ecchymosis and swelling on the left hand the dorsal aspect of the second and third digit.  X-ray ordered which was unremarkable showing no signs of fracture. Patient had mild improvement of pain while in the department. Not concerned for blood clot at this time due to the nature of this swelling and bruising. She has no symptoms signs or symptoms of PE. Discharged home in stable condition with instructions to follow-up with her primary care physician. Symptoms for return to the emergency room were discussed with the patient.              --------------------------------------------- PAST HISTORY ---------------------------------------------  Past Medical History:  has a past medical history of Back pain, CAD in native artery, Depression with anxiety, Diabetes mellitus ([facility] [address] Fatty liver, Gallstones, GERD (gastroesophageal reflux disease), Hiatal hernia, Hyperlipidemia, Hypertension, Metabolic disorder, MTHFR (methylene THF reductase) deficiency and homocystinuria ([facility] [address] MTHFR mutation ([facility] [address] Nasal septal deviation, NSTEMI (non-ST elevated myocardial infarction) ([facility] [address] Preoperative clearance, Prolonged emergence from general anesthesia, Sleep apnea, and Unspecified diseases of blood and blood-forming organs. Past Surgical History:  has a past surgical history that includes Hysterectomy; Cholecystectomy; Ectopic pregnancy surgery; ERCP; Sinus endoscopy (Bilateral, 10/27/2014); Abdomen surgery; ERCP (N/A, 1/4/2020); ERCP (1/4/2020); ERCP (1/4/2020); ERCP (1/4/2020); ERCP (N/A, 2/24/2020); and Cardiac surgery (03/16/2020). Social History:  reports that she has never smoked. She has never used smokeless tobacco. She reports that she does not drink alcohol or use drugs. Family History: family history includes Diabetes in her father and mother; High Blood Pressure in her brother and brother; Other in her brother. The patients home medications have been reviewed. Allergies: Latex; Morphine; Other; Statins;  Adhesive tape; and Sulfa antibiotics    -------------------------------------------------- RESULTS -------------------------------------------------  Labs:  No results found for this visit on 07/10/20. Radiology:  XR HAND LEFT (MIN 3 VIEWS)   Final Result   No acute osseous abnormality or evidence of an erosive arthropathy.             ------------------------- NURSING NOTES AND VITALS REVIEWED ---------------------------  Date / Time Roomed:  7/10/2020 10:04 PM  ED Bed Assignment:  13/13    The nursing notes within the ED encounter and vital signs as below have been reviewed. BP (!) 143/89   Pulse 73   Temp 99.4 °F (37.4 °C) (Oral)   Resp 18   Ht 5' 3\" (1.6 m)   Wt 224 lb (101.6 kg)   SpO2 99%   BMI 39.68 kg/m²   Oxygen Saturation Interpretation: Normal      ------------------------------------------ PROGRESS NOTES ------------------------------------------  10:50 PM EDT  I have spoken with the patient and discussed todays results, in addition to providing specific details for the plan of care and counseling regarding the diagnosis and prognosis. Their questions are answered at this time and they are agreeable with the plan. I discussed at length with them reasons for immediate return here for re evaluation. They will followup with their primary care physician by calling their office tomorrow. --------------------------------- ADDITIONAL PROVIDER NOTES ---------------------------------  At this time the patient is without objective evidence of an acute process requiring hospitalization or inpatient management. They have remained hemodynamically stable throughout their entire ED visit and are stable for discharge with outpatient follow-up. The plan has been discussed in detail and they are aware of the specific conditions for emergent return, as well as the importance of follow-up. New Prescriptions    No medications on file       Diagnosis:  1.  Pain of left hand        Disposition:  Patient's disposition: Discharge to home  Patient's condition is stable.        Lia Davenport., DO  Resident  07/10/20 [id number]

## 2020-07-14 ENCOUNTER — OFFICE VISIT (OUTPATIENT)
Dept: CARDIOLOGY CLINIC | Age: 50
End: 2020-07-14
Payer: COMMERCIAL

## 2020-07-14 VITALS
HEIGHT: 63 IN | WEIGHT: 224 LBS | DIASTOLIC BLOOD PRESSURE: 74 MMHG | BODY MASS INDEX: 39.69 KG/M2 | HEART RATE: 68 BPM | SYSTOLIC BLOOD PRESSURE: 118 MMHG

## 2020-07-14 PROCEDURE — 99243 OFF/OP CNSLTJ NEW/EST LOW 30: CPT | Performed by: INTERNAL MEDICINE

## 2020-07-14 RX ORDER — URSODIOL 300 MG/1
300 CAPSULE ORAL 2 TIMES DAILY
COMMUNITY

## 2020-07-14 RX ORDER — ICOSAPENT ETHYL 1000 MG/1
2 CAPSULE ORAL 2 TIMES DAILY
COMMUNITY

## 2020-07-14 RX ORDER — CLOPIDOGREL BISULFATE 75 MG/1
75 TABLET ORAL DAILY
Qty: 90 TABLET | Refills: 3 | Status: SHIPPED
Start: 2020-07-14 | End: 2021-07-06

## 2020-07-14 RX ORDER — ATORVASTATIN CALCIUM 20 MG/1
20 TABLET, FILM COATED ORAL
COMMUNITY
End: 2020-08-14

## 2020-07-19 PROCEDURE — 93000 ELECTROCARDIOGRAM COMPLETE: CPT | Performed by: INTERNAL MEDICINE

## 2020-07-26 ENCOUNTER — APPOINTMENT (OUTPATIENT)
Dept: ULTRASOUND IMAGING | Age: 50
DRG: 445 | End: 2020-07-26
Payer: COMMERCIAL

## 2020-07-26 ENCOUNTER — HOSPITAL ENCOUNTER (INPATIENT)
Age: 50
LOS: 6 days | Discharge: HOME | DRG: 445 | End: 2020-08-01
Attending: EMERGENCY MEDICINE | Admitting: INTERNAL MEDICINE
Payer: COMMERCIAL

## 2020-07-26 PROBLEM — K80.50 CHOLEDOCHOLITHIASIS: Status: ACTIVE | Noted: 2020-07-26

## 2020-07-26 LAB
ALBUMIN SERPL-MCNC: 4.1 G/DL (ref 3.5–5.2)
ALP BLD-CCNC: 213 U/L (ref 35–104)
ALT SERPL-CCNC: 131 U/L (ref 0–32)
ANION GAP SERPL CALCULATED.3IONS-SCNC: 15 MMOL/L (ref 7–16)
AST SERPL-CCNC: 366 U/L (ref 0–31)
BASOPHILS ABSOLUTE: 0.04 E9/L (ref 0–0.2)
BASOPHILS RELATIVE PERCENT: 0.5 % (ref 0–2)
BILIRUB SERPL-MCNC: 1.1 MG/DL (ref 0–1.2)
BILIRUBIN DIRECT: 0.7 MG/DL (ref 0–0.3)
BILIRUBIN, INDIRECT: 0.4 MG/DL (ref 0–1)
BUN BLDV-MCNC: 15 MG/DL (ref 6–20)
CALCIUM SERPL-MCNC: 9.6 MG/DL (ref 8.6–10.2)
CHLORIDE BLD-SCNC: 99 MMOL/L (ref 98–107)
CO2: 26 MMOL/L (ref 22–29)
CREAT SERPL-MCNC: 1 MG/DL (ref 0.5–1)
EOSINOPHILS ABSOLUTE: 0.07 E9/L (ref 0.05–0.5)
EOSINOPHILS RELATIVE PERCENT: 0.8 % (ref 0–6)
GFR AFRICAN AMERICAN: >60
GFR NON-AFRICAN AMERICAN: 59 ML/MIN/1.73
GLUCOSE BLD-MCNC: 169 MG/DL (ref 74–99)
HCT VFR BLD CALC: 39.3 % (ref 34–48)
HEMOGLOBIN: 13.2 G/DL (ref 11.5–15.5)
IMMATURE GRANULOCYTES #: 0.02 E9/L
IMMATURE GRANULOCYTES %: 0.2 % (ref 0–5)
LIPASE: 43 U/L (ref 13–60)
LYMPHOCYTES ABSOLUTE: 0.71 E9/L (ref 1.5–4)
LYMPHOCYTES RELATIVE PERCENT: 8.4 % (ref 20–42)
MCH RBC QN AUTO: 28.4 PG (ref 26–35)
MCHC RBC AUTO-ENTMCNC: 33.6 % (ref 32–34.5)
MCV RBC AUTO: 84.7 FL (ref 80–99.9)
METER GLUCOSE: 153 MG/DL (ref 74–99)
METER GLUCOSE: 170 MG/DL (ref 74–99)
MONOCYTES ABSOLUTE: 0.4 E9/L (ref 0.1–0.95)
MONOCYTES RELATIVE PERCENT: 4.8 % (ref 2–12)
NEUTROPHILS ABSOLUTE: 7.18 E9/L (ref 1.8–7.3)
NEUTROPHILS RELATIVE PERCENT: 85.3 % (ref 43–80)
PDW BLD-RTO: 12.7 FL (ref 11.5–15)
PLATELET # BLD: 316 E9/L (ref 130–450)
PMV BLD AUTO: 9.8 FL (ref 7–12)
POTASSIUM SERPL-SCNC: 4.3 MMOL/L (ref 3.5–5)
RBC # BLD: 4.64 E12/L (ref 3.5–5.5)
SODIUM BLD-SCNC: 140 MMOL/L (ref 132–146)
TOTAL PROTEIN: 7.5 G/DL (ref 6.4–8.3)
WBC # BLD: 8.4 E9/L (ref 4.5–11.5)

## 2020-07-26 PROCEDURE — 1200000000 HC SEMI PRIVATE

## 2020-07-26 PROCEDURE — 6360000002 HC RX W HCPCS: Performed by: STUDENT IN AN ORGANIZED HEALTH CARE EDUCATION/TRAINING PROGRAM

## 2020-07-26 PROCEDURE — 96375 TX/PRO/DX INJ NEW DRUG ADDON: CPT

## 2020-07-26 PROCEDURE — 99285 EMERGENCY DEPT VISIT HI MDM: CPT

## 2020-07-26 PROCEDURE — 76705 ECHO EXAM OF ABDOMEN: CPT

## 2020-07-26 PROCEDURE — 85025 COMPLETE CBC W/AUTO DIFF WBC: CPT

## 2020-07-26 PROCEDURE — 6370000000 HC RX 637 (ALT 250 FOR IP): Performed by: EMERGENCY MEDICINE

## 2020-07-26 PROCEDURE — 36415 COLL VENOUS BLD VENIPUNCTURE: CPT

## 2020-07-26 PROCEDURE — 83690 ASSAY OF LIPASE: CPT

## 2020-07-26 PROCEDURE — 6370000000 HC RX 637 (ALT 250 FOR IP): Performed by: INTERNAL MEDICINE

## 2020-07-26 PROCEDURE — 96376 TX/PRO/DX INJ SAME DRUG ADON: CPT

## 2020-07-26 PROCEDURE — 80076 HEPATIC FUNCTION PANEL: CPT

## 2020-07-26 PROCEDURE — 87040 BLOOD CULTURE FOR BACTERIA: CPT

## 2020-07-26 PROCEDURE — 87186 SC STD MICRODIL/AGAR DIL: CPT

## 2020-07-26 PROCEDURE — 80048 BASIC METABOLIC PNL TOTAL CA: CPT

## 2020-07-26 PROCEDURE — 2580000003 HC RX 258: Performed by: INTERNAL MEDICINE

## 2020-07-26 PROCEDURE — 82962 GLUCOSE BLOOD TEST: CPT

## 2020-07-26 PROCEDURE — 87077 CULTURE AEROBIC IDENTIFY: CPT

## 2020-07-26 PROCEDURE — 6360000002 HC RX W HCPCS: Performed by: INTERNAL MEDICINE

## 2020-07-26 PROCEDURE — 96374 THER/PROPH/DIAG INJ IV PUSH: CPT

## 2020-07-26 RX ORDER — ONDANSETRON 2 MG/ML
4 INJECTION INTRAMUSCULAR; INTRAVENOUS ONCE
Status: COMPLETED | OUTPATIENT
Start: 2020-07-26 | End: 2020-07-26

## 2020-07-26 RX ORDER — DIPHENHYDRAMINE HCL 25 MG
50 TABLET ORAL NIGHTLY
Status: DISCONTINUED | OUTPATIENT
Start: 2020-07-26 | End: 2020-08-01 | Stop reason: HOSPADM

## 2020-07-26 RX ORDER — PANTOPRAZOLE SODIUM 40 MG/1
40 TABLET, DELAYED RELEASE ORAL 2 TIMES DAILY
Status: DISCONTINUED | OUTPATIENT
Start: 2020-07-26 | End: 2020-08-01 | Stop reason: HOSPADM

## 2020-07-26 RX ORDER — FLUOXETINE HYDROCHLORIDE 20 MG/1
20 CAPSULE ORAL DAILY
Status: DISCONTINUED | OUTPATIENT
Start: 2020-07-27 | End: 2020-08-01 | Stop reason: HOSPADM

## 2020-07-26 RX ORDER — DEXTROSE MONOHYDRATE 25 G/50ML
12.5 INJECTION, SOLUTION INTRAVENOUS PRN
Status: DISCONTINUED | OUTPATIENT
Start: 2020-07-26 | End: 2020-08-01 | Stop reason: HOSPADM

## 2020-07-26 RX ORDER — ACETAMINOPHEN 325 MG/1
650 TABLET ORAL ONCE
Status: COMPLETED | OUTPATIENT
Start: 2020-07-26 | End: 2020-07-26

## 2020-07-26 RX ORDER — SODIUM CHLORIDE 9 MG/ML
INJECTION, SOLUTION INTRAVENOUS EVERY 8 HOURS
Status: DISCONTINUED | OUTPATIENT
Start: 2020-07-26 | End: 2020-08-01

## 2020-07-26 RX ORDER — INSULIN GLARGINE 100 [IU]/ML
30 INJECTION, SOLUTION SUBCUTANEOUS NIGHTLY
Status: DISCONTINUED | OUTPATIENT
Start: 2020-07-26 | End: 2020-08-01 | Stop reason: HOSPADM

## 2020-07-26 RX ORDER — OMEGA-3-ACID ETHYL ESTERS 1 G/1
2 CAPSULE, LIQUID FILLED ORAL 2 TIMES DAILY
Status: DISCONTINUED | OUTPATIENT
Start: 2020-07-26 | End: 2020-08-01 | Stop reason: HOSPADM

## 2020-07-26 RX ORDER — NICOTINE POLACRILEX 4 MG
15 LOZENGE BUCCAL PRN
Status: DISCONTINUED | OUTPATIENT
Start: 2020-07-26 | End: 2020-08-01 | Stop reason: HOSPADM

## 2020-07-26 RX ORDER — FENTANYL CITRATE 50 UG/ML
50 INJECTION, SOLUTION INTRAMUSCULAR; INTRAVENOUS EVERY 30 MIN PRN
Status: DISCONTINUED | OUTPATIENT
Start: 2020-07-26 | End: 2020-07-26

## 2020-07-26 RX ORDER — ASPIRIN 81 MG/1
81 TABLET, CHEWABLE ORAL NIGHTLY
Status: DISCONTINUED | OUTPATIENT
Start: 2020-07-26 | End: 2020-08-01 | Stop reason: HOSPADM

## 2020-07-26 RX ORDER — ACETAMINOPHEN 650 MG/1
650 SUPPOSITORY RECTAL EVERY 6 HOURS PRN
Status: DISCONTINUED | OUTPATIENT
Start: 2020-07-26 | End: 2020-08-01 | Stop reason: HOSPADM

## 2020-07-26 RX ORDER — METOCLOPRAMIDE HYDROCHLORIDE 5 MG/ML
10 INJECTION INTRAMUSCULAR; INTRAVENOUS
Status: COMPLETED | OUTPATIENT
Start: 2020-07-26 | End: 2020-07-26

## 2020-07-26 RX ORDER — SODIUM CHLORIDE 0.9 % (FLUSH) 0.9 %
10 SYRINGE (ML) INJECTION PRN
Status: DISCONTINUED | OUTPATIENT
Start: 2020-07-26 | End: 2020-08-01 | Stop reason: HOSPADM

## 2020-07-26 RX ORDER — ICOSAPENT ETHYL 1000 MG/1
2 CAPSULE ORAL 2 TIMES DAILY
Status: DISCONTINUED | OUTPATIENT
Start: 2020-07-26 | End: 2020-07-26 | Stop reason: CLARIF

## 2020-07-26 RX ORDER — SODIUM CHLORIDE 9 MG/ML
INJECTION, SOLUTION INTRAVENOUS CONTINUOUS
Status: DISCONTINUED | OUTPATIENT
Start: 2020-07-26 | End: 2020-07-29

## 2020-07-26 RX ORDER — ACETAMINOPHEN 325 MG/1
650 TABLET ORAL EVERY 6 HOURS PRN
Status: DISCONTINUED | OUTPATIENT
Start: 2020-07-26 | End: 2020-08-01 | Stop reason: HOSPADM

## 2020-07-26 RX ORDER — FLUTICASONE PROPIONATE 50 MCG
2 SPRAY, SUSPENSION (ML) NASAL PRN
Status: DISCONTINUED | OUTPATIENT
Start: 2020-07-26 | End: 2020-08-01 | Stop reason: HOSPADM

## 2020-07-26 RX ORDER — METOPROLOL SUCCINATE 50 MG/1
50 TABLET, EXTENDED RELEASE ORAL 2 TIMES DAILY
Status: DISCONTINUED | OUTPATIENT
Start: 2020-07-26 | End: 2020-07-28

## 2020-07-26 RX ORDER — ONDANSETRON 2 MG/ML
4 INJECTION INTRAMUSCULAR; INTRAVENOUS EVERY 6 HOURS PRN
Status: DISCONTINUED | OUTPATIENT
Start: 2020-07-26 | End: 2020-08-01 | Stop reason: HOSPADM

## 2020-07-26 RX ORDER — PROMETHAZINE HYDROCHLORIDE 25 MG/1
12.5 TABLET ORAL EVERY 6 HOURS PRN
Status: DISCONTINUED | OUTPATIENT
Start: 2020-07-26 | End: 2020-08-01 | Stop reason: HOSPADM

## 2020-07-26 RX ORDER — TRIAMTERENE AND HYDROCHLOROTHIAZIDE 37.5; 25 MG/1; MG/1
2 TABLET ORAL DAILY
Status: DISCONTINUED | OUTPATIENT
Start: 2020-07-27 | End: 2020-07-28

## 2020-07-26 RX ORDER — DEXTROSE MONOHYDRATE 50 MG/ML
100 INJECTION, SOLUTION INTRAVENOUS PRN
Status: DISCONTINUED | OUTPATIENT
Start: 2020-07-26 | End: 2020-08-01 | Stop reason: HOSPADM

## 2020-07-26 RX ORDER — CLOPIDOGREL BISULFATE 75 MG/1
75 TABLET ORAL DAILY
Status: DISCONTINUED | OUTPATIENT
Start: 2020-07-27 | End: 2020-08-01 | Stop reason: HOSPADM

## 2020-07-26 RX ORDER — METOPROLOL TARTRATE 50 MG/1
25 TABLET, FILM COATED ORAL DAILY
COMMUNITY

## 2020-07-26 RX ORDER — HYDROMORPHONE HYDROCHLORIDE 1 MG/ML
0.5 INJECTION, SOLUTION INTRAMUSCULAR; INTRAVENOUS; SUBCUTANEOUS EVERY 6 HOURS PRN
Status: DISCONTINUED | OUTPATIENT
Start: 2020-07-26 | End: 2020-07-28

## 2020-07-26 RX ORDER — TIZANIDINE 4 MG/1
4 TABLET ORAL NIGHTLY
Status: DISCONTINUED | OUTPATIENT
Start: 2020-07-26 | End: 2020-08-01 | Stop reason: HOSPADM

## 2020-07-26 RX ORDER — URSODIOL 300 MG/1
300 CAPSULE ORAL 2 TIMES DAILY
Status: DISCONTINUED | OUTPATIENT
Start: 2020-07-26 | End: 2020-08-01 | Stop reason: HOSPADM

## 2020-07-26 RX ORDER — POLYETHYLENE GLYCOL 3350 17 G/17G
17 POWDER, FOR SOLUTION ORAL DAILY PRN
Status: DISCONTINUED | OUTPATIENT
Start: 2020-07-26 | End: 2020-08-01 | Stop reason: HOSPADM

## 2020-07-26 RX ORDER — DIPHENHYDRAMINE HCL 50 MG
50 CAPSULE ORAL NIGHTLY
Status: DISCONTINUED | OUTPATIENT
Start: 2020-07-26 | End: 2020-07-26 | Stop reason: CLARIF

## 2020-07-26 RX ORDER — SODIUM CHLORIDE 0.9 % (FLUSH) 0.9 %
10 SYRINGE (ML) INJECTION EVERY 12 HOURS SCHEDULED
Status: DISCONTINUED | OUTPATIENT
Start: 2020-07-26 | End: 2020-08-01 | Stop reason: HOSPADM

## 2020-07-26 RX ADMIN — ONDANSETRON 4 MG: 2 INJECTION INTRAMUSCULAR; INTRAVENOUS at 15:56

## 2020-07-26 RX ADMIN — ACETAMINOPHEN 650 MG: 325 TABLET, FILM COATED ORAL at 17:36

## 2020-07-26 RX ADMIN — METOPROLOL SUCCINATE 50 MG: 50 TABLET, EXTENDED RELEASE ORAL at 21:06

## 2020-07-26 RX ADMIN — URSODIOL 300 MG: 300 CAPSULE ORAL at 21:06

## 2020-07-26 RX ADMIN — INSULIN LISPRO 1 UNITS: 100 INJECTION, SOLUTION INTRAVENOUS; SUBCUTANEOUS at 21:22

## 2020-07-26 RX ADMIN — Medication 10 ML: at 21:07

## 2020-07-26 RX ADMIN — TIZANIDINE 4 MG: 4 TABLET ORAL at 21:06

## 2020-07-26 RX ADMIN — METOCLOPRAMIDE 10 MG: 5 INJECTION, SOLUTION INTRAMUSCULAR; INTRAVENOUS at 15:56

## 2020-07-26 RX ADMIN — PANTOPRAZOLE SODIUM 40 MG: 40 TABLET, DELAYED RELEASE ORAL at 21:06

## 2020-07-26 RX ADMIN — PIPERACILLIN AND TAZOBACTAM 3.38 G: 3; .375 INJECTION, POWDER, FOR SOLUTION INTRAVENOUS at 21:05

## 2020-07-26 RX ADMIN — ENOXAPARIN SODIUM 40 MG: 40 INJECTION SUBCUTANEOUS at 18:24

## 2020-07-26 RX ADMIN — ASPIRIN 81 MG CHEWABLE TABLET 81 MG: 81 TABLET CHEWABLE at 21:06

## 2020-07-26 RX ADMIN — SODIUM CHLORIDE: 9 INJECTION, SOLUTION INTRAVENOUS at 18:24

## 2020-07-26 RX ADMIN — FENTANYL CITRATE 50 MCG: 50 INJECTION, SOLUTION INTRAMUSCULAR; INTRAVENOUS at 14:34

## 2020-07-26 RX ADMIN — INSULIN GLARGINE 30 UNITS: 100 INJECTION, SOLUTION SUBCUTANEOUS at 21:22

## 2020-07-26 RX ADMIN — ONDANSETRON HYDROCHLORIDE 4 MG: 2 SOLUTION INTRAMUSCULAR; INTRAVENOUS at 14:33

## 2020-07-26 RX ADMIN — DIPHENHYDRAMINE HYDROCHLORIDE 50 MG: 25 TABLET ORAL at 21:06

## 2020-07-26 RX ADMIN — OMEGA-3-ACID ETHYL ESTERS 2 G: 1 CAPSULE, LIQUID FILLED ORAL at 21:06

## 2020-07-26 ASSESSMENT — PAIN SCALES - GENERAL
PAINLEVEL_OUTOF10: 8
PAINLEVEL_OUTOF10: 0
PAINLEVEL_OUTOF10: 0

## 2020-07-26 ASSESSMENT — ENCOUNTER SYMPTOMS
CONSTIPATION: 1
NAUSEA: 0
SHORTNESS OF BREATH: 0
DIARRHEA: 0
ABDOMINAL PAIN: 1
VOMITING: 1

## 2020-07-26 ASSESSMENT — PAIN DESCRIPTION - LOCATION: LOCATION: ABDOMEN

## 2020-07-26 ASSESSMENT — PAIN DESCRIPTION - ORIENTATION: ORIENTATION: MID;RIGHT

## 2020-07-26 ASSESSMENT — PAIN DESCRIPTION - PAIN TYPE: TYPE: ACUTE PAIN

## 2020-07-26 NOTE — H&P
History and Physical      CHIEF COMPLAINT:  Abdominal Pain (mid abdominal pain that radiates to right side, started about 1 1/2 hour ago, has hx of gallstones)    History Obtained From:  patient, electronic medical record    HISTORY OF PRESENT ILLNESS:    The patient is a 48 y.o. female with a history of multiple medical problems including recurrent choledocholithiasis, IDDM, coagulation defect and recent NSTEMI who presents to the emergency department with epigastric and right upper quadrant pain worsening over the past few days. Patient states she has experienced similar pain in the past consistent with her sphincter of Oddi dysfunction requiring ERCPs.  Her most recent ERCP was in January by Dr Susan Glez in which a stent was placed and subsequently removed in February. Patient has been ursodiol since. Her symptoms were associated with nausea and vomiting but no other complaints of fevers, chills, chest pain or shortness of breath.     Past Medical History:    Past Medical History:   Diagnosis Date    Back pain     CAD in native artery 3/16/2020    Depression with anxiety     Diabetes mellitus ([address]     Fatty liver     Gallstones     GERD (gastroesophageal reflux disease)     Hiatal hernia     Hyperlipidemia     Hypertension     Metabolic disorder     MTHFR (methylene THF reductase) deficiency and homocystinuria (HCC)     MTHFR mutation ([address]     Nasal septal deviation     procedure 10/27/2014    NSTEMI (non-ST elevated myocardial infarction) ([address]     Preoperative clearance 10/20/2014    medical-Dr. Mckinney December; paper copy on chart for procedure 10/27/2014    Prolonged emergence from general anesthesia     Sleep apnea     cpap    Unspecified diseases of blood and blood-forming organs      Past Surgical History:    Past Surgical History:   Procedure Laterality Date    ABDOMEN SURGERY      CARDIAC SURGERY  03/16/2020    cardiac catheterization/stent    CHOLECYSTECTOMY      ECTOPIC PREGNANCY daily   triamterene-hydrochlorothiazide (MAXZIDE) 75-50 MG per tablet, Take 1 tablet by mouth daily. FLUoxetine (PROZAC) 20 MG capsule, Take 20 mg by mouth daily   aspirin 81 MG tablet, Take 81 mg by mouth nightly     Allergies:    Latex; Morphine; Other; Statins; Adhesive tape; and Sulfa antibiotics    Social History:    reports that she has never smoked. She has never used smokeless tobacco. She reports previous alcohol use. She reports that she does not use drugs.     Family History:   family history includes Diabetes in her father and mother; Heart Disease in her mother; High Blood Pressure in her brother and brother; Other in her brother. Review of Systems  Please see HPI above. All bolded are positive. All un-bolded are negative. Gen: fever, chills, fatigue, weakness, diaphoresis, increase in thirst, unintentional weight change, loss of appetite  Head: headache, vision change, hearing loss  Chest: chest pain, chest heaviness, palpitations, orthopnea, PND, VERDIN  Lungs: shortness of breath, wheezing, coughing  Abdomen: abdominal pain, nausea, vomiting, diarrhea, constipation, melena, hematochezia, hematemesis  Extremities: lower extremity edema, myalgias, arthralgias  Urinary: dysuria, hematuria, or increase in frequency  Neurologic: lightheadedness, dizziness, confusion, syncope  Psychiatric: depression, suicidal ideation, or anxiety    PHYSICAL EXAM:  Vitals:  BP (!) 145/94   Pulse 90   Temp 98.8 °F (37.1 °C) (Oral)   Resp 18   Ht 5' 3\" (1.6 m)   Wt 224 lb (101.6 kg)   SpO2 95%   BMI 39.68 kg/m²     General:  Awake, alert, oriented X 3. Well developed, well nourished, well groomed. No apparent distress. HEENT:  Normocephalic, atraumatic. Pupils equal, round, reactive to light. No scleral icterus. No conjunctival injection. Normal lips, teeth, and gums. No nasal discharge.   Neck:  Supple  Heart:  RRR, pos S1S2  Lungs:  CTA bilaterally, bilat symmetrical expansion, no wheeze, rales, or rhonchi  Abdomen: Bowel sounds present, soft, epigastric/RUQ tenderness  Extremities:  No clubbing, cyanosis, or edema  Skin:  Warm and dry, no open lesions or rash  Neuro:  Cranial nerves 2-12 intact, no focal deficits  Breast: deferred  Rectal: deferred  Genitalia:  deferred    LABS:  Lab Results   Component Value Date    WBC 8.4 07/26/2020    RBC 4.64 07/26/2020    HGB 13.2 07/26/2020    HCT 39.3 07/26/2020    MCV 84.7 07/26/2020    MCH 28.4 07/26/2020    MCHC 33.6 07/26/2020    RDW 12.7 07/26/2020     07/26/2020    MPV 9.8 07/26/2020     Lab Results   Component Value Date     07/26/2020    K 4.3 07/26/2020    K 4.3 01/02/2020    CL 99 07/26/2020    CO2 26 07/26/2020    BUN 15 07/26/2020    CREATININE 1.0 07/26/2020    GFRAA >60 07/26/2020    LABGLOM 59 07/26/2020    GLUCOSE 169 07/26/2020    GLUCOSE 150 02/23/2012    PROT 7.5 07/26/2020    LABALBU 4.1 07/26/2020    LABALBU 4.0 10/27/2011    CALCIUM 9.6 07/26/2020    BILITOT 1.1 07/26/2020    ALKPHOS 213 07/26/2020     07/26/2020     07/26/2020     Lab Results   Component Value Date    PROTIME 10.7 02/24/2020    INR 0.9 02/24/2020     No results for input(s): CKTOTAL, CKMB, CKMBINDEX, TROPONINI in the last 72 hours.   Lab Results   Component Value Date    NITRU Negative 01/02/2020    COLORU Yellow 01/02/2020    PHUR 6.0 01/02/2020    LABCAST RARE 01/02/2020    WBCUA NONE 01/02/2020    WBCUA 0-1 10/27/2011    RBCUA NONE 01/02/2020    RBCUA 0-1 11/18/2013    BACTERIA NONE 01/02/2020    CLARITYU Clear 01/02/2020    SPECGRAV 1.010 01/02/2020    LEUKOCYTESUR Negative 01/02/2020    UROBILINOGEN 0.2 01/02/2020    BILIRUBINUR Negative 01/02/2020    BILIRUBINUR NEGATIVE 10/27/2011    BLOODU Negative 01/02/2020    GLUCOSEU >=1000 01/02/2020    GLUCOSEU NEGATIVE 10/27/2011    KETUA TRACE 01/02/2020    AMORPHOUS MODERATE 04/20/2015     Lab Results   Component Value Date    MG 1.5 01/18/2019    PHOS 2.7 01/16/2019     Lab Results   Component Value Date    LABA1C 8.6 06/22/2020     Lab Results   Component Value Date    TSH 2.720 06/22/2020    FERRITIN 1,181 01/02/2020    IRON 38 01/02/2020    TIBC 248 01/02/2020    PTRFSAT 11 03/20/2014     Lab Results   Component Value Date    TRIG 714 03/15/2020    HDL 47 06/22/2020    LDLCALC 134 06/22/2020    LABVLDL 73 06/22/2020     Lab Results   Component Value Date    AMYLASE 18 01/18/2019    LIPASE 43 07/26/2020     No results found for: BNP  Lab Results   Component Value Date    LACTA 5.4 01/02/2020     No results found for: LITHIUM, DILFRTOT, VALPROATE  Lab Results   Component Value Date    PH 7.48 11/18/2013     No results found for: Vicie Goon, LABBENZ, CANNAB, COCAINESCRN, LABMETH, [address], PHENCYCLIDINESCREENURINE, PPXUR, ETOH  No results found for: [facility]  Lab Results   Component Value Date    VITD25 15 01/09/2019       Radiology  Xr Hand Left (min 3 Views)    Result Date: 7/10/2020  EXAMINATION: THREE XRAY VIEWS OF THE LEFT HAND 7/10/2020 10:18 pm COMPARISON: None. HISTORY: ORDERING SYSTEM PROVIDED HISTORY: hand pain TECHNOLOGIST PROVIDED HISTORY: Reason for exam:->hand pain FINDINGS: Bones are intact and in anatomic alignment. Joint spaces are maintained. No juxta-articular/marginal erosions. Unremarkable soft tissues. No acute osseous abnormality or evidence of an erosive arthropathy. Us Abdomen Limited    Result Date: 7/26/2020  EXAMINATION: RIGHT UPPER QUADRANT ULTRASOUND 7/26/2020 3:26 pm COMPARISON: January 5, 2020 HISTORY: ORDERING SYSTEM PROVIDED HISTORY: RUQ pain, h/o cholecystectomy TECHNOLOGIST PROVIDED HISTORY: Reason for exam:->RUQ pain, h/o cholecystectomy Reason for exam:->h/o choledocho, recurrent stones. r/o dilated ducts or stone. Acute right upper quadrant abdominal pain. Prior cholecystectomy. FINDINGS: LIVER:  Increased echogenicity of the liver. No focal hepatic lesion or evidence of intrahepatic ductal dilatation. BILIARY SYSTEM:  Cholecystectomy.  Common bile duct is within normal limits measuring 0.5 cm. There is an approximately 4 mm echogenic focus in the common bile duct with no appreciable shadowing. RIGHT KIDNEY: The right kidney is grossly unremarkable without evidence of hydronephrosis. PANCREAS:  Visualized portions of the pancreas are unremarkable. OTHER: No evidence of right upper quadrant ascites. 1. No convincing finding to account for patient's right upper quadrant abdominal pain. There is a questionable 4 mm echogenic focus in the common bile duct with no associated intra or extrahepatic ductal dilatation. Differential includes ductal debris and retained stone. If there is clinical concern for choledocholithiasis, consider MRCP for further evaluation. 2. Hepatic steatosis. 3. Cholecystectomy. EKG:  Reviewed    ASSESSMENT:    Principal Problem:    Choledocholithiasis  Active Problems:    Elevated LFTs    Abdominal pain    Nausea and vomiting in adult    MTHFR mutation (HCC)    Diabetes mellitus ([address]    CAD in native artery  Resolved Problems:    * No resolved hospital problems. *    PLAN:  Admit pt  IVF  GI consult  Follow labs  BS control  DVT prophylaxis    Time spent face to face with patient along with family counseling and discussing care exceeded 50% of the time of the visit. Additional time spent reviewing images and labs, discussing case with nursing, support staff and other physicians; as well as coordinating care. Nataliia Kenney MD  5:08 PM  7/26/2020    NOTE:  This report was transcribed using voice recognition software. Every effort was made to ensure accuracy; however, inadvertent computerized transcription errors may be present.

## 2020-07-27 ENCOUNTER — APPOINTMENT (OUTPATIENT)
Dept: MRI IMAGING | Age: 50
DRG: 445 | End: 2020-07-27
Payer: COMMERCIAL

## 2020-07-27 LAB
ALBUMIN SERPL-MCNC: 3.7 G/DL (ref 3.5–5.2)
ALP BLD-CCNC: 234 U/L (ref 35–104)
ALT SERPL-CCNC: 162 U/L (ref 0–32)
ANION GAP SERPL CALCULATED.3IONS-SCNC: 15 MMOL/L (ref 7–16)
AST SERPL-CCNC: 190 U/L (ref 0–31)
BASOPHILS ABSOLUTE: 0.02 E9/L (ref 0–0.2)
BASOPHILS RELATIVE PERCENT: 0.2 % (ref 0–2)
BILIRUB SERPL-MCNC: 1.5 MG/DL (ref 0–1.2)
BUN BLDV-MCNC: 15 MG/DL (ref 6–20)
CALCIUM SERPL-MCNC: 8.7 MG/DL (ref 8.6–10.2)
CHLORIDE BLD-SCNC: 100 MMOL/L (ref 98–107)
CO2: 26 MMOL/L (ref 22–29)
CREAT SERPL-MCNC: 1.1 MG/DL (ref 0.5–1)
EOSINOPHILS ABSOLUTE: 0.03 E9/L (ref 0.05–0.5)
EOSINOPHILS RELATIVE PERCENT: 0.3 % (ref 0–6)
GFR AFRICAN AMERICAN: >60
GFR NON-AFRICAN AMERICAN: 52 ML/MIN/1.73
GLUCOSE BLD-MCNC: 98 MG/DL (ref 74–99)
HBA1C MFR BLD: 8.6 % (ref 4–5.6)
HCT VFR BLD CALC: 36.6 % (ref 34–48)
HEMOGLOBIN: 12.3 G/DL (ref 11.5–15.5)
IMMATURE GRANULOCYTES #: 0.02 E9/L
IMMATURE GRANULOCYTES %: 0.2 % (ref 0–5)
INR BLD: 1.1
LYMPHOCYTES ABSOLUTE: 0.9 E9/L (ref 1.5–4)
LYMPHOCYTES RELATIVE PERCENT: 9.7 % (ref 20–42)
MCH RBC QN AUTO: 28.3 PG (ref 26–35)
MCHC RBC AUTO-ENTMCNC: 33.6 % (ref 32–34.5)
MCV RBC AUTO: 84.3 FL (ref 80–99.9)
METER GLUCOSE: 103 MG/DL (ref 74–99)
METER GLUCOSE: 114 MG/DL (ref 74–99)
METER GLUCOSE: 372 MG/DL (ref 74–99)
METER GLUCOSE: 95 MG/DL (ref 74–99)
MONOCYTES ABSOLUTE: 0.65 E9/L (ref 0.1–0.95)
MONOCYTES RELATIVE PERCENT: 7 % (ref 2–12)
NEUTROPHILS ABSOLUTE: 7.64 E9/L (ref 1.8–7.3)
NEUTROPHILS RELATIVE PERCENT: 82.6 % (ref 43–80)
PDW BLD-RTO: 12.8 FL (ref 11.5–15)
PLATELET # BLD: 295 E9/L (ref 130–450)
PMV BLD AUTO: 9.8 FL (ref 7–12)
POTASSIUM REFLEX MAGNESIUM: 3.7 MMOL/L (ref 3.5–5)
PROTHROMBIN TIME: 12.7 SEC (ref 9.3–12.4)
RBC # BLD: 4.34 E12/L (ref 3.5–5.5)
SODIUM BLD-SCNC: 141 MMOL/L (ref 132–146)
TOTAL PROTEIN: 7.1 G/DL (ref 6.4–8.3)
WBC # BLD: 9.3 E9/L (ref 4.5–11.5)

## 2020-07-27 PROCEDURE — 74183 MRI ABD W/O CNTR FLWD CNTR: CPT

## 2020-07-27 PROCEDURE — 83036 HEMOGLOBIN GLYCOSYLATED A1C: CPT

## 2020-07-27 PROCEDURE — 85025 COMPLETE CBC W/AUTO DIFF WBC: CPT

## 2020-07-27 PROCEDURE — A9577 INJ MULTIHANCE: HCPCS | Performed by: RADIOLOGY

## 2020-07-27 PROCEDURE — 85610 PROTHROMBIN TIME: CPT

## 2020-07-27 PROCEDURE — 6360000004 HC RX CONTRAST MEDICATION: Performed by: RADIOLOGY

## 2020-07-27 PROCEDURE — 1200000000 HC SEMI PRIVATE

## 2020-07-27 PROCEDURE — 2580000003 HC RX 258: Performed by: INTERNAL MEDICINE

## 2020-07-27 PROCEDURE — 6360000002 HC RX W HCPCS: Performed by: INTERNAL MEDICINE

## 2020-07-27 PROCEDURE — 82962 GLUCOSE BLOOD TEST: CPT

## 2020-07-27 PROCEDURE — 80053 COMPREHEN METABOLIC PANEL: CPT

## 2020-07-27 PROCEDURE — 36415 COLL VENOUS BLD VENIPUNCTURE: CPT

## 2020-07-27 PROCEDURE — 6370000000 HC RX 637 (ALT 250 FOR IP): Performed by: INTERNAL MEDICINE

## 2020-07-27 RX ADMIN — DIPHENHYDRAMINE HYDROCHLORIDE 50 MG: 25 TABLET ORAL at 22:07

## 2020-07-27 RX ADMIN — PIPERACILLIN AND TAZOBACTAM 3.38 G: 3; .375 INJECTION, POWDER, FOR SOLUTION INTRAVENOUS at 22:06

## 2020-07-27 RX ADMIN — INSULIN GLARGINE 30 UNITS: 100 INJECTION, SOLUTION SUBCUTANEOUS at 22:12

## 2020-07-27 RX ADMIN — SODIUM CHLORIDE: 9 INJECTION, SOLUTION INTRAVENOUS at 09:08

## 2020-07-27 RX ADMIN — PIPERACILLIN AND TAZOBACTAM 3.38 G: 3; .375 INJECTION, POWDER, FOR SOLUTION INTRAVENOUS at 04:29

## 2020-07-27 RX ADMIN — OMEGA-3-ACID ETHYL ESTERS 2 G: 1 CAPSULE, LIQUID FILLED ORAL at 08:54

## 2020-07-27 RX ADMIN — GADOBENATE DIMEGLUMINE 20 ML: 529 INJECTION, SOLUTION INTRAVENOUS at 16:40

## 2020-07-27 RX ADMIN — OMEGA-3-ACID ETHYL ESTERS 2 G: 1 CAPSULE, LIQUID FILLED ORAL at 22:08

## 2020-07-27 RX ADMIN — PANTOPRAZOLE SODIUM 40 MG: 40 TABLET, DELAYED RELEASE ORAL at 22:08

## 2020-07-27 RX ADMIN — URSODIOL 300 MG: 300 CAPSULE ORAL at 08:55

## 2020-07-27 RX ADMIN — SODIUM CHLORIDE: 9 INJECTION, SOLUTION INTRAVENOUS at 01:07

## 2020-07-27 RX ADMIN — ASPIRIN 81 MG CHEWABLE TABLET 81 MG: 81 TABLET CHEWABLE at 22:11

## 2020-07-27 RX ADMIN — INSULIN LISPRO 5 UNITS: 100 INJECTION, SOLUTION INTRAVENOUS; SUBCUTANEOUS at 22:13

## 2020-07-27 RX ADMIN — FLUOXETINE 20 MG: 20 CAPSULE ORAL at 08:55

## 2020-07-27 RX ADMIN — Medication 10 ML: at 22:17

## 2020-07-27 RX ADMIN — URSODIOL 300 MG: 300 CAPSULE ORAL at 22:08

## 2020-07-27 RX ADMIN — CLOPIDOGREL BISULFATE 75 MG: 75 TABLET ORAL at 08:55

## 2020-07-27 RX ADMIN — PANTOPRAZOLE SODIUM 40 MG: 40 TABLET, DELAYED RELEASE ORAL at 08:55

## 2020-07-27 RX ADMIN — PIPERACILLIN AND TAZOBACTAM 3.38 G: 3; .375 INJECTION, POWDER, FOR SOLUTION INTRAVENOUS at 12:26

## 2020-07-27 RX ADMIN — TIZANIDINE 4 MG: 4 TABLET ORAL at 22:08

## 2020-07-27 RX ADMIN — TRIAMTERENE AND HYDROCHLOROTHIAZIDE 2 TABLET: 37.5; 25 TABLET ORAL at 09:00

## 2020-07-27 RX ADMIN — SODIUM CHLORIDE: 9 INJECTION, SOLUTION INTRAVENOUS at 09:07

## 2020-07-27 RX ADMIN — METOPROLOL SUCCINATE 50 MG: 50 TABLET, EXTENDED RELEASE ORAL at 22:07

## 2020-07-27 ASSESSMENT — PAIN SCALES - GENERAL
PAINLEVEL_OUTOF10: 0
PAINLEVEL_OUTOF10: 0
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
## 2020-07-27 NOTE — CARE COORDINATION
CM note: covid not tested. Per chart review, patient lives with spouse, no hx of dme/hhc, independent with adls. PCP:Dr. Remi Moore and Olivia Pang for medications. No anticipated discharge needs, will continue to follow.

## 2020-07-28 LAB
ALBUMIN SERPL-MCNC: 3.3 G/DL (ref 3.5–5.2)
ALP BLD-CCNC: 177 U/L (ref 35–104)
ALT SERPL-CCNC: 90 U/L (ref 0–32)
ANION GAP SERPL CALCULATED.3IONS-SCNC: 13 MMOL/L (ref 7–16)
AST SERPL-CCNC: 50 U/L (ref 0–31)
BILIRUB SERPL-MCNC: 0.5 MG/DL (ref 0–1.2)
BUN BLDV-MCNC: 10 MG/DL (ref 6–20)
CALCIUM SERPL-MCNC: 8.3 MG/DL (ref 8.6–10.2)
CHLORIDE BLD-SCNC: 103 MMOL/L (ref 98–107)
CO2: 25 MMOL/L (ref 22–29)
CREAT SERPL-MCNC: 1.1 MG/DL (ref 0.5–1)
GFR AFRICAN AMERICAN: >60
GFR NON-AFRICAN AMERICAN: 52 ML/MIN/1.73
GLUCOSE BLD-MCNC: 89 MG/DL (ref 74–99)
HCT VFR BLD CALC: 33.8 % (ref 34–48)
HEMOGLOBIN: 10.9 G/DL (ref 11.5–15.5)
INR BLD: 1
MCH RBC QN AUTO: 27.9 PG (ref 26–35)
MCHC RBC AUTO-ENTMCNC: 32.2 % (ref 32–34.5)
MCV RBC AUTO: 86.7 FL (ref 80–99.9)
METER GLUCOSE: 105 MG/DL (ref 74–99)
METER GLUCOSE: 164 MG/DL (ref 74–99)
METER GLUCOSE: 268 MG/DL (ref 74–99)
METER GLUCOSE: 95 MG/DL (ref 74–99)
PDW BLD-RTO: 13 FL (ref 11.5–15)
PLATELET # BLD: 247 E9/L (ref 130–450)
PMV BLD AUTO: 9.8 FL (ref 7–12)
POTASSIUM SERPL-SCNC: 3.4 MMOL/L (ref 3.5–5)
PROTHROMBIN TIME: 11.7 SEC (ref 9.3–12.4)
RBC # BLD: 3.9 E12/L (ref 3.5–5.5)
SODIUM BLD-SCNC: 141 MMOL/L (ref 132–146)
TOTAL PROTEIN: 6.5 G/DL (ref 6.4–8.3)
WBC # BLD: 6.3 E9/L (ref 4.5–11.5)

## 2020-07-28 PROCEDURE — 80053 COMPREHEN METABOLIC PANEL: CPT

## 2020-07-28 PROCEDURE — 2580000003 HC RX 258: Performed by: HOSPITALIST

## 2020-07-28 PROCEDURE — 2580000003 HC RX 258: Performed by: INTERNAL MEDICINE

## 2020-07-28 PROCEDURE — 6360000002 HC RX W HCPCS: Performed by: INTERNAL MEDICINE

## 2020-07-28 PROCEDURE — 85610 PROTHROMBIN TIME: CPT

## 2020-07-28 PROCEDURE — 82962 GLUCOSE BLOOD TEST: CPT

## 2020-07-28 PROCEDURE — 1200000000 HC SEMI PRIVATE

## 2020-07-28 PROCEDURE — 36415 COLL VENOUS BLD VENIPUNCTURE: CPT

## 2020-07-28 PROCEDURE — 6370000000 HC RX 637 (ALT 250 FOR IP): Performed by: INTERNAL MEDICINE

## 2020-07-28 PROCEDURE — 85027 COMPLETE CBC AUTOMATED: CPT

## 2020-07-28 RX ORDER — VALACYCLOVIR HYDROCHLORIDE 500 MG/1
2000 TABLET, FILM COATED ORAL EVERY 12 HOURS
Status: COMPLETED | OUTPATIENT
Start: 2020-07-28 | End: 2020-07-28

## 2020-07-28 RX ORDER — HYDROMORPHONE HYDROCHLORIDE 1 MG/ML
0.5 INJECTION, SOLUTION INTRAMUSCULAR; INTRAVENOUS; SUBCUTANEOUS EVERY 8 HOURS PRN
Status: DISCONTINUED | OUTPATIENT
Start: 2020-07-28 | End: 2020-07-31

## 2020-07-28 RX ORDER — POTASSIUM CHLORIDE 20 MEQ/1
40 TABLET, EXTENDED RELEASE ORAL ONCE
Status: COMPLETED | OUTPATIENT
Start: 2020-07-28 | End: 2020-07-28

## 2020-07-28 RX ORDER — METOPROLOL SUCCINATE 25 MG/1
25 TABLET, EXTENDED RELEASE ORAL 2 TIMES DAILY
Status: DISCONTINUED | OUTPATIENT
Start: 2020-07-28 | End: 2020-08-01 | Stop reason: HOSPADM

## 2020-07-28 RX ADMIN — ASPIRIN 81 MG CHEWABLE TABLET 81 MG: 81 TABLET CHEWABLE at 21:46

## 2020-07-28 RX ADMIN — URSODIOL 300 MG: 300 CAPSULE ORAL at 10:09

## 2020-07-28 RX ADMIN — OMEGA-3-ACID ETHYL ESTERS 2 G: 1 CAPSULE, LIQUID FILLED ORAL at 10:08

## 2020-07-28 RX ADMIN — POTASSIUM CHLORIDE 40 MEQ: 20 TABLET, EXTENDED RELEASE ORAL at 18:47

## 2020-07-28 RX ADMIN — PANTOPRAZOLE SODIUM 40 MG: 40 TABLET, DELAYED RELEASE ORAL at 21:46

## 2020-07-28 RX ADMIN — VALACYCLOVIR HYDROCHLORIDE 2000 MG: 500 TABLET, FILM COATED ORAL at 23:53

## 2020-07-28 RX ADMIN — HYDROMORPHONE HYDROCHLORIDE 0.5 MG: 1 INJECTION, SOLUTION INTRAMUSCULAR; INTRAVENOUS; SUBCUTANEOUS at 13:33

## 2020-07-28 RX ADMIN — PROMETHAZINE HYDROCHLORIDE 12.5 MG: 25 TABLET ORAL at 13:37

## 2020-07-28 RX ADMIN — URSODIOL 300 MG: 300 CAPSULE ORAL at 21:46

## 2020-07-28 RX ADMIN — SODIUM CHLORIDE: 9 INJECTION, SOLUTION INTRAVENOUS at 21:44

## 2020-07-28 RX ADMIN — INSULIN GLARGINE 30 UNITS: 100 INJECTION, SOLUTION SUBCUTANEOUS at 21:50

## 2020-07-28 RX ADMIN — PIPERACILLIN AND TAZOBACTAM 3.38 G: 3; .375 INJECTION, POWDER, FOR SOLUTION INTRAVENOUS at 06:19

## 2020-07-28 RX ADMIN — PIPERACILLIN AND TAZOBACTAM 3.38 G: 3; .375 INJECTION, POWDER, FOR SOLUTION INTRAVENOUS at 16:43

## 2020-07-28 RX ADMIN — FLUOXETINE 20 MG: 20 CAPSULE ORAL at 10:09

## 2020-07-28 RX ADMIN — TRIAMTERENE AND HYDROCHLOROTHIAZIDE 2 TABLET: 37.5; 25 TABLET ORAL at 11:15

## 2020-07-28 RX ADMIN — PANTOPRAZOLE SODIUM 40 MG: 40 TABLET, DELAYED RELEASE ORAL at 10:09

## 2020-07-28 RX ADMIN — DIPHENHYDRAMINE HYDROCHLORIDE 50 MG: 25 TABLET ORAL at 21:46

## 2020-07-28 RX ADMIN — ENOXAPARIN SODIUM 40 MG: 40 INJECTION SUBCUTANEOUS at 18:48

## 2020-07-28 RX ADMIN — OMEGA-3-ACID ETHYL ESTERS 2 G: 1 CAPSULE, LIQUID FILLED ORAL at 21:46

## 2020-07-28 RX ADMIN — VALACYCLOVIR HYDROCHLORIDE 2000 MG: 500 TABLET, FILM COATED ORAL at 13:03

## 2020-07-28 RX ADMIN — METOPROLOL SUCCINATE 25 MG: 25 TABLET, EXTENDED RELEASE ORAL at 21:46

## 2020-07-28 RX ADMIN — SODIUM CHLORIDE: 9 INJECTION, SOLUTION INTRAVENOUS at 10:52

## 2020-07-28 RX ADMIN — SODIUM CHLORIDE: 9 INJECTION, SOLUTION INTRAVENOUS at 11:46

## 2020-07-28 RX ADMIN — INSULIN LISPRO 3 UNITS: 100 INJECTION, SOLUTION INTRAVENOUS; SUBCUTANEOUS at 21:50

## 2020-07-28 RX ADMIN — TIZANIDINE 4 MG: 4 TABLET ORAL at 21:46

## 2020-07-28 RX ADMIN — SODIUM CHLORIDE: 9 INJECTION, SOLUTION INTRAVENOUS at 00:57

## 2020-07-28 RX ADMIN — CLOPIDOGREL BISULFATE 75 MG: 75 TABLET ORAL at 10:09

## 2020-07-28 RX ADMIN — METOPROLOL SUCCINATE 50 MG: 50 TABLET, EXTENDED RELEASE ORAL at 10:09

## 2020-07-28 ASSESSMENT — PAIN SCALES - GENERAL
PAINLEVEL_OUTOF10: 0
PAINLEVEL_OUTOF10: 8

## 2020-07-28 NOTE — PROGRESS NOTES
PROGRESS NOTE    By Bunny Quintanilla D.O., GI Fellow    [facility] Toyin Pardo M.D., Dr. Jim Sher M.D., Dr. Nehemiah Cazares, Dr. Ranjana Brooks M.D., Dr. Cesar Bloom  48 y.o.  female    SUBJECTIVE:    No acute events overnight. Patient tolerated diet, endorses resolution of abdominal discomfort. Discussed findings on MRCP, morning, labs and plans to trend labs and possible ERCP if LFT's don't continue to resolve.  Patient voiced understanding and agreement    OBJECTIVE:    BP (!) 98/58   Pulse 61   Temp 97.8 °F (36.6 °C) (Oral)   Resp 17   Ht 5' 3\" (1.6 m)   Wt 224 lb (101.6 kg)   SpO2 94%   BMI 39.68 kg/m²   GEN: A&Ox3, friendly, NAD  HEENT:PEERL, no icterus  Heart: RRR  Lungs: CTAB  Abd.: soft, NT, ND, BS +, no G/R, no HSM  Extr.: no C/C/E, no brusing         Lab Results   Component Value Date    WBC 6.3 07/28/2020    WBC 9.3 07/27/2020    WBC 8.4 07/26/2020    HGB 10.9 07/28/2020    HGB 12.3 07/27/2020    HGB 13.2 07/26/2020    HCT 33.8 07/28/2020    MCV 86.7 07/28/2020    RDW 13.0 07/28/2020     07/28/2020     07/27/2020     07/26/2020     Lab Results   Component Value Date     07/28/2020    K 3.4 07/28/2020    K 3.7 07/27/2020     07/28/2020    CO2 25 07/28/2020    BUN 10 07/28/2020    CREATININE 1.1 07/28/2020    CALCIUM 8.3 07/28/2020    PROT 6.5 07/28/2020    LABALBU 3.3 07/28/2020    LABALBU 4.0 10/27/2011    BILITOT 0.5 07/28/2020    BILITOT 1.5 07/27/2020    BILITOT 1.1 07/26/2020    ALKPHOS 177 07/28/2020    ALKPHOS 234 07/27/2020    ALKPHOS 213 07/26/2020    AST 50 07/28/2020     07/27/2020     07/26/2020    ALT 90 07/28/2020     07/27/2020     07/26/2020     Lab Results   Component Value Date    LIPASE 43 07/26/2020    LIPASE 58 01/02/2020    LIPASE 34 01/18/2019     Lab Results   Component Value Date    AMYLASE 18 01/18/2019         ASSESSMENT/PLAN:  1. Abdominal pain with elevated LFT's and history of recurrent choledocholithiasis  -Mixed  -LFT's improved this morning   -Differential is broad, suspect structural etiology of the CBD vs DILI with recent statin use vs transient biliary sludge  -MRCP on 07-27-20 revealed no intrahepatic ductal dilation. .common bile duct 8-9 mm. ..distal bile duct with 9 mm filling defect   -RUQ US demonstrating a questionable 4 mm echogenic focus in the CBD with no associated intra or extrahepatic ductal dilation.  -Last ERCP on 01-04-20 with plastic stent placement with subsequent EGD for removal   -Continue lita  -Zosyn per primary   -PPI BID  -Patient on Plavix and ASA after recent PCI in   -No acute signs of an obstructive process or clinical deterioration   -Okay for general diet today and NPO after midnight.   -Morning labs, if LFT's are elevated on AM labs will likely plan for ERCP tomorrow  -Continue supportive care         2. History of PUD  -PPI BID        3. DM/hypertension/hyperlipidemia/CAD s/p PCI in /sleep apnea/ MTHFR mutation  -per primary      -Will discuss with attending      Ирина Covarrubias DO  7/28/2020  10:05 AM    Pt seen and independently examined. Pertinent notes and lab work reviewed. MRI   -images reviewed/report read -  showing choledocholithiasis  D/w Dr. Zuniga Reasons with physical exam and A&P. Discussed with patient -patient reports multiple ERCPs in the past (8) with most recent in February. She would like to receive her care locally as her social situation makes it harder for her to travel to tertiary care center such as [facility].     Paty Millan MD  7/28/2020  2:11 PM

## 2020-07-28 NOTE — PROGRESS NOTES
Subjective:  Odette Cross was seen and examined at bedside today. The patient's questions were answered and tests were reviewed. There were no new problems reported overnight. Patient is tolerating current diet. No further pain or fevers  MRCP results reviewed    A complete review of systems and social history was completed on admission and remains unchanged unless otherwise noted    Scheduled Meds:   metoprolol succinate  50 mg Oral BID    FLUoxetine  20 mg Oral Daily    aspirin  81 mg Oral Nightly    triamterene-hydroCHLOROthiazide  2 tablet Oral Daily    pantoprazole  40 mg Oral BID    tiZANidine  4 mg Oral Nightly    ursodiol  300 mg Oral BID    clopidogrel  75 mg Oral Daily    sodium chloride flush  10 mL Intravenous 2 times per day    enoxaparin  40 mg Subcutaneous Daily    insulin lispro  0-12 Units Subcutaneous TID WC    insulin lispro  0-6 Units Subcutaneous Nightly    omega-3 acid ethyl esters  2 g Oral BID    insulin glargine  30 Units Subcutaneous Nightly    diphenhydrAMINE  50 mg Oral Nightly    piperacillin-tazobactam  3.375 g Intravenous Q8H     Continuous Infusions:   dextrose      sodium chloride 75 mL/hr at 07/27/20 0907    sodium chloride Stopped (07/27/20 1108)     PRN Meds:fluticasone, sodium chloride flush, acetaminophen **OR** acetaminophen, polyethylene glycol, promethazine **OR** ondansetron, glucose, dextrose, glucagon (rDNA), dextrose, HYDROmorphone    Objective:  /79   Pulse 70   Temp 98.1 °F (36.7 °C) (Oral)   Resp 18   Ht 5' 3\" (1.6 m)   Wt 224 lb (101.6 kg)   SpO2 95%   BMI 39.68 kg/m²   In: 0   Out: 400    In: 0   Out: 400 [Urine:400]     AAO x 3, currently in NAD  RRR, pos S1, S2  CTA bilaterally, no wheeze, rales or rhonchi  bowel sounds present, nontender, nondistended  No clubbing, cyanosis, or edema  No neuro changes   No obvious rashes or lesions.     Recent Labs     07/26/20  1428 07/27/20  0443   WBC 8.4 9.3   HGB 13.2 12.3    295     Recent Labs     07/26/20  1428 07/27/20  0443    141   K 4.3 3.7   CL 99 100   CO2 26 26   BUN 15 15   CREATININE 1.0 1.1*   GLUCOSE 169* 98     Recent Labs     07/26/20  1428 07/27/20  0443   BILITOT 1.1 1.5*   ALKPHOS 213* 234*   * 190*   * 162*     Recent Labs     07/27/20  1334   INR 1.1     No results for input(s): CKTOTAL, CKMB, CKMBINDEX, TROPONINI in the last 72 hours. Mri Abdomen W Wo Contrast Mrcp    Result Date: 7/27/2020  EXAMINATION: MRI OF THE ABDOMEN WITH AND WITHOUT CONTRAST AND MRCP 7/27/2020 4:22 pm TECHNIQUE: Multiplanar multisequence MRI of the abdomen was performed with and without the administration of intravenous contrast.  After initial T2 axial and coronal images, thick slab, thin slab and 3D coronal MRCP sequences were obtained without the administration of intravenous contrast.  MIP images are provided for review. COMPARISON: Ultrasound 7/26/2020, CT abdomen and pelvis 1/2/2020 HISTORY: ORDERING SYSTEM PROVIDED HISTORY: Elevated LFT\"s and abnormal RUQ US TECHNOLOGIST PROVIDED HISTORY: Reason for exam:->Elevated LFT\"s and abnormal RUQ US FINDINGS: Lung bases are clear. No focal liver lesion. Loss of signal on out of phase imaging is consistent with hepatic steatosis. Gallbladder: Cholecystectomy. Bile Ducts: No intrahepatic ductal dilatation. Common bile duct is within normal limits for prior cholecystectomy measuring up to 8-9 mm. Within the distal common bile duct, there is a 9 mm filling defect. Pancreatic Duct: Pancreatic duct is within normal limits for size. No pancreatic lesion. Other:  No splenomegaly. No adrenal lesion. No hydronephrosis. Left renal cyst.  No free fluid within the abdomen. No evidence of bowel obstruction. No soft tissue abnormality. No suspicious bone marrow replacing lesion. Postcontrast imaging demonstrates no abnormal enhancement. Choledocholithiasis with a 9 mm calculus within the distal common bile duct.      Us Abdomen Limited    Result Date: 7/26/2020  EXAMINATION: RIGHT UPPER QUADRANT ULTRASOUND 7/26/2020 3:26 pm COMPARISON: January 5, 2020 HISTORY: ORDERING SYSTEM PROVIDED HISTORY: RUQ pain, h/o cholecystectomy TECHNOLOGIST PROVIDED HISTORY: Reason for exam:->RUQ pain, h/o cholecystectomy Reason for exam:->h/o choledocho, recurrent stones. r/o dilated ducts or stone. Acute right upper quadrant abdominal pain. Prior cholecystectomy. FINDINGS: LIVER:  Increased echogenicity of the liver. No focal hepatic lesion or evidence of intrahepatic ductal dilatation. BILIARY SYSTEM:  Cholecystectomy. Common bile duct is within normal limits measuring 0.5 cm. There is an approximately 4 mm echogenic focus in the common bile duct with no appreciable shadowing. RIGHT KIDNEY: The right kidney is grossly unremarkable without evidence of hydronephrosis. PANCREAS:  Visualized portions of the pancreas are unremarkable. OTHER: No evidence of right upper quadrant ascites. 1. No convincing finding to account for patient's right upper quadrant abdominal pain. There is a questionable 4 mm echogenic focus in the common bile duct with no associated intra or extrahepatic ductal dilatation. Differential includes ductal debris and retained stone. If there is clinical concern for choledocholithiasis, consider MRCP for further evaluation. 2. Hepatic steatosis. 3. Cholecystectomy. Assessment:  Ricarda Peters is a 48y.o. year old female who presented on 7/26/2020 and is being treated for:  Principal Problem:    Choledocholithiasis  Active Problems:    Elevated LFTs    Abdominal pain    Nausea and vomiting in adult    MTHFR mutation ([address]    Diabetes mellitus ([address]    CAD in native artery  Resolved Problems:    * No resolved hospital problems. *    Plan  · ERCP planned  · Otherwise continue current therapy including ivf and iv abx in the meantime  · Please see orders for further management and care.     Janeen Mahan MD  7:40 PM  7/27/2020

## 2020-07-28 NOTE — PLAN OF CARE
Problem: Pain:  Goal: Pain level will decrease  Description: Pain level will decrease  Outcome: Met This Shift  Goal: Control of acute pain  Description: Control of acute pain  Outcome: Met This Shift     Problem: Nausea/Vomiting:  Goal: Absence of nausea/vomiting  Description: Absence of nausea/vomiting  Outcome: Met This Shift  Goal: Able to drink  Description: Able to drink  Outcome: Met This Shift  Goal: Able to eat  Description: Able to eat  Outcome: Met This Shift  Goal: Ability to achieve adequate nutritional intake will improve  Description: Ability to achieve adequate nutritional intake will improve  Outcome: Met This Shift

## 2020-07-28 NOTE — PROGRESS NOTES
Subjective:  Kentrell Hawthorne was seen and examined at bedside today. The patient's questions were answered and tests were reviewed. There were no new problems reported overnight. Patient had an issue with diet earlier today causing abd pain  Also felt lightheaded    A complete review of systems and social history was completed on admission and remains unchanged unless otherwise noted    Scheduled Meds:   valACYclovir  2,000 mg Oral Q12H    metoprolol succinate  50 mg Oral BID    FLUoxetine  20 mg Oral Daily    aspirin  81 mg Oral Nightly    pantoprazole  40 mg Oral BID    tiZANidine  4 mg Oral Nightly    ursodiol  300 mg Oral BID    clopidogrel  75 mg Oral Daily    sodium chloride flush  10 mL Intravenous 2 times per day    enoxaparin  40 mg Subcutaneous Daily    insulin lispro  0-12 Units Subcutaneous TID WC    insulin lispro  0-6 Units Subcutaneous Nightly    omega-3 acid ethyl esters  2 g Oral BID    insulin glargine  30 Units Subcutaneous Nightly    diphenhydrAMINE  50 mg Oral Nightly    piperacillin-tazobactam  3.375 g Intravenous Q8H     Continuous Infusions:   dextrose      sodium chloride 100 mL/hr at 07/28/20 1146    sodium chloride Stopped (07/28/20 1304)     PRN Meds:fluticasone, sodium chloride flush, acetaminophen **OR** acetaminophen, polyethylene glycol, promethazine **OR** ondansetron, glucose, dextrose, glucagon (rDNA), dextrose, HYDROmorphone    Objective:  /68   Pulse 69   Temp 97.8 °F (36.6 °C)   Resp 18   Ht 5' 3\" (1.6 m)   Wt 224 lb (101.6 kg)   SpO2 96%   BMI 39.68 kg/m²   In: 720 [P.O.:720]  Out: 1150    In: 720   Out: 1150 [Urine:1150]     AAO x 3, currently in NAD  RRR, pos S1, S2  CTA bilaterally, no wheeze, rales or rhonchi  bowel sounds present, mild abd tenderness  No clubbing, cyanosis, or edema  No neuro changes   No obvious rashes or lesions.     Recent Labs     07/26/20  1428 07/27/20  0443 07/28/20  0742   WBC 8.4 9.3 6.3   HGB 13.2 12.3 10.9*    295 247     Recent Labs     07/26/20  1428 07/27/20  0443 07/28/20  0742    141 141   K 4.3 3.7 3.4*   CL 99 100 103   CO2 26 26 25   BUN 15 15 10   CREATININE 1.0 1.1* 1.1*   GLUCOSE 169* 98 89     Recent Labs     07/26/20  1428 07/27/20  0443 07/28/20  0742   BILITOT 1.1 1.5* 0.5   ALKPHOS 213* 234* 177*   * 190* 50*   * 162* 90*     Recent Labs     07/27/20  1334 07/28/20  0742   INR 1.1 1.0     No results for input(s): CKTOTAL, CKMB, CKMBINDEX, TROPONINI in the last 72 hours. Mri Abdomen W Wo Contrast Mrcp    Result Date: 7/27/2020  EXAMINATION: MRI OF THE ABDOMEN WITH AND WITHOUT CONTRAST AND MRCP 7/27/2020 4:22 pm TECHNIQUE: Multiplanar multisequence MRI of the abdomen was performed with and without the administration of intravenous contrast.  After initial T2 axial and coronal images, thick slab, thin slab and 3D coronal MRCP sequences were obtained without the administration of intravenous contrast.  MIP images are provided for review. COMPARISON: Ultrasound 7/26/2020, CT abdomen and pelvis 1/2/2020 HISTORY: ORDERING SYSTEM PROVIDED HISTORY: Elevated LFT\"s and abnormal RUQ US TECHNOLOGIST PROVIDED HISTORY: Reason for exam:->Elevated LFT\"s and abnormal RUQ US FINDINGS: Lung bases are clear. No focal liver lesion. Loss of signal on out of phase imaging is consistent with hepatic steatosis. Gallbladder: Cholecystectomy. Bile Ducts: No intrahepatic ductal dilatation. Common bile duct is within normal limits for prior cholecystectomy measuring up to 8-9 mm. Within the distal common bile duct, there is a 9 mm filling defect. Pancreatic Duct: Pancreatic duct is within normal limits for size. No pancreatic lesion. Other:  No splenomegaly. No adrenal lesion. No hydronephrosis. Left renal cyst.  No free fluid within the abdomen. No evidence of bowel obstruction. No soft tissue abnormality. No suspicious bone marrow replacing lesion.  Postcontrast imaging demonstrates no abnormal enhancement. Choledocholithiasis with a 9 mm calculus within the distal common bile duct. Us Abdomen Limited    Result Date: 7/26/2020  EXAMINATION: RIGHT UPPER QUADRANT ULTRASOUND 7/26/2020 3:26 pm COMPARISON: January 5, 2020 HISTORY: ORDERING SYSTEM PROVIDED HISTORY: RUQ pain, h/o cholecystectomy TECHNOLOGIST PROVIDED HISTORY: Reason for exam:->RUQ pain, h/o cholecystectomy Reason for exam:->h/o choledocho, recurrent stones. r/o dilated ducts or stone. Acute right upper quadrant abdominal pain. Prior cholecystectomy. FINDINGS: LIVER:  Increased echogenicity of the liver. No focal hepatic lesion or evidence of intrahepatic ductal dilatation. BILIARY SYSTEM:  Cholecystectomy. Common bile duct is within normal limits measuring 0.5 cm. There is an approximately 4 mm echogenic focus in the common bile duct with no appreciable shadowing. RIGHT KIDNEY: The right kidney is grossly unremarkable without evidence of hydronephrosis. PANCREAS:  Visualized portions of the pancreas are unremarkable. OTHER: No evidence of right upper quadrant ascites. 1. No convincing finding to account for patient's right upper quadrant abdominal pain. There is a questionable 4 mm echogenic focus in the common bile duct with no associated intra or extrahepatic ductal dilatation. Differential includes ductal debris and retained stone. If there is clinical concern for choledocholithiasis, consider MRCP for further evaluation. 2. Hepatic steatosis. 3. Cholecystectomy. Assessment:  Subha Garrett is a 48y.o. year old female who presented on 7/26/2020 and is being treated for:  Principal Problem:    Choledocholithiasis  Active Problems:    Elevated LFTs    Abdominal pain    Nausea and vomiting in adult    MTHFR mutation ([address]    Diabetes mellitus ([address]    CAD in native artery  Resolved Problems:    * No resolved hospital problems.  *    Plan  · Replace K  · Decrease bp meds  · Cont ivf but restart clears for now  · May get ERCP pending labs/symptoms  · Please see orders for further management and care.     Sheri Whitney MD  6:25 PM  7/28/2020

## 2020-07-29 LAB
ALBUMIN SERPL-MCNC: 3.2 G/DL (ref 3.5–5.2)
ALP BLD-CCNC: 236 U/L (ref 35–104)
ALT SERPL-CCNC: 98 U/L (ref 0–32)
ANION GAP SERPL CALCULATED.3IONS-SCNC: 13 MMOL/L (ref 7–16)
AST SERPL-CCNC: 65 U/L (ref 0–31)
BASOPHILS ABSOLUTE: 0.05 E9/L (ref 0–0.2)
BASOPHILS RELATIVE PERCENT: 1.1 % (ref 0–2)
BILIRUB SERPL-MCNC: 0.5 MG/DL (ref 0–1.2)
BUN BLDV-MCNC: 7 MG/DL (ref 6–20)
CALCIUM SERPL-MCNC: 8.8 MG/DL (ref 8.6–10.2)
CHLORIDE BLD-SCNC: 105 MMOL/L (ref 98–107)
CO2: 21 MMOL/L (ref 22–29)
CREAT SERPL-MCNC: 1 MG/DL (ref 0.5–1)
EOSINOPHILS ABSOLUTE: 0.13 E9/L (ref 0.05–0.5)
EOSINOPHILS RELATIVE PERCENT: 2.8 % (ref 0–6)
GFR AFRICAN AMERICAN: >60
GFR NON-AFRICAN AMERICAN: 59 ML/MIN/1.73
GLUCOSE BLD-MCNC: 107 MG/DL (ref 74–99)
HCT VFR BLD CALC: 34.1 % (ref 34–48)
HEMOGLOBIN: 11.1 G/DL (ref 11.5–15.5)
IMMATURE GRANULOCYTES #: 0.01 E9/L
IMMATURE GRANULOCYTES %: 0.2 % (ref 0–5)
INR BLD: 1
LYMPHOCYTES ABSOLUTE: 1.27 E9/L (ref 1.5–4)
LYMPHOCYTES RELATIVE PERCENT: 27.7 % (ref 20–42)
MCH RBC QN AUTO: 28.2 PG (ref 26–35)
MCHC RBC AUTO-ENTMCNC: 32.6 % (ref 32–34.5)
MCV RBC AUTO: 86.5 FL (ref 80–99.9)
METER GLUCOSE: 115 MG/DL (ref 74–99)
METER GLUCOSE: 204 MG/DL (ref 74–99)
METER GLUCOSE: 89 MG/DL (ref 74–99)
METER GLUCOSE: 95 MG/DL (ref 74–99)
MONOCYTES ABSOLUTE: 0.3 E9/L (ref 0.1–0.95)
MONOCYTES RELATIVE PERCENT: 6.6 % (ref 2–12)
NEUTROPHILS ABSOLUTE: 2.82 E9/L (ref 1.8–7.3)
NEUTROPHILS RELATIVE PERCENT: 61.6 % (ref 43–80)
PDW BLD-RTO: 12.8 FL (ref 11.5–15)
PLATELET # BLD: 292 E9/L (ref 130–450)
PMV BLD AUTO: 9.8 FL (ref 7–12)
POTASSIUM SERPL-SCNC: 3.8 MMOL/L (ref 3.5–5)
PROTHROMBIN TIME: 11.7 SEC (ref 9.3–12.4)
RBC # BLD: 3.94 E12/L (ref 3.5–5.5)
SODIUM BLD-SCNC: 139 MMOL/L (ref 132–146)
TOTAL PROTEIN: 6.5 G/DL (ref 6.4–8.3)
WBC # BLD: 4.6 E9/L (ref 4.5–11.5)

## 2020-07-29 PROCEDURE — 85025 COMPLETE CBC W/AUTO DIFF WBC: CPT

## 2020-07-29 PROCEDURE — 80053 COMPREHEN METABOLIC PANEL: CPT

## 2020-07-29 PROCEDURE — 6370000000 HC RX 637 (ALT 250 FOR IP): Performed by: INTERNAL MEDICINE

## 2020-07-29 PROCEDURE — 2580000003 HC RX 258: Performed by: HOSPITALIST

## 2020-07-29 PROCEDURE — 82962 GLUCOSE BLOOD TEST: CPT

## 2020-07-29 PROCEDURE — 2580000003 HC RX 258: Performed by: INTERNAL MEDICINE

## 2020-07-29 PROCEDURE — 6360000002 HC RX W HCPCS: Performed by: INTERNAL MEDICINE

## 2020-07-29 PROCEDURE — 85610 PROTHROMBIN TIME: CPT

## 2020-07-29 PROCEDURE — 1200000000 HC SEMI PRIVATE

## 2020-07-29 PROCEDURE — 36415 COLL VENOUS BLD VENIPUNCTURE: CPT

## 2020-07-29 RX ADMIN — SODIUM CHLORIDE: 9 INJECTION, SOLUTION INTRAVENOUS at 12:37

## 2020-07-29 RX ADMIN — METOPROLOL SUCCINATE 25 MG: 25 TABLET, EXTENDED RELEASE ORAL at 09:02

## 2020-07-29 RX ADMIN — SODIUM CHLORIDE: 9 INJECTION, SOLUTION INTRAVENOUS at 20:35

## 2020-07-29 RX ADMIN — URSODIOL 300 MG: 300 CAPSULE ORAL at 20:35

## 2020-07-29 RX ADMIN — METOPROLOL SUCCINATE 25 MG: 25 TABLET, EXTENDED RELEASE ORAL at 20:35

## 2020-07-29 RX ADMIN — ENOXAPARIN SODIUM 40 MG: 40 INJECTION SUBCUTANEOUS at 17:07

## 2020-07-29 RX ADMIN — SODIUM CHLORIDE: 9 INJECTION, SOLUTION INTRAVENOUS at 14:15

## 2020-07-29 RX ADMIN — INSULIN GLARGINE 30 UNITS: 100 INJECTION, SOLUTION SUBCUTANEOUS at 21:35

## 2020-07-29 RX ADMIN — TIZANIDINE 4 MG: 4 TABLET ORAL at 20:35

## 2020-07-29 RX ADMIN — PIPERACILLIN AND TAZOBACTAM 3.38 G: 3; .375 INJECTION, POWDER, FOR SOLUTION INTRAVENOUS at 01:15

## 2020-07-29 RX ADMIN — FLUOXETINE 20 MG: 20 CAPSULE ORAL at 09:02

## 2020-07-29 RX ADMIN — PANTOPRAZOLE SODIUM 40 MG: 40 TABLET, DELAYED RELEASE ORAL at 09:01

## 2020-07-29 RX ADMIN — DIPHENHYDRAMINE HYDROCHLORIDE 50 MG: 25 TABLET ORAL at 20:35

## 2020-07-29 RX ADMIN — URSODIOL 300 MG: 300 CAPSULE ORAL at 09:02

## 2020-07-29 RX ADMIN — SODIUM CHLORIDE: 9 INJECTION, SOLUTION INTRAVENOUS at 05:58

## 2020-07-29 RX ADMIN — OMEGA-3-ACID ETHYL ESTERS 2 G: 1 CAPSULE, LIQUID FILLED ORAL at 21:13

## 2020-07-29 RX ADMIN — PANTOPRAZOLE SODIUM 40 MG: 40 TABLET, DELAYED RELEASE ORAL at 20:35

## 2020-07-29 RX ADMIN — PIPERACILLIN AND TAZOBACTAM 3.38 G: 3; .375 INJECTION, POWDER, FOR SOLUTION INTRAVENOUS at 16:11

## 2020-07-29 RX ADMIN — ASPIRIN 81 MG CHEWABLE TABLET 81 MG: 81 TABLET CHEWABLE at 20:35

## 2020-07-29 RX ADMIN — PIPERACILLIN AND TAZOBACTAM 3.38 G: 3; .375 INJECTION, POWDER, FOR SOLUTION INTRAVENOUS at 09:05

## 2020-07-29 NOTE — PROGRESS NOTES
Subjective:  Rosina Prajapati was seen and examined at bedside today. The patient's questions were answered and tests were reviewed. There were no new problems reported overnight. Patient is tolerating current diet. No recurrence of pain    A complete review of systems and social history was completed on admission and remains unchanged unless otherwise noted    Scheduled Meds:   metoprolol succinate  25 mg Oral BID    FLUoxetine  20 mg Oral Daily    aspirin  81 mg Oral Nightly    pantoprazole  40 mg Oral BID    tiZANidine  4 mg Oral Nightly    ursodiol  300 mg Oral BID    clopidogrel  75 mg Oral Daily    sodium chloride flush  10 mL Intravenous 2 times per day    enoxaparin  40 mg Subcutaneous Daily    insulin lispro  0-12 Units Subcutaneous TID WC    insulin lispro  0-6 Units Subcutaneous Nightly    omega-3 acid ethyl esters  2 g Oral BID    insulin glargine  30 Units Subcutaneous Nightly    diphenhydrAMINE  50 mg Oral Nightly    piperacillin-tazobactam  3.375 g Intravenous Q8H     Continuous Infusions:   dextrose      sodium chloride 100 mL/hr at 07/29/20 1415    sodium chloride Stopped (07/29/20 1437)     PRN Meds:HYDROmorphone, fluticasone, sodium chloride flush, acetaminophen **OR** acetaminophen, polyethylene glycol, promethazine **OR** ondansetron, glucose, dextrose, glucagon (rDNA), dextrose    Objective:  /68   Pulse 66   Temp 98.6 °F (37 °C) (Oral)   Resp 16   Ht 5' 3\" (1.6 m)   Wt 224 lb (101.6 kg)   SpO2 94%   BMI 39.68 kg/m²   In: 1200 [I.V.:1200]  Out: 2700    In: 1200   Out: 2700 [Urine:1100]     AAO x 3, currently in NAD  RRR, pos S1, S2  CTA bilaterally, no wheeze, rales or rhonchi  bowel sounds present, nontender, nondistended  No clubbing, cyanosis, or edema  No neuro changes   No obvious rashes or lesions.     Recent Labs     07/27/20  0443 07/28/20  0742 07/29/20  0544   WBC 9.3 6.3 4.6   HGB 12.3 10.9* 11.1*    247 292     Recent Labs     07/27/20 0443 07/28/20  0742 07/29/20  0544    141 139   K 3.7 3.4* 3.8    103 105   CO2 26 25 21*   BUN 15 10 7   CREATININE 1.1* 1.1* 1.0   GLUCOSE 98 89 107*     Recent Labs     07/27/20  0443 07/28/20  0742 07/29/20  0544   BILITOT 1.5* 0.5 0.5   ALKPHOS 234* 177* 236*   * 50* 65*   * 90* 98*     Recent Labs     07/27/20  1334 07/28/20  0742 07/29/20  0544   INR 1.1 1.0 1.0     No results for input(s): CKTOTAL, CKMB, CKMBINDEX, TROPONINI in the last 72 hours. Mri Abdomen W Wo Contrast Mrcp    Result Date: 7/27/2020  EXAMINATION: MRI OF THE ABDOMEN WITH AND WITHOUT CONTRAST AND MRCP 7/27/2020 4:22 pm TECHNIQUE: Multiplanar multisequence MRI of the abdomen was performed with and without the administration of intravenous contrast.  After initial T2 axial and coronal images, thick slab, thin slab and 3D coronal MRCP sequences were obtained without the administration of intravenous contrast.  MIP images are provided for review. COMPARISON: Ultrasound 7/26/2020, CT abdomen and pelvis 1/2/2020 HISTORY: ORDERING SYSTEM PROVIDED HISTORY: Elevated LFT\"s and abnormal RUQ US TECHNOLOGIST PROVIDED HISTORY: Reason for exam:->Elevated LFT\"s and abnormal RUQ US FINDINGS: Lung bases are clear. No focal liver lesion. Loss of signal on out of phase imaging is consistent with hepatic steatosis. Gallbladder: Cholecystectomy. Bile Ducts: No intrahepatic ductal dilatation. Common bile duct is within normal limits for prior cholecystectomy measuring up to 8-9 mm. Within the distal common bile duct, there is a 9 mm filling defect. Pancreatic Duct: Pancreatic duct is within normal limits for size. No pancreatic lesion. Other:  No splenomegaly. No adrenal lesion. No hydronephrosis. Left renal cyst.  No free fluid within the abdomen. No evidence of bowel obstruction. No soft tissue abnormality. No suspicious bone marrow replacing lesion. Postcontrast imaging demonstrates no abnormal enhancement. Choledocholithiasis with a 9 mm calculus within the distal common bile duct. Us Abdomen Limited    Result Date: 7/26/2020  EXAMINATION: RIGHT UPPER QUADRANT ULTRASOUND 7/26/2020 3:26 pm COMPARISON: January 5, 2020 HISTORY: ORDERING SYSTEM PROVIDED HISTORY: RUQ pain, h/o cholecystectomy TECHNOLOGIST PROVIDED HISTORY: Reason for exam:->RUQ pain, h/o cholecystectomy Reason for exam:->h/o choledocho, recurrent stones. r/o dilated ducts or stone. Acute right upper quadrant abdominal pain. Prior cholecystectomy. FINDINGS: LIVER:  Increased echogenicity of the liver. No focal hepatic lesion or evidence of intrahepatic ductal dilatation. BILIARY SYSTEM:  Cholecystectomy. Common bile duct is within normal limits measuring 0.5 cm. There is an approximately 4 mm echogenic focus in the common bile duct with no appreciable shadowing. RIGHT KIDNEY: The right kidney is grossly unremarkable without evidence of hydronephrosis. PANCREAS:  Visualized portions of the pancreas are unremarkable. OTHER: No evidence of right upper quadrant ascites. 1. No convincing finding to account for patient's right upper quadrant abdominal pain. There is a questionable 4 mm echogenic focus in the common bile duct with no associated intra or extrahepatic ductal dilatation. Differential includes ductal debris and retained stone. If there is clinical concern for choledocholithiasis, consider MRCP for further evaluation. 2. Hepatic steatosis. 3. Cholecystectomy. Assessment:  Gavi Castillo is a 48y.o. year old female who presented on 7/26/2020 and is being treated for:  Principal Problem:    Choledocholithiasis  Active Problems:    Elevated LFTs    Abdominal pain    Nausea and vomiting in adult    MTHFR mutation ([address]    Diabetes mellitus ([address]    CAD in native artery  Resolved Problems:    * No resolved hospital problems. *    Plan  · Ercp planned for tomorrow  · Can stop ivf  · Otherwise continue current therapy.   · Please see

## 2020-07-29 NOTE — PLAN OF CARE
Problem: Pain:  Goal: Pain level will decrease  Description: Pain level will decrease  Outcome: Met This Shift     Problem: Pain:  Goal: Control of acute pain  Description: Control of acute pain  Outcome: Met This Shift     Problem: Pain:  Goal: Control of chronic pain  Description: Control of chronic pain  Outcome: Met This Shift     Problem: Nausea/Vomiting:  Goal: Absence of nausea/vomiting  Description: Absence of nausea/vomiting  Outcome: Met This Shift     Problem: Nausea/Vomiting:  Goal: Able to drink  Description: Able to drink  Outcome: Met This Shift     Problem: Nausea/Vomiting:  Goal: Able to eat  Description: Able to eat  Outcome: Met This Shift     Problem: Nausea/Vomiting:  Goal: Ability to achieve adequate nutritional intake will improve  Description: Ability to achieve adequate nutritional intake will improve  Outcome: Met This Shift

## 2020-07-29 NOTE — PROGRESS NOTES
PROGRESS NOTE    By Kimberley Olvera D.O., GI Fellow    [facility] and Monalisa Hebert M.D., Dr. Helon Prader, M.D., Dr. Judith Oates., Dr. Eduar Donovan M.D., Dr. Fernando Oliva  48 y.o.  female    SUBJECTIVE:    No acute events overnight. Patient attempted general diet but had an exacerbation of her RUQ abdominal pain. She attempted clears for dinner and tolerated it better. Discussed persistent elevation of LFT's and plans for an ERCP today vs tomorrow. Patient voiced understanding and agreement.       OBJECTIVE:    /68   Pulse 66   Temp 98.6 °F (37 °C) (Oral)   Resp 16   Ht 5' 3\" (1.6 m)   Wt 224 lb (101.6 kg)   SpO2 94%   BMI 39.68 kg/m²   GEN: A&Ox3, friendly, NAD  HEENT:PEERL, no icterus  Heart: RRR  Lungs: CTAB  Abd.: soft, NT, ND, BS +, no G/R, no HSM  Extr.: no C/C/E, no brusing         Lab Results   Component Value Date    WBC 4.6 07/29/2020    WBC 6.3 07/28/2020    WBC 9.3 07/27/2020    HGB 11.1 07/29/2020    HGB 10.9 07/28/2020    HGB 12.3 07/27/2020    HCT 34.1 07/29/2020    MCV 86.5 07/29/2020    RDW 12.8 07/29/2020     07/29/2020     07/28/2020     07/27/2020     Lab Results   Component Value Date     07/29/2020    K 3.8 07/29/2020    K 3.7 07/27/2020     07/29/2020    CO2 21 07/29/2020    BUN 7 07/29/2020    CREATININE 1.0 07/29/2020    CALCIUM 8.8 07/29/2020    PROT 6.5 07/29/2020    LABALBU 3.2 07/29/2020    LABALBU 4.0 10/27/2011    BILITOT 0.5 07/29/2020    BILITOT 0.5 07/28/2020    BILITOT 1.5 07/27/2020    ALKPHOS 236 07/29/2020    ALKPHOS 177 07/28/2020    ALKPHOS 234 07/27/2020    AST 65 07/29/2020    AST 50 07/28/2020     07/27/2020    ALT 98 07/29/2020    ALT 90 07/28/2020     07/27/2020     Lab Results   Component Value Date    LIPASE 43 07/26/2020    LIPASE 58 01/02/2020    LIPASE 34 01/18/2019     Lab Results   Component Value Date    AMYLASE 18 01/18/2019 ASSESSMENT/PLAN:  1. Abdominal pain with elevated LFT's and history of recurrent choledocholithiasis  -Mixed  -LFT's improved this morning   -Differential is broad, suspect structural etiology of the CBD vs DILI with recent statin use vs transient biliary sludge  -MRCP on 07-27-20 revealed no intrahepatic ductal dilation. .common bile duct 8-9 mm. ..distal bile duct with 9 mm filling defect   -RUQ US demonstrating a questionable 4 mm echogenic focus in the CBD with no associated intra or extrahepatic ductal dilation.  -Last ERCP on 01-04-20 with plastic stent placement with subsequent EGD for removal   -Continue lita  -Zosyn per primary   -PPI BID  -Patient on Plavix and ASA after recent PCI in   -No acute signs of an obstructive process or clinical deterioration   -Okay for clear liquids today and NPO after midnight.   -Plan for ERCP tomorrow   -Continue supportive care         2. History of PUD  -PPI BID        3. DM/hypertension/hyperlipidemia/CAD s/p PCI in /sleep apnea/ MTHFR mutation  -per primary      -Will discuss with attending        Ирина Covarrubias DO  7/29/2020  10:58 AM    Pt seen and independently examined. Pertinent notes and lab work reviewed. D/w Dr. Wanda Gibbs with physical exam and A&P. Discussed with patient - all questions answered -explained that secondary to the complexity of her condition ERCP may not solve her problem is still may require further management at tertiary care center patient voices understanding and agreement he reiterates that she would prefer to receive all available care locally secondary to social issues. She is agreeable with the plan as delineated, including plan for ERCP tomorrow.     Ron uMrphy MD  7/29/2020  2:43 PM

## 2020-07-29 NOTE — CARE COORDINATION
CM note: consult for insurance issues noted, call placed to [facility] in public benefits to make financial visit.

## 2020-07-29 NOTE — PLAN OF CARE
Problem: Pain:  Goal: Pain level will decrease  Description: Pain level will decrease  7/29/2020 1054 by Martir Wray RN  Outcome: Met This Shift     Problem: Pain:  Goal: Control of acute pain  Description: Control of acute pain  7/29/2020 1054 by Martir Wray RN  Outcome: Met This Shift     Problem: Pain:  Goal: Control of chronic pain  Description: Control of chronic pain  7/29/2020 1054 by Martir Wray RN  Outcome: Met This Shift     Problem: Nausea/Vomiting:  Goal: Absence of nausea/vomiting  Description: Absence of nausea/vomiting  7/29/2020 1054 by Martir Wray RN  Outcome: Met This Shift     Problem: Nausea/Vomiting:  Goal: Able to drink  Description: Able to drink  7/29/2020 1054 by Martir Wray RN  Outcome: Met This Shift     Problem: Nausea/Vomiting:  Goal: Able to eat  Description: Able to eat  7/29/2020 1054 by Martir Wray RN  Outcome: Met This Shift     Problem: Nausea/Vomiting:  Goal: Ability to achieve adequate nutritional intake will improve  Description: Ability to achieve adequate nutritional intake will improve  7/29/2020 1054 by Martir Wray RN  Outcome: Met This Shift

## 2020-07-30 ENCOUNTER — APPOINTMENT (OUTPATIENT)
Dept: GENERAL RADIOLOGY | Age: 50
DRG: 445 | End: 2020-07-30
Payer: COMMERCIAL

## 2020-07-30 ENCOUNTER — ANESTHESIA EVENT (OUTPATIENT)
Dept: OPERATING ROOM | Age: 50
DRG: 445 | End: 2020-07-30
Payer: COMMERCIAL

## 2020-07-30 ENCOUNTER — ANESTHESIA (OUTPATIENT)
Dept: OPERATING ROOM | Age: 50
DRG: 445 | End: 2020-07-30
Payer: COMMERCIAL

## 2020-07-30 VITALS
DIASTOLIC BLOOD PRESSURE: 133 MMHG | OXYGEN SATURATION: 96 % | SYSTOLIC BLOOD PRESSURE: 195 MMHG | RESPIRATION RATE: 2 BRPM

## 2020-07-30 LAB
ALBUMIN SERPL-MCNC: 3.5 G/DL (ref 3.5–5.2)
ALP BLD-CCNC: 223 U/L (ref 35–104)
ALT SERPL-CCNC: 73 U/L (ref 0–32)
ANION GAP SERPL CALCULATED.3IONS-SCNC: 13 MMOL/L (ref 7–16)
AST SERPL-CCNC: 35 U/L (ref 0–31)
BASOPHILS ABSOLUTE: 0.03 E9/L (ref 0–0.2)
BASOPHILS RELATIVE PERCENT: 0.6 % (ref 0–2)
BILIRUB SERPL-MCNC: 0.5 MG/DL (ref 0–1.2)
BUN BLDV-MCNC: 6 MG/DL (ref 6–20)
CALCIUM SERPL-MCNC: 9 MG/DL (ref 8.6–10.2)
CHLORIDE BLD-SCNC: 105 MMOL/L (ref 98–107)
CO2: 24 MMOL/L (ref 22–29)
CREAT SERPL-MCNC: 1 MG/DL (ref 0.5–1)
EOSINOPHILS ABSOLUTE: 0.15 E9/L (ref 0.05–0.5)
EOSINOPHILS RELATIVE PERCENT: 2.9 % (ref 0–6)
GFR AFRICAN AMERICAN: >60
GFR NON-AFRICAN AMERICAN: 59 ML/MIN/1.73
GLUCOSE BLD-MCNC: 79 MG/DL (ref 74–99)
HCT VFR BLD CALC: 35.4 % (ref 34–48)
HEMOGLOBIN: 11.7 G/DL (ref 11.5–15.5)
IMMATURE GRANULOCYTES #: 0.03 E9/L
IMMATURE GRANULOCYTES %: 0.6 % (ref 0–5)
LYMPHOCYTES ABSOLUTE: 1.91 E9/L (ref 1.5–4)
LYMPHOCYTES RELATIVE PERCENT: 36.5 % (ref 20–42)
MCH RBC QN AUTO: 28.4 PG (ref 26–35)
MCHC RBC AUTO-ENTMCNC: 33.1 % (ref 32–34.5)
MCV RBC AUTO: 85.9 FL (ref 80–99.9)
METER GLUCOSE: 118 MG/DL (ref 74–99)
METER GLUCOSE: 188 MG/DL (ref 74–99)
METER GLUCOSE: 197 MG/DL (ref 74–99)
METER GLUCOSE: 82 MG/DL (ref 74–99)
MONOCYTES ABSOLUTE: 0.37 E9/L (ref 0.1–0.95)
MONOCYTES RELATIVE PERCENT: 7.1 % (ref 2–12)
NEUTROPHILS ABSOLUTE: 2.75 E9/L (ref 1.8–7.3)
NEUTROPHILS RELATIVE PERCENT: 52.3 % (ref 43–80)
PDW BLD-RTO: 13.2 FL (ref 11.5–15)
PLATELET # BLD: 291 E9/L (ref 130–450)
PMV BLD AUTO: 10 FL (ref 7–12)
POTASSIUM SERPL-SCNC: 3.8 MMOL/L (ref 3.5–5)
RBC # BLD: 4.12 E12/L (ref 3.5–5.5)
SODIUM BLD-SCNC: 142 MMOL/L (ref 132–146)
TOTAL PROTEIN: 6.8 G/DL (ref 6.4–8.3)
WBC # BLD: 5.2 E9/L (ref 4.5–11.5)

## 2020-07-30 PROCEDURE — 94640 AIRWAY INHALATION TREATMENT: CPT

## 2020-07-30 PROCEDURE — 85025 COMPLETE CBC W/AUTO DIFF WBC: CPT

## 2020-07-30 PROCEDURE — 6360000002 HC RX W HCPCS: Performed by: INTERNAL MEDICINE

## 2020-07-30 PROCEDURE — 82962 GLUCOSE BLOOD TEST: CPT

## 2020-07-30 PROCEDURE — 0FC98ZZ EXTIRPATION OF MATTER FROM COMMON BILE DUCT, VIA NATURAL OR ARTIFICIAL OPENING ENDOSCOPIC: ICD-10-PCS | Performed by: INTERNAL MEDICINE

## 2020-07-30 PROCEDURE — 36415 COLL VENOUS BLD VENIPUNCTURE: CPT

## 2020-07-30 PROCEDURE — 2500000003 HC RX 250 WO HCPCS: Performed by: NURSE ANESTHETIST, CERTIFIED REGISTERED

## 2020-07-30 PROCEDURE — 6360000002 HC RX W HCPCS: Performed by: NURSE ANESTHETIST, CERTIFIED REGISTERED

## 2020-07-30 PROCEDURE — 80053 COMPREHEN METABOLIC PANEL: CPT

## 2020-07-30 PROCEDURE — 3700000001 HC ADD 15 MINUTES (ANESTHESIA): Performed by: SPECIALIST

## 2020-07-30 PROCEDURE — 6360000002 HC RX W HCPCS

## 2020-07-30 PROCEDURE — C1769 GUIDE WIRE: HCPCS | Performed by: SPECIALIST

## 2020-07-30 PROCEDURE — 2580000003 HC RX 258: Performed by: INTERNAL MEDICINE

## 2020-07-30 PROCEDURE — 2500000003 HC RX 250 WO HCPCS

## 2020-07-30 PROCEDURE — 6370000000 HC RX 637 (ALT 250 FOR IP): Performed by: INTERNAL MEDICINE

## 2020-07-30 PROCEDURE — 2580000003 HC RX 258: Performed by: NURSE ANESTHETIST, CERTIFIED REGISTERED

## 2020-07-30 PROCEDURE — 2709999900 HC NON-CHARGEABLE SUPPLY: Performed by: SPECIALIST

## 2020-07-30 PROCEDURE — BF101ZZ FLUOROSCOPY OF BILE DUCTS USING LOW OSMOLAR CONTRAST: ICD-10-PCS | Performed by: INTERNAL MEDICINE

## 2020-07-30 PROCEDURE — 6360000004 HC RX CONTRAST MEDICATION: Performed by: SPECIALIST

## 2020-07-30 PROCEDURE — 7100000001 HC PACU RECOVERY - ADDTL 15 MIN: Performed by: SPECIALIST

## 2020-07-30 PROCEDURE — 3700000000 HC ANESTHESIA ATTENDED CARE: Performed by: SPECIALIST

## 2020-07-30 PROCEDURE — 7100000000 HC PACU RECOVERY - FIRST 15 MIN: Performed by: SPECIALIST

## 2020-07-30 PROCEDURE — C1726 CATH, BAL DIL, NON-VASCULAR: HCPCS | Performed by: SPECIALIST

## 2020-07-30 PROCEDURE — 2720000010 HC SURG SUPPLY STERILE: Performed by: SPECIALIST

## 2020-07-30 PROCEDURE — 3600007513: Performed by: SPECIALIST

## 2020-07-30 PROCEDURE — 1200000000 HC SEMI PRIVATE

## 2020-07-30 PROCEDURE — 6370000000 HC RX 637 (ALT 250 FOR IP): Performed by: ANESTHESIOLOGY

## 2020-07-30 PROCEDURE — 74330 X-RAY BILE/PANC ENDOSCOPY: CPT

## 2020-07-30 PROCEDURE — 2500000003 HC RX 250 WO HCPCS: Performed by: HOSPITALIST

## 2020-07-30 PROCEDURE — 3600007503: Performed by: SPECIALIST

## 2020-07-30 RX ORDER — IPRATROPIUM BROMIDE AND ALBUTEROL SULFATE 2.5; .5 MG/3ML; MG/3ML
1 SOLUTION RESPIRATORY (INHALATION) ONCE
Status: COMPLETED | OUTPATIENT
Start: 2020-07-30 | End: 2020-07-30

## 2020-07-30 RX ORDER — PROMETHAZINE HYDROCHLORIDE 25 MG/ML
25 INJECTION, SOLUTION INTRAMUSCULAR; INTRAVENOUS PRN
Status: DISCONTINUED | OUTPATIENT
Start: 2020-07-30 | End: 2020-07-30 | Stop reason: HOSPADM

## 2020-07-30 RX ORDER — MEPERIDINE HYDROCHLORIDE 25 MG/ML
12.5 INJECTION INTRAMUSCULAR; INTRAVENOUS; SUBCUTANEOUS EVERY 5 MIN PRN
Status: DISCONTINUED | OUTPATIENT
Start: 2020-07-30 | End: 2020-07-30 | Stop reason: HOSPADM

## 2020-07-30 RX ORDER — FENTANYL CITRATE 50 UG/ML
INJECTION, SOLUTION INTRAMUSCULAR; INTRAVENOUS PRN
Status: DISCONTINUED | OUTPATIENT
Start: 2020-07-30 | End: 2020-07-30 | Stop reason: SDUPTHER

## 2020-07-30 RX ORDER — PROPOFOL 10 MG/ML
INJECTION, EMULSION INTRAVENOUS PRN
Status: DISCONTINUED | OUTPATIENT
Start: 2020-07-30 | End: 2020-07-30 | Stop reason: SDUPTHER

## 2020-07-30 RX ORDER — MIDAZOLAM HYDROCHLORIDE 1 MG/ML
INJECTION INTRAMUSCULAR; INTRAVENOUS PRN
Status: DISCONTINUED | OUTPATIENT
Start: 2020-07-30 | End: 2020-07-30 | Stop reason: SDUPTHER

## 2020-07-30 RX ORDER — LABETALOL HYDROCHLORIDE 5 MG/ML
5 INJECTION, SOLUTION INTRAVENOUS EVERY 10 MIN PRN
Status: DISCONTINUED | OUTPATIENT
Start: 2020-07-30 | End: 2020-07-30 | Stop reason: HOSPADM

## 2020-07-30 RX ORDER — GLYCOPYRROLATE 1 MG/5 ML
SYRINGE (ML) INTRAVENOUS PRN
Status: DISCONTINUED | OUTPATIENT
Start: 2020-07-30 | End: 2020-07-30 | Stop reason: SDUPTHER

## 2020-07-30 RX ORDER — NEOSTIGMINE METHYLSULFATE 1 MG/ML
INJECTION, SOLUTION INTRAVENOUS PRN
Status: DISCONTINUED | OUTPATIENT
Start: 2020-07-30 | End: 2020-07-30 | Stop reason: SDUPTHER

## 2020-07-30 RX ORDER — FENTANYL CITRATE 50 UG/ML
50 INJECTION, SOLUTION INTRAMUSCULAR; INTRAVENOUS EVERY 5 MIN PRN
Status: DISCONTINUED | OUTPATIENT
Start: 2020-07-30 | End: 2020-07-30 | Stop reason: HOSPADM

## 2020-07-30 RX ORDER — LIDOCAINE HYDROCHLORIDE 20 MG/ML
INJECTION, SOLUTION INTRAVENOUS PRN
Status: DISCONTINUED | OUTPATIENT
Start: 2020-07-30 | End: 2020-07-30 | Stop reason: SDUPTHER

## 2020-07-30 RX ORDER — ROCURONIUM BROMIDE 10 MG/ML
INJECTION, SOLUTION INTRAVENOUS PRN
Status: DISCONTINUED | OUTPATIENT
Start: 2020-07-30 | End: 2020-07-30 | Stop reason: SDUPTHER

## 2020-07-30 RX ORDER — SODIUM CHLORIDE, SODIUM LACTATE, POTASSIUM CHLORIDE, CALCIUM CHLORIDE 600; 310; 30; 20 MG/100ML; MG/100ML; MG/100ML; MG/100ML
INJECTION, SOLUTION INTRAVENOUS CONTINUOUS PRN
Status: DISCONTINUED | OUTPATIENT
Start: 2020-07-30 | End: 2020-07-30 | Stop reason: SDUPTHER

## 2020-07-30 RX ORDER — FENTANYL CITRATE 50 UG/ML
25 INJECTION, SOLUTION INTRAMUSCULAR; INTRAVENOUS EVERY 5 MIN PRN
Status: DISCONTINUED | OUTPATIENT
Start: 2020-07-30 | End: 2020-07-30 | Stop reason: HOSPADM

## 2020-07-30 RX ORDER — DEXTROSE, SODIUM CHLORIDE, AND POTASSIUM CHLORIDE 5; .45; .15 G/100ML; G/100ML; G/100ML
INJECTION INTRAVENOUS CONTINUOUS
Status: DISCONTINUED | OUTPATIENT
Start: 2020-07-30 | End: 2020-07-30

## 2020-07-30 RX ADMIN — METOPROLOL SUCCINATE 25 MG: 25 TABLET, EXTENDED RELEASE ORAL at 21:24

## 2020-07-30 RX ADMIN — Medication 10 ML: at 22:57

## 2020-07-30 RX ADMIN — INSULIN GLARGINE 30 UNITS: 100 INJECTION, SOLUTION SUBCUTANEOUS at 22:37

## 2020-07-30 RX ADMIN — PROPOFOL 170 MG: 10 INJECTION, EMULSION INTRAVENOUS at 13:57

## 2020-07-30 RX ADMIN — OMEGA-3-ACID ETHYL ESTERS 2 G: 1 CAPSULE, LIQUID FILLED ORAL at 21:23

## 2020-07-30 RX ADMIN — TIZANIDINE 4 MG: 4 TABLET ORAL at 21:24

## 2020-07-30 RX ADMIN — INSULIN LISPRO 1 UNITS: 100 INJECTION, SOLUTION INTRAVENOUS; SUBCUTANEOUS at 22:36

## 2020-07-30 RX ADMIN — SODIUM CHLORIDE, POTASSIUM CHLORIDE, SODIUM LACTATE AND CALCIUM CHLORIDE: 600; 310; 30; 20 INJECTION, SOLUTION INTRAVENOUS at 13:56

## 2020-07-30 RX ADMIN — PIPERACILLIN AND TAZOBACTAM 3.38 G: 3; .375 INJECTION, POWDER, FOR SOLUTION INTRAVENOUS at 14:08

## 2020-07-30 RX ADMIN — Medication 0.6 MG: at 14:36

## 2020-07-30 RX ADMIN — FENTANYL CITRATE 50 MCG: 50 INJECTION, SOLUTION INTRAMUSCULAR; INTRAVENOUS at 14:07

## 2020-07-30 RX ADMIN — PIPERACILLIN AND TAZOBACTAM 3.38 G: 3; .375 INJECTION, POWDER, FOR SOLUTION INTRAVENOUS at 01:19

## 2020-07-30 RX ADMIN — ASPIRIN 81 MG CHEWABLE TABLET 81 MG: 81 TABLET CHEWABLE at 21:23

## 2020-07-30 RX ADMIN — ENOXAPARIN SODIUM 40 MG: 40 INJECTION SUBCUTANEOUS at 21:22

## 2020-07-30 RX ADMIN — POTASSIUM CHLORIDE, DEXTROSE MONOHYDRATE AND SODIUM CHLORIDE: 150; 5; 450 INJECTION, SOLUTION INTRAVENOUS at 09:43

## 2020-07-30 RX ADMIN — INSULIN LISPRO 2 UNITS: 100 INJECTION, SOLUTION INTRAVENOUS; SUBCUTANEOUS at 17:07

## 2020-07-30 RX ADMIN — PANTOPRAZOLE SODIUM 40 MG: 40 TABLET, DELAYED RELEASE ORAL at 21:22

## 2020-07-30 RX ADMIN — PIPERACILLIN AND TAZOBACTAM 3.38 G: 3; .375 INJECTION, POWDER, FOR SOLUTION INTRAVENOUS at 09:37

## 2020-07-30 RX ADMIN — Medication 3 MG: at 14:36

## 2020-07-30 RX ADMIN — FENTANYL CITRATE 50 MCG: 50 INJECTION, SOLUTION INTRAMUSCULAR; INTRAVENOUS at 13:57

## 2020-07-30 RX ADMIN — SODIUM CHLORIDE: 9 INJECTION, SOLUTION INTRAVENOUS at 05:55

## 2020-07-30 RX ADMIN — SODIUM CHLORIDE, PRESERVATIVE FREE 10 ML: 5 INJECTION INTRAVENOUS at 16:36

## 2020-07-30 RX ADMIN — ROCURONIUM BROMIDE 20 MG: 10 SOLUTION INTRAVENOUS at 13:57

## 2020-07-30 RX ADMIN — MIDAZOLAM 2 MG: 1 INJECTION INTRAMUSCULAR; INTRAVENOUS at 13:53

## 2020-07-30 RX ADMIN — URSODIOL 300 MG: 300 CAPSULE ORAL at 21:22

## 2020-07-30 RX ADMIN — GLUCAGON HYDROCHLORIDE 1 MG: KIT at 14:35

## 2020-07-30 RX ADMIN — ONDANSETRON HYDROCHLORIDE 4 MG: 2 SOLUTION INTRAMUSCULAR; INTRAVENOUS at 16:35

## 2020-07-30 RX ADMIN — IPRATROPIUM BROMIDE AND ALBUTEROL SULFATE 1 AMPULE: .5; 3 SOLUTION RESPIRATORY (INHALATION) at 16:05

## 2020-07-30 RX ADMIN — LIDOCAINE HYDROCHLORIDE 100 MG: 20 INJECTION, SOLUTION INTRAVENOUS at 13:57

## 2020-07-30 RX ADMIN — DIPHENHYDRAMINE HYDROCHLORIDE 50 MG: 25 TABLET ORAL at 21:23

## 2020-07-30 ASSESSMENT — PULMONARY FUNCTION TESTS
PIF_VALUE: 32
PIF_VALUE: 31
PIF_VALUE: 29
PIF_VALUE: 36
PIF_VALUE: 31
PIF_VALUE: 0
PIF_VALUE: 31
PIF_VALUE: 31
PIF_VALUE: 1
PIF_VALUE: 34
PIF_VALUE: 29
PIF_VALUE: 22
PIF_VALUE: 32
PIF_VALUE: 19
PIF_VALUE: 24
PIF_VALUE: 31
PIF_VALUE: 1
PIF_VALUE: 17
PIF_VALUE: 25
PIF_VALUE: 31
PIF_VALUE: 1
PIF_VALUE: 4
PIF_VALUE: 7
PIF_VALUE: 30
PIF_VALUE: 17
PIF_VALUE: 31
PIF_VALUE: 2
PIF_VALUE: 23
PIF_VALUE: 31
PIF_VALUE: 31
PIF_VALUE: 1
PIF_VALUE: 31
PIF_VALUE: 28
PIF_VALUE: 2
PIF_VALUE: 2
PIF_VALUE: 17
PIF_VALUE: 8
PIF_VALUE: 16
PIF_VALUE: 1
PIF_VALUE: 23
PIF_VALUE: 1
PIF_VALUE: 26
PIF_VALUE: 31
PIF_VALUE: 18
PIF_VALUE: 0
PIF_VALUE: 31
PIF_VALUE: 31
PIF_VALUE: 2
PIF_VALUE: 0
PIF_VALUE: 32
PIF_VALUE: 32
PIF_VALUE: 31
PIF_VALUE: 28
PIF_VALUE: 1
PIF_VALUE: 32
PIF_VALUE: 35

## 2020-07-30 ASSESSMENT — PAIN SCALES - GENERAL
PAINLEVEL_OUTOF10: 0

## 2020-07-30 NOTE — PROCEDURES
Procedure:  Endoscopic Retrograde Cholangiopancreatography     Indication:  Symptomatic choledocholithiasis     Sedation  General Sedation     Side Viewing Endoscope was advanced easily through mouth to second portion of duodenum. Esophagus and Gastric views were limited. Scope was positioned with the ampulla within in a periampullary diverticulum. The scope was placed in view and the wheels were locked. Sphincterotome was advanced and the common bile duct was cannulated. The wire was advanced and locked into position. A cholangiogram was taken showing the bile duct to be ~12-15mm with one ~10mm stone observed in the distal common bile duct. A Sphincteroplasty with a 10mm hurricane was then performed with no complications. The hurricane  sphincteroplasty was removed over wire and a 12-15mm balloon was advanced into the bile duct. The balloon was inserted just proximal to the jevon hepatis. No stones were seen above this. A pull through produced one small cholesterol stone and with some sludge. A second pull through was performed in similar fashion revealed sludge. A third pull through performed and no further sludge was observed. The balloon was reinserted a pressure cholangiogram was taken with dye injected and slowing pulling out the balloon. Bile duct appeared cleared with no residual stones. Good bile drainage was noted. Air was suctioned from the duodenum and stomach and scope was removed. Pt tolerated procedure well with no complications.     The pancreatic was not cannulated or injected with dye during the procedure.       EBL: 3cc                            IMPRESSION AND PLAN:      1. Choledocholithiasis ~1cm cholesterol stone removed with balloon sweeps     2. Sphincteroplasty with hurricane 10mm    3. Duodenal diverticulum, periampullary    4. Ok to resume regular diet when alert.     5.   Will recheck CBC, CMP in am      Pt was seen and procedure was performed with Dr. J Carlos Powell

## 2020-07-30 NOTE — PROGRESS NOTES
Subjective:  Bogdan Crowe was seen and examined at bedside today. The patient's questions were answered and tests were reviewed. There were no new problems reported overnight. Patient is tolerating current diet. Underwent ercp earlier today - results noted  1/4 blood cx returned pos today     A complete review of systems and social history was completed on admission and remains unchanged unless otherwise noted    Scheduled Meds:   metoprolol succinate  25 mg Oral BID    FLUoxetine  20 mg Oral Daily    aspirin  81 mg Oral Nightly    pantoprazole  40 mg Oral BID    tiZANidine  4 mg Oral Nightly    ursodiol  300 mg Oral BID    clopidogrel  75 mg Oral Daily    sodium chloride flush  10 mL Intravenous 2 times per day    enoxaparin  40 mg Subcutaneous Daily    insulin lispro  0-12 Units Subcutaneous TID WC    insulin lispro  0-6 Units Subcutaneous Nightly    omega-3 acid ethyl esters  2 g Oral BID    insulin glargine  30 Units Subcutaneous Nightly    diphenhydrAMINE  50 mg Oral Nightly    piperacillin-tazobactam  3.375 g Intravenous Q8H     Continuous Infusions:   dextrose 5% and 0.45% NaCl with KCl 20 mEq 100 mL/hr at 07/30/20 0943    dextrose      sodium chloride Stopped (07/30/20 0800)     PRN Meds:HYDROmorphone, fluticasone, sodium chloride flush, acetaminophen **OR** acetaminophen, polyethylene glycol, promethazine **OR** ondansetron, glucose, dextrose, glucagon (rDNA), dextrose    Objective:  /89   Pulse 79   Temp 98.5 °F (36.9 °C) (Oral)   Resp 18   Ht 5' 3\" (1.6 m)   Wt 224 lb (101.6 kg)   SpO2 93%   BMI 39.68 kg/m²   In: 697 [P.O.:220; I.V.:427]  Out: 1500    In: 697   Out: 1500 [Urine:1500]     AAO x 3, currently in NAD  RRR, pos S1, S2  CTA bilaterally, no wheeze, rales or rhonchi  bowel sounds present, nontender, nondistended  No clubbing, cyanosis, or edema  No neuro changes   No obvious rashes or lesions.     Recent Labs     07/28/20  0742 07/29/20  0544 07/30/20  0457   WBC 6.3 4.6 5.2   HGB 10.9* 11.1* 11.7    292 291     Recent Labs     07/28/20  0742 07/29/20  0544 07/30/20  0457    139 142   K 3.4* 3.8 3.8    105 105   CO2 25 21* 24   BUN 10 7 6   CREATININE 1.1* 1.0 1.0   GLUCOSE 89 107* 79     Recent Labs     07/28/20  0742 07/29/20  0544 07/30/20  0457   BILITOT 0.5 0.5 0.5   ALKPHOS 177* 236* 223*   AST 50* 65* 35*   ALT 90* 98* 73*     Recent Labs     07/28/20  0742 07/29/20  0544   INR 1.0 1.0     No results for input(s): CKTOTAL, CKMB, CKMBINDEX, TROPONINI in the last 72 hours. Mri Abdomen W Wo Contrast Mrcp    Result Date: 7/27/2020  EXAMINATION: MRI OF THE ABDOMEN WITH AND WITHOUT CONTRAST AND MRCP 7/27/2020 4:22 pm TECHNIQUE: Multiplanar multisequence MRI of the abdomen was performed with and without the administration of intravenous contrast.  After initial T2 axial and coronal images, thick slab, thin slab and 3D coronal MRCP sequences were obtained without the administration of intravenous contrast.  MIP images are provided for review. COMPARISON: Ultrasound 7/26/2020, CT abdomen and pelvis 1/2/2020 HISTORY: ORDERING SYSTEM PROVIDED HISTORY: Elevated LFT\"s and abnormal RUQ US TECHNOLOGIST PROVIDED HISTORY: Reason for exam:->Elevated LFT\"s and abnormal RUQ US FINDINGS: Lung bases are clear. No focal liver lesion. Loss of signal on out of phase imaging is consistent with hepatic steatosis. Gallbladder: Cholecystectomy. Bile Ducts: No intrahepatic ductal dilatation. Common bile duct is within normal limits for prior cholecystectomy measuring up to 8-9 mm. Within the distal common bile duct, there is a 9 mm filling defect. Pancreatic Duct: Pancreatic duct is within normal limits for size. No pancreatic lesion. Other:  No splenomegaly. No adrenal lesion. No hydronephrosis. Left renal cyst.  No free fluid within the abdomen. No evidence of bowel obstruction. No soft tissue abnormality. No suspicious bone marrow replacing lesion.  Postcontrast meantime  · Stop ivf and increase diet  · Otherwise continue current therapy.   · Dc home tomorrow if ok with others    Cloyd Dubin, MD  9:17 PM  7/30/2020

## 2020-07-30 NOTE — ANESTHESIA PRE PROCEDURE
Department of Anesthesiology  Preprocedure Note       Name:  Brandon Acuña   Age:  48 y.o.  :  1970                                          MRN:  [id number]         Date:  2020      Surgeon: Agusto Barron):  Harry Diaz MD    Procedure: ERCP ENDOSCOPIC RETROGRADE CHOLANGIOPANCREATOGRAPHY WITH STONE REMOVAL (N/A )    Medications prior to admission:   Prior to Admission medications    Medication Sig Start Date End Date Taking?  Authorizing Provider   metoprolol tartrate (LOPRESSOR) 50 MG tablet Take 50 mg by mouth 2 times daily    Historical Provider, MD   ursodiol (ACTIGALL) 300 MG capsule Take 300 mg by mouth 2 times daily    Historical Provider, MD   atorvastatin (LIPITOR) 20 MG tablet Take 20 mg by mouth three times a week Takes on Mon, Wed, Fri    Historical Provider, MD   Icosapent Ethyl (VASCEPA) 1 g CAPS capsule Take 2 capsules by mouth 2 times daily    Historical Provider, MD   clopidogrel (PLAVIX) 75 MG tablet Take 1 tablet by mouth daily Take 4 by mouth first day then 1 by mouth daily  Patient taking differently: Take 75 mg by mouth daily  20   Bill Altamirano MD   diphenhydrAMINE (BENADRYL) 50 MG capsule Take 50 mg by mouth nightly    Historical Provider, MD   insulin lispro (HUMALOG) 100 UNIT/ML injection vial Inject 8 Units into the skin 3 times daily (with meals)  Patient taking differently: Inject 0-20 Units into the skin 3 times daily (with meals) 150-200 3 units  201-250 6 units  251-300 9 units  301-350 12 units  351-400 15 units 19   Rufino Mojica DO   Insulin Degludec (TRESIBA FLEXTOUCH SC) Inject 62 Units into the skin nightly     Historical Provider, MD   TiZANidine HCl (ZANAFLEX PO) Take 4 mg by mouth nightly     Historical Provider, MD   fluticasone (FLONASE) 50 MCG/ACT nasal spray 2 sprays by Nasal route daily 2 sprays in each nostril daily  Patient taking differently: 2 sprays by Nasal route as needed 2 sprays in each nostril daily 3/23/16   Gifty Gomez DO pantoprazole (PROTONIX) 40 MG tablet Take 40 mg by mouth 2 times daily     Historical Provider, MD   triamterene-hydrochlorothiazide (MAXZIDE) 75-50 MG per tablet Take 1 tablet by mouth daily. Historical Provider, MD   FLUoxetine (PROZAC) 20 MG capsule Take 20 mg by mouth daily     Historical Provider, MD   aspirin 81 MG tablet Take 81 mg by mouth nightly     Historical Provider, MD       Current medications:    No current facility-administered medications for this visit. No current outpatient medications on file.      Facility-Administered Medications Ordered in Other Visits   Medication Dose Route Frequency Provider Last Rate Last Dose    dextrose 5 % and 0.45 % NaCl with KCl 20 mEq infusion   Intravenous Continuous Go Antoine  mL/hr at 07/30/20 5195      metoprolol succinate (TOPROL XL) extended release tablet 25 mg  25 mg Oral BID Suellen Morgan MD   25 mg at 07/29/20 2035    HYDROmorphone HCl PF (DILAUDID) injection 0.5 mg  0.5 mg Intravenous Q8H PRN Suellen Morgan MD        FLUoxetine (PROZAC) capsule 20 mg  20 mg Oral Daily Suellen Morgan MD   20 mg at 07/29/20 0902    aspirin chewable tablet 81 mg  81 mg Oral Nightly Suellen Morgan MD   81 mg at 07/29/20 2035    pantoprazole (PROTONIX) tablet 40 mg  40 mg Oral BID Suellen Morgan MD   40 mg at 07/29/20 2035    tiZANidine (ZANAFLEX) tablet 4 mg  4 mg Oral Nightly Suellen Morgan MD   4 mg at 07/29/20 2035    ursodiol (ACTIGALL) capsule 300 mg  300 mg Oral BID Suellen Morgan MD   300 mg at 07/29/20 2035    clopidogrel (PLAVIX) tablet 75 mg  75 mg Oral Daily Suellen Morgan MD   Stopped at 07/29/20 0931    fluticasone (FLONASE) 50 MCG/ACT nasal spray 2 spray  2 spray Nasal PRN Suellen Morgan MD        sodium chloride flush 0.9 % injection 10 mL  10 mL Intravenous 2 times per day Suellen Morgan MD   10 mL at 07/27/20 2217    sodium chloride flush 0.9 % injection 10 mL  10 mL Intravenous PRN Suellen Morgan MD  acetaminophen (TYLENOL) tablet 650 mg  650 mg Oral Q6H PRN Paige Piedra MD        Or    acetaminophen (TYLENOL) suppository 650 mg  650 mg Rectal Q6H PRN Paige Piedra MD        polyethylene glycol [facility]) packet 17 g  17 g Oral Daily PRN Paige Piedra MD        promethazine (PHENERGAN) tablet 12.5 mg  12.5 mg Oral Q6H PRN Paige Piedra MD   12.5 mg at 07/28/20 1337    Or    ondansetron (ZOFRAN) injection 4 mg  4 mg Intravenous Q6H PRN Paige Piedra MD        enoxaparin (LOVENOX) injection 40 mg  40 mg Subcutaneous Daily Paige Piedra MD   40 mg at 07/29/20 1707    insulin lispro (HUMALOG) injection vial 0-12 Units  0-12 Units Subcutaneous TID WC Paige Piedra MD   Stopped at 07/27/20 0743    insulin lispro (HUMALOG) injection vial 0-6 Units  0-6 Units Subcutaneous Nightly Paige Piedra MD   3 Units at 07/28/20 2150    glucose (GLUTOSE) 40 % oral gel 15 g  15 g Oral PRN Paige Piedra MD        dextrose 50 % IV solution  12.5 g Intravenous PRN Paige Piedra MD        glucagon (rDNA) injection 1 mg  1 mg Intramuscular PRN Paige Piedra MD        dextrose 5 % solution  100 mL/hr Intravenous PRN Paige Piedra MD        omega-3 acid ethyl esters (LOVAZA) capsule 2 g  2 g Oral BID Paige Piedra MD   2 g at 07/29/20 2113    insulin glargine (LANTUS) injection vial 30 Units  30 Units Subcutaneous Nightly Paige Piedra MD   30 Units at 07/29/20 2135    diphenhydrAMINE (BENADRYL) tablet 50 mg  50 mg Oral Nightly Paige Piedra MD   50 mg at 07/29/20 2035    piperacillin-tazobactam (ZOSYN) 3.375 g in dextrose 5 % 50 mL IVPB extended infusion (mini-bag)  3.375 g Intravenous Q8H Paige Piedra MD 12.5 mL/hr at 07/30/20 0937 3.375 g at 07/30/20 1644    And    0.9 % sodium chloride infusion   Intravenous Q8H Paige Piedra MD   Stopped at 07/30/20 0800       Allergies: Allergies   Allergen Reactions    Latex Rash    Morphine     Other      ENVIRONMENTAL.     Statins Other (See Comments)     Liver enlargement    Adhesive Tape Rash    Sulfa Antibiotics Rash       Problem List:    Patient Active Problem List   Diagnosis Code    Elevated LFTs R94.5    Abdominal pain R10.9    Hyperlipidemia E78.5    Nausea and vomiting in adult R11.2    Allergic rhinitis due to allergen J30.9    MTHFR mutation (HCC) E72.12    Pneumobilia K83.8    Morbid obesity ([facility] E66.01    Obstructive jaundice K83.1    Hepatic artery stenosis (HCC) I77.1    Diabetes mellitus ([facility] E11.9    Chest pain R07.9    NSTEMI (non-ST elevated myocardial infarction) ([facility] I21.4    CAD in native artery I25.10    Choledocholithiasis K80.50       Past Medical History:        Diagnosis Date    Back pain     CAD in native artery 3/16/2020    Depression with anxiety     Diabetes mellitus ([facility]     Fatty liver     Gallstones     GERD (gastroesophageal reflux disease)     Hiatal hernia     Hyperlipidemia     Hypertension     Metabolic disorder     MTHFR (methylene THF reductase) deficiency and homocystinuria (HCC)     MTHFR mutation ([facility]     Nasal septal deviation     procedure 10/27/2014    NSTEMI (non-ST elevated myocardial infarction) ([facility]     Preoperative clearance 10/20/2014    medical-Dr. Charles Hernandez; paper copy on chart for procedure 10/27/2014    Prolonged emergence from general anesthesia     Sleep apnea     cpap    Unspecified diseases of blood and blood-forming organs        Past Surgical History:        Procedure Laterality Date    ABDOMEN SURGERY      CARDIAC SURGERY  03/16/2020    cardiac catheterization/stent    CHOLECYSTECTOMY      ECTOPIC PREGNANCY SURGERY      x  2    ERCP      ERCP N/A 1/4/2020    ERCP SPHINCTER/PAPILLOTOMY performed by Enrico Barnes MD at [address] [place] [address] ERCP  1/4/2020    ERCP DILATION BALLOON performed by Enrico Barnes MD at [address] [place] [address] ERCP  1/4/2020    ERCP STONE REMOVAL performed by Enrico Barnes MD at [address] [place] [address] ERCP  1/4/2020    ERCP STENT INSERTION performed by Zayra Contreras MD at [address] [facility] ERCP N/A 2/24/2020    ERCP STENT REMOVAL performed by Zayra Contreras MD at [address] [facility] [address] [place] Bilateral 10/27/2014    BALLOON SINSPLASTY SEPTOPLASTY SUBMUCUSAL RESECTION OF INFERIRO TURBINATES       Social History:    Social History     Tobacco Use    Smoking status: Never Smoker    Smokeless tobacco: Never Used   Substance Use Topics    Alcohol use: Not Currently     Alcohol/week: 0.0 standard drinks     Comment: 2 sweet tea daily                                Counseling given: Not Answered      Vital Signs (Current): There were no vitals filed for this visit. BP Readings from Last 3 Encounters:   07/30/20 98/69   07/14/20 118/74   07/10/20 (!) 143/89       NPO Status:                                                                                 BMI:   Wt Readings from Last 3 Encounters:   07/26/20 224 lb (101.6 kg)   07/14/20 224 lb (101.6 kg)   07/10/20 224 lb (101.6 kg)     There is no height or weight on file to calculate BMI.    CBC:   Lab Results   Component Value Date    WBC 5.2 07/30/2020    RBC 4.12 07/30/2020    HGB 11.7 07/30/2020    HCT 35.4 07/30/2020    MCV 85.9 07/30/2020    RDW 13.2 07/30/2020     07/30/2020       CMP:   Lab Results   Component Value Date     07/30/2020    K 3.8 07/30/2020    K 3.7 07/27/2020     07/30/2020    CO2 24 07/30/2020    BUN 6 07/30/2020    CREATININE 1.0 07/30/2020    GFRAA >60 07/30/2020    LABGLOM 59 07/30/2020    GLUCOSE 79 07/30/2020    GLUCOSE 150 02/23/2012    PROT 6.8 07/30/2020    CALCIUM 9.0 07/30/2020    BILITOT 0.5 07/30/2020    ALKPHOS 223 07/30/2020    AST 35 07/30/2020    ALT 73 07/30/2020       POC Tests:   No results for input(s): POCGLU, POCNA, POCK, POCCL, POCBUN, POCHEMO, POCHCT in the last 72 hours.     Coags:   Lab Results   Component Value Date    PROTIME 11.7 07/29/2020    INR 1.0 07/29/2020 APTT 30.7 02/24/2020       HCG (If Applicable):   Lab Results   Component Value Date    PREGTESTUR NEGATIVE 01/03/2020        ABGs: No results found for: PHART, PO2ART, UKO8TGP, PEU2SPF, BEART, [id number]     Type & Screen (If Applicable):  No results found for: LABABO, [address]    Anesthesia Evaluation  Patient summary reviewed and Nursing notes reviewed history of anesthetic complications (Prolonged emergence from general anesthesia  ):   Airway: Mallampati: II  TM distance: >3 FB   Neck ROM: full  Mouth opening: > = 3 FB Dental: normal exam         Pulmonary: breath sounds clear to auscultation  (+) sleep apnea: on CPAP,                             Cardiovascular:  Exercise tolerance: good (>4 METS),   (+) hypertension: moderate, past MI:, CAD:, hyperlipidemia      ECG reviewed  Rhythm: regular  Rate: normal                    Neuro/Psych:   (+) psychiatric history:depression/anxiety             GI/Hepatic/Renal:   (+) hiatal hernia, GERD:, liver disease (Fatty Liver):, morbid obesity         ROS comment: Obstructive jaundice  Past ERCP. Endo/Other:    (+) DiabetesType II DM, using insulin, blood dyscrasia::., .    Hypothyroidism: MTHFR Mutation (Thromboembolic Disease)               Abdominal:   (+) obese,         Vascular: negative vascular ROS. Anesthesia Plan      general     ASA 3       Induction: intravenous. MIPS: Postoperative opioids intended and Prophylactic antiemetics administered. Anesthetic plan and risks discussed with patient. Plan discussed with CRNA.                   Skylar Vee MD   7/30/2020

## 2020-07-30 NOTE — PLAN OF CARE
Problem: Pain:  Goal: Pain level will decrease  Description: Pain level will decrease  Outcome: Met This Shift     Problem: Pain:  Goal: Control of acute pain  Description: Control of acute pain  Outcome: Met This Shift     Problem: Nausea/Vomiting:  Goal: Absence of nausea/vomiting  Description: Absence of nausea/vomiting  Outcome: Met This Shift     Problem: Nausea/Vomiting:  Goal: Able to drink  Description: Able to drink  Outcome: Met This Shift     Problem: Nausea/Vomiting:  Goal: Able to eat  Description: Able to eat  Outcome: Met This Shift     Problem: Nausea/Vomiting:  Goal: Ability to achieve adequate nutritional intake will improve  Description: Ability to achieve adequate nutritional intake will improve  Outcome: Met This Shift

## 2020-07-31 LAB
ALBUMIN SERPL-MCNC: 3 G/DL (ref 3.5–5.2)
ALP BLD-CCNC: 186 U/L (ref 35–104)
ALT SERPL-CCNC: 46 U/L (ref 0–32)
ANION GAP SERPL CALCULATED.3IONS-SCNC: 14 MMOL/L (ref 7–16)
AST SERPL-CCNC: 19 U/L (ref 0–31)
BILIRUB SERPL-MCNC: 0.3 MG/DL (ref 0–1.2)
BLOOD CULTURE, ROUTINE: NORMAL
BUN BLDV-MCNC: 7 MG/DL (ref 6–20)
CALCIUM SERPL-MCNC: 8.7 MG/DL (ref 8.6–10.2)
CHLORIDE BLD-SCNC: 103 MMOL/L (ref 98–107)
CO2: 23 MMOL/L (ref 22–29)
CREAT SERPL-MCNC: 0.9 MG/DL (ref 0.5–1)
GFR AFRICAN AMERICAN: >60
GFR NON-AFRICAN AMERICAN: >60 ML/MIN/1.73
GLUCOSE BLD-MCNC: 256 MG/DL (ref 74–99)
HCT VFR BLD CALC: 33.5 % (ref 34–48)
HEMOGLOBIN: 11.1 G/DL (ref 11.5–15.5)
MCH RBC QN AUTO: 28.5 PG (ref 26–35)
MCHC RBC AUTO-ENTMCNC: 33.1 % (ref 32–34.5)
MCV RBC AUTO: 85.9 FL (ref 80–99.9)
METER GLUCOSE: 132 MG/DL (ref 74–99)
METER GLUCOSE: 212 MG/DL (ref 74–99)
METER GLUCOSE: 229 MG/DL (ref 74–99)
METER GLUCOSE: 240 MG/DL (ref 74–99)
PDW BLD-RTO: 13.1 FL (ref 11.5–15)
PLATELET # BLD: 295 E9/L (ref 130–450)
PMV BLD AUTO: 9.7 FL (ref 7–12)
POTASSIUM SERPL-SCNC: 3.9 MMOL/L (ref 3.5–5)
RBC # BLD: 3.9 E12/L (ref 3.5–5.5)
SODIUM BLD-SCNC: 140 MMOL/L (ref 132–146)
TOTAL PROTEIN: 6.2 G/DL (ref 6.4–8.3)
WBC # BLD: 5.9 E9/L (ref 4.5–11.5)

## 2020-07-31 PROCEDURE — 6360000002 HC RX W HCPCS: Performed by: INTERNAL MEDICINE

## 2020-07-31 PROCEDURE — 1200000000 HC SEMI PRIVATE

## 2020-07-31 PROCEDURE — 36415 COLL VENOUS BLD VENIPUNCTURE: CPT

## 2020-07-31 PROCEDURE — 80053 COMPREHEN METABOLIC PANEL: CPT

## 2020-07-31 PROCEDURE — 2580000003 HC RX 258: Performed by: INTERNAL MEDICINE

## 2020-07-31 PROCEDURE — 82962 GLUCOSE BLOOD TEST: CPT

## 2020-07-31 PROCEDURE — 6370000000 HC RX 637 (ALT 250 FOR IP): Performed by: INTERNAL MEDICINE

## 2020-07-31 PROCEDURE — 85027 COMPLETE CBC AUTOMATED: CPT

## 2020-07-31 RX ADMIN — CLOPIDOGREL BISULFATE 75 MG: 75 TABLET ORAL at 09:03

## 2020-07-31 RX ADMIN — TIZANIDINE 4 MG: 4 TABLET ORAL at 21:22

## 2020-07-31 RX ADMIN — URSODIOL 300 MG: 300 CAPSULE ORAL at 21:21

## 2020-07-31 RX ADMIN — INSULIN GLARGINE 30 UNITS: 100 INJECTION, SOLUTION SUBCUTANEOUS at 21:26

## 2020-07-31 RX ADMIN — SODIUM CHLORIDE: 9 INJECTION, SOLUTION INTRAVENOUS at 22:02

## 2020-07-31 RX ADMIN — ENOXAPARIN SODIUM 40 MG: 40 INJECTION SUBCUTANEOUS at 21:22

## 2020-07-31 RX ADMIN — ASPIRIN 81 MG CHEWABLE TABLET 81 MG: 81 TABLET CHEWABLE at 21:22

## 2020-07-31 RX ADMIN — PIPERACILLIN AND TAZOBACTAM 3.38 G: 3; .375 INJECTION, POWDER, FOR SOLUTION INTRAVENOUS at 09:02

## 2020-07-31 RX ADMIN — OMEGA-3-ACID ETHYL ESTERS 2 G: 1 CAPSULE, LIQUID FILLED ORAL at 21:22

## 2020-07-31 RX ADMIN — PIPERACILLIN AND TAZOBACTAM 3.38 G: 3; .375 INJECTION, POWDER, FOR SOLUTION INTRAVENOUS at 18:01

## 2020-07-31 RX ADMIN — PANTOPRAZOLE SODIUM 40 MG: 40 TABLET, DELAYED RELEASE ORAL at 09:03

## 2020-07-31 RX ADMIN — PANTOPRAZOLE SODIUM 40 MG: 40 TABLET, DELAYED RELEASE ORAL at 21:22

## 2020-07-31 RX ADMIN — METOPROLOL SUCCINATE 25 MG: 25 TABLET, EXTENDED RELEASE ORAL at 21:22

## 2020-07-31 RX ADMIN — INSULIN LISPRO 2 UNITS: 100 INJECTION, SOLUTION INTRAVENOUS; SUBCUTANEOUS at 21:26

## 2020-07-31 RX ADMIN — PIPERACILLIN AND TAZOBACTAM 3.38 G: 3; .375 INJECTION, POWDER, FOR SOLUTION INTRAVENOUS at 00:26

## 2020-07-31 RX ADMIN — SODIUM CHLORIDE: 9 INJECTION, SOLUTION INTRAVENOUS at 05:07

## 2020-07-31 RX ADMIN — FLUOXETINE 20 MG: 20 CAPSULE ORAL at 09:03

## 2020-07-31 RX ADMIN — SODIUM CHLORIDE: 9 INJECTION, SOLUTION INTRAVENOUS at 13:05

## 2020-07-31 RX ADMIN — Medication 10 ML: at 09:03

## 2020-07-31 RX ADMIN — URSODIOL 300 MG: 300 CAPSULE ORAL at 09:02

## 2020-07-31 RX ADMIN — INSULIN LISPRO 4 UNITS: 100 INJECTION, SOLUTION INTRAVENOUS; SUBCUTANEOUS at 17:18

## 2020-07-31 RX ADMIN — INSULIN LISPRO 4 UNITS: 100 INJECTION, SOLUTION INTRAVENOUS; SUBCUTANEOUS at 09:08

## 2020-07-31 RX ADMIN — OMEGA-3-ACID ETHYL ESTERS 2 G: 1 CAPSULE, LIQUID FILLED ORAL at 09:03

## 2020-07-31 RX ADMIN — DIPHENHYDRAMINE HYDROCHLORIDE 50 MG: 25 TABLET ORAL at 21:22

## 2020-07-31 NOTE — PLAN OF CARE
Problem: Pain:  Goal: Pain level will decrease  Description: Pain level will decrease  Outcome: Met This Shift  Goal: Control of acute pain  Description: Control of acute pain  Outcome: Met This Shift     Problem: Nausea/Vomiting:  Goal: Absence of nausea/vomiting  Description: Absence of nausea/vomiting  Outcome: Met This Shift  Goal: Able to drink  Description: Able to drink  Outcome: Met This Shift  Goal: Able to eat  Description: Able to eat  Outcome: Met This Shift  Goal: Ability to achieve adequate nutritional intake will improve  Description: Ability to achieve adequate nutritional intake will improve  Outcome: Met This Shift     Problem: Falls - Risk of:  Goal: Will remain free from falls  Description: Will remain free from falls  Outcome: Met This Shift  Goal: Absence of physical injury  Description: Absence of physical injury  Outcome: Met This Shift
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
## 2020-07-31 NOTE — PLAN OF CARE
Problem: Pain:  Goal: Pain level will decrease  Description: Pain level will decrease  7/31/2020 1015 by Donya Donald RN  Outcome: Met This Shift     Problem: Pain:  Goal: Control of acute pain  Description: Control of acute pain  7/31/2020 1015 by Donya Donald RN  Outcome: Met This Shift     Problem: Nausea/Vomiting:  Goal: Absence of nausea/vomiting  Description: Absence of nausea/vomiting  7/31/2020 1015 by Donya Donald RN  Outcome: Met This Shift     Problem: Nausea/Vomiting:  Goal: Able to drink  Description: Able to drink  7/31/2020 1015 by Donya Donald RN  Outcome: Met This Shift     Problem: Nausea/Vomiting:  Goal: Able to eat  Description: Able to eat  7/31/2020 1015 by Donya Donald RN  Outcome: Met This Shift     Problem: Nausea/Vomiting:  Goal: Ability to achieve adequate nutritional intake will improve  Description: Ability to achieve adequate nutritional intake will improve  7/31/2020 1015 by Donya Donald RN  Outcome: Met This Shift     Problem: Falls - Risk of:  Goal: Will remain free from falls  Description: Will remain free from falls  7/31/2020 1015 by Donya Donald RN  Outcome: Met This Shift     Problem: Falls - Risk of:  Goal: Absence of physical injury  Description: Absence of physical injury  7/31/2020 1015 by Donya Donald RN  Outcome: Met This Shift

## 2020-07-31 NOTE — PROGRESS NOTES
Subjective:  Roro Armstrong was seen and examined at bedside today. The patient's questions were answered and tests were reviewed. There were no new problems reported overnight. Patient is tolerating current diet with no recurrence of n/v/abd pain post ercp  No new complaints    A complete review of systems and social history was completed on admission and remains unchanged unless otherwise noted    Scheduled Meds:   metoprolol succinate  25 mg Oral BID    FLUoxetine  20 mg Oral Daily    aspirin  81 mg Oral Nightly    pantoprazole  40 mg Oral BID    tiZANidine  4 mg Oral Nightly    ursodiol  300 mg Oral BID    clopidogrel  75 mg Oral Daily    sodium chloride flush  10 mL Intravenous 2 times per day    enoxaparin  40 mg Subcutaneous Daily    insulin lispro  0-12 Units Subcutaneous TID WC    insulin lispro  0-6 Units Subcutaneous Nightly    omega-3 acid ethyl esters  2 g Oral BID    insulin glargine  30 Units Subcutaneous Nightly    diphenhydrAMINE  50 mg Oral Nightly    piperacillin-tazobactam  3.375 g Intravenous Q8H     Continuous Infusions:   dextrose      sodium chloride Stopped (07/31/20 1505)     PRN Meds:HYDROmorphone, fluticasone, sodium chloride flush, acetaminophen **OR** acetaminophen, polyethylene glycol, promethazine **OR** ondansetron, glucose, dextrose, glucagon (rDNA), dextrose    Objective:  BP (!) 96/53   Pulse 63   Temp 98.5 °F (36.9 °C) (Oral)   Resp 14   Ht 5' 3\" (1.6 m)   Wt 224 lb (101.6 kg)   SpO2 92%   BMI 39.68 kg/m²   In: 480 [P.O.:480]  Out: 900    In: 480   Out: 900 [Urine:900]     AAO x 3, currently in NAD  RRR, pos S1, S2  CTA bilaterally, no wheeze, rales or rhonchi  bowel sounds present, nontender, nondistended  No clubbing, cyanosis, or edema  No neuro changes   No obvious rashes or lesions.     Recent Labs     07/29/20 0544 07/30/20 0457 07/31/20  0510   WBC 4.6 5.2 5.9   HGB 11.1* 11.7 11.1*    291 295     Recent Labs     07/29/20 0544 07/30/20 0457 07/31/20  0510    142 140   K 3.8 3.8 3.9    105 103   CO2 21* 24 23   BUN 7 6 7   CREATININE 1.0 1.0 0.9   GLUCOSE 107* 79 256*     Recent Labs     07/29/20  0544 07/30/20  0457 07/31/20  0510   BILITOT 0.5 0.5 0.3   ALKPHOS 236* 223* 186*   AST 65* 35* 19   ALT 98* 73* 46*     Recent Labs     07/29/20  0544   INR 1.0     No results for input(s): CKTOTAL, CKMB, CKMBINDEX, TROPONINI in the last 72 hours. Mri Abdomen W Wo Contrast Mrcp    Result Date: 7/27/2020  EXAMINATION: MRI OF THE ABDOMEN WITH AND WITHOUT CONTRAST AND MRCP 7/27/2020 4:22 pm TECHNIQUE: Multiplanar multisequence MRI of the abdomen was performed with and without the administration of intravenous contrast.  After initial T2 axial and coronal images, thick slab, thin slab and 3D coronal MRCP sequences were obtained without the administration of intravenous contrast.  MIP images are provided for review. COMPARISON: Ultrasound 7/26/2020, CT abdomen and pelvis 1/2/2020 HISTORY: ORDERING SYSTEM PROVIDED HISTORY: Elevated LFT\"s and abnormal RUQ US TECHNOLOGIST PROVIDED HISTORY: Reason for exam:->Elevated LFT\"s and abnormal RUQ US FINDINGS: Lung bases are clear. No focal liver lesion. Loss of signal on out of phase imaging is consistent with hepatic steatosis. Gallbladder: Cholecystectomy. Bile Ducts: No intrahepatic ductal dilatation. Common bile duct is within normal limits for prior cholecystectomy measuring up to 8-9 mm. Within the distal common bile duct, there is a 9 mm filling defect. Pancreatic Duct: Pancreatic duct is within normal limits for size. No pancreatic lesion. Other:  No splenomegaly. No adrenal lesion. No hydronephrosis. Left renal cyst.  No free fluid within the abdomen. No evidence of bowel obstruction. No soft tissue abnormality. No suspicious bone marrow replacing lesion. Postcontrast imaging demonstrates no abnormal enhancement.      Choledocholithiasis with a 9 mm calculus within the distal common bile duct.     Us Abdomen Limited    Result Date: 7/26/2020  EXAMINATION: RIGHT UPPER QUADRANT ULTRASOUND 7/26/2020 3:26 pm COMPARISON: January 5, 2020 HISTORY: ORDERING SYSTEM PROVIDED HISTORY: RUQ pain, h/o cholecystectomy TECHNOLOGIST PROVIDED HISTORY: Reason for exam:->RUQ pain, h/o cholecystectomy Reason for exam:->h/o choledocho, recurrent stones. r/o dilated ducts or stone. Acute right upper quadrant abdominal pain. Prior cholecystectomy. FINDINGS: LIVER:  Increased echogenicity of the liver. No focal hepatic lesion or evidence of intrahepatic ductal dilatation. BILIARY SYSTEM:  Cholecystectomy. Common bile duct is within normal limits measuring 0.5 cm. There is an approximately 4 mm echogenic focus in the common bile duct with no appreciable shadowing. RIGHT KIDNEY: The right kidney is grossly unremarkable without evidence of hydronephrosis. PANCREAS:  Visualized portions of the pancreas are unremarkable. OTHER: No evidence of right upper quadrant ascites. 1. No convincing finding to account for patient's right upper quadrant abdominal pain. There is a questionable 4 mm echogenic focus in the common bile duct with no associated intra or extrahepatic ductal dilatation. Differential includes ductal debris and retained stone. If there is clinical concern for choledocholithiasis, consider MRCP for further evaluation. 2. Hepatic steatosis. 3. Cholecystectomy. Assessment:  Krissy Riddle is a 48y.o. year old female who presented on 7/26/2020 and is being treated for:  Principal Problem:    Choledocholithiasis  Active Problems:    Elevated LFTs    Abdominal pain    Nausea and vomiting in adult    MTHFR mutation ([address]    Diabetes mellitus ([address]    CAD in native artery  Resolved Problems:    * No resolved hospital problems.  *    Plan  · Awaiting final blood cx results - cont zosyn in the meantime  · Ok for d/c when ok with ID  · Resume home meds as before but advised to pt to monitor bp at home and if still

## 2020-07-31 NOTE — CARE COORDINATION
CM note: covid not tested. Awaiting ID consult for possible bacteremia, antibiotic need. No other identified discharge needs.

## 2020-07-31 NOTE — PROGRESS NOTES
PROGRESS NOTE  By Demetris Avery M.D. [facility]  Dr. Jennifer Luna M.D.,  Dr. Demarco Weinstein M.D.,   EDUARDA HopkinsO.,  Dr. Slick Mathews M.D.,  Dr Chris Arora D.O. 5 [facility]  48 y.o.  female    SUBJECTIVE:  Resolved abdominal pain. Tolerates diet without nausea or vomiting    OBJECTIVE:    BP (!) 96/53   Pulse 63   Temp 98.5 °F (36.9 °C) (Oral)   Resp 14   Ht 5' 3\" (1.6 m)   Wt 224 lb (101.6 kg)   SpO2 92%   BMI 39.68 kg/m²     General: Obese  female/NAD  HEENT: Anicteric sclera/moist oral mucosa  Neck: Supple/trachea midline  Chest: CTA B  Cor: Regular  Abd.: Soft and obese. Nontender  Extr.:  No peripheral edema  Skin: Warm and dry/anicteric      DATA:    Stool (measured) : 0 mL  Lab Results   Component Value Date    WBC 5.9 07/31/2020    RBC 3.90 07/31/2020    HGB 11.1 07/31/2020    HCT 33.5 07/31/2020    MCV 85.9 07/31/2020    MCH 28.5 07/31/2020    MCHC 33.1 07/31/2020    RDW 13.1 07/31/2020     07/31/2020    MPV 9.7 07/31/2020     Lab Results   Component Value Date     07/31/2020    K 3.9 07/31/2020    K 3.7 07/27/2020     07/31/2020    CO2 23 07/31/2020    BUN 7 07/31/2020    CREATININE 0.9 07/31/2020    CALCIUM 8.7 07/31/2020    PROT 6.2 07/31/2020    LABALBU 3.0 07/31/2020    LABALBU 4.0 10/27/2011    BILITOT 0.3 07/31/2020    ALKPHOS 186 07/31/2020    AST 19 07/31/2020    ALT 46 07/31/2020     Lab Results   Component Value Date    LIPASE 43 07/26/2020     Lab Results   Component Value Date    AMYLASE 18 01/18/2019         ASSESSMENT/PLAN:  1. Abdominal pain with elevated LFT's and history of recurrent choledocholithiasis  -S/P ERCP    -Antibiotics per ID     2.  History of PUD  -PPI BID        3. DM/hypertension/hyperlipidemia/CAD s/p PCI in /sleep apnea/ MTHFR mutation  -per primary      Okay to be discharged from GI POV with follow-up in the office in 1 to 2 weeks after discharge -patient to call for appointment and with questions/concerns at  [phone]. Thank you for the opportunity to participate in the care of Mrs. Paige    NOTE:  This report was transcribed using voice recognition software. Every effort was made to ensure accuracy; however, inadvertent computerized transcription errors may be present.     Armen Johnston MD  7/31/2020  11:46 AM with removal of stone

## 2020-08-01 VITALS
SYSTOLIC BLOOD PRESSURE: 114 MMHG | RESPIRATION RATE: 16 BRPM | OXYGEN SATURATION: 97 % | TEMPERATURE: 97.7 F | BODY MASS INDEX: 39.69 KG/M2 | HEIGHT: 63 IN | HEART RATE: 59 BPM | WEIGHT: 224 LBS | DIASTOLIC BLOOD PRESSURE: 69 MMHG

## 2020-08-01 LAB
METER GLUCOSE: 196 MG/DL (ref 74–99)
METER GLUCOSE: 197 MG/DL (ref 74–99)
ORGANISM: ABNORMAL

## 2020-08-01 PROCEDURE — 6370000000 HC RX 637 (ALT 250 FOR IP): Performed by: INTERNAL MEDICINE

## 2020-08-01 PROCEDURE — 6360000002 HC RX W HCPCS: Performed by: INTERNAL MEDICINE

## 2020-08-01 PROCEDURE — 2580000003 HC RX 258: Performed by: INTERNAL MEDICINE

## 2020-08-01 PROCEDURE — 82962 GLUCOSE BLOOD TEST: CPT

## 2020-08-01 RX ORDER — LEVOFLOXACIN 750 MG/1
750 TABLET ORAL DAILY
Qty: 14 TABLET | Refills: 0 | Status: SHIPPED | OUTPATIENT
Start: 2020-08-01 | End: 2020-08-15

## 2020-08-01 RX ORDER — LEVOFLOXACIN 750 MG/1
750 TABLET ORAL DAILY
Status: DISCONTINUED | OUTPATIENT
Start: 2020-08-01 | End: 2020-08-01 | Stop reason: HOSPADM

## 2020-08-01 RX ADMIN — PIPERACILLIN AND TAZOBACTAM 3.38 G: 3; .375 INJECTION, POWDER, FOR SOLUTION INTRAVENOUS at 00:47

## 2020-08-01 RX ADMIN — PANTOPRAZOLE SODIUM 40 MG: 40 TABLET, DELAYED RELEASE ORAL at 08:34

## 2020-08-01 RX ADMIN — SODIUM CHLORIDE: 9 INJECTION, SOLUTION INTRAVENOUS at 04:47

## 2020-08-01 RX ADMIN — PIPERACILLIN AND TAZOBACTAM 3.38 G: 3; .375 INJECTION, POWDER, FOR SOLUTION INTRAVENOUS at 10:37

## 2020-08-01 RX ADMIN — LEVOFLOXACIN 750 MG: 750 TABLET, FILM COATED ORAL at 12:30

## 2020-08-01 RX ADMIN — FLUOXETINE 20 MG: 20 CAPSULE ORAL at 08:33

## 2020-08-01 RX ADMIN — INSULIN LISPRO 2 UNITS: 100 INJECTION, SOLUTION INTRAVENOUS; SUBCUTANEOUS at 11:36

## 2020-08-01 RX ADMIN — INSULIN LISPRO 2 UNITS: 100 INJECTION, SOLUTION INTRAVENOUS; SUBCUTANEOUS at 08:34

## 2020-08-01 RX ADMIN — CLOPIDOGREL BISULFATE 75 MG: 75 TABLET ORAL at 08:34

## 2020-08-01 RX ADMIN — OMEGA-3-ACID ETHYL ESTERS 2 G: 1 CAPSULE, LIQUID FILLED ORAL at 08:41

## 2020-08-01 RX ADMIN — SODIUM CHLORIDE: 9 INJECTION, SOLUTION INTRAVENOUS at 00:02

## 2020-08-01 RX ADMIN — URSODIOL 300 MG: 300 CAPSULE ORAL at 08:41

## 2020-08-01 ASSESSMENT — PAIN - FUNCTIONAL ASSESSMENT: PAIN_FUNCTIONAL_ASSESSMENT: ACTIVITIES ARE NOT PREVENTED

## 2020-08-01 ASSESSMENT — PAIN DESCRIPTION - PROGRESSION: CLINICAL_PROGRESSION: RESOLVED

## 2020-08-01 ASSESSMENT — PAIN SCALES - GENERAL: PAINLEVEL_OUTOF10: 0

## 2020-08-01 NOTE — PLAN OF CARE
Problem: Pain:  Goal: Pain level will decrease  8/1/2020 1148 by Shannon King RN  Outcome: Met This Shift     Problem: Pain:  Goal: Control of acute pain  8/1/2020 1148 by Shannon King RN  Outcome: Met This Shift     Problem: Pain:  Goal: Control of chronic pain  8/1/2020 1148 by Shannon King RN  Outcome: Met This Shift     Problem: Nausea/Vomiting:  Goal: Absence of nausea/vomiting  8/1/2020 1148 by Shannon King RN  Outcome: Met This Shift     Problem: Nausea/Vomiting:  Goal: Able to drink  8/1/2020 1148 by Shannon King RN  Outcome: Met This Shift     Problem: Nausea/Vomiting:  Goal: Ability to achieve adequate nutritional intake will improve  8/1/2020 1148 by Shannon King RN  Outcome: Met This Shift     Problem: Falls - Risk of:  Goal: Will remain free from falls  8/1/2020 1148 by Shannon King RN  Outcome: Met This Shift     Problem: Falls - Risk of:  Goal: Absence of physical injury  8/1/2020 1148 by Shannon King RN  Outcome: Met This Shift

## 2020-08-01 NOTE — PLAN OF CARE
Problem: Pain:  Goal: Pain level will decrease  Description: Pain level will decrease  7/31/2020 2206 by Kinsey Maciel RN  Outcome: Met This Shift     Problem: Pain:  Goal: Control of acute pain  Description: Control of acute pain  7/31/2020 2206 by Kinsey Maciel RN  Outcome: Met This Shift     Problem: Pain:  Goal: Control of chronic pain  Description: Control of chronic pain  Outcome: Met This Shift     Problem: Nausea/Vomiting:  Goal: Absence of nausea/vomiting  Description: Absence of nausea/vomiting  7/31/2020 2206 by Kinsey Maciel RN  Outcome: Met This Shift     Problem: Nausea/Vomiting:  Goal: Able to drink  Description: Able to drink  7/31/2020 2206 by Kinsey Maciel RN  Outcome: Met This Shift     Problem: Nausea/Vomiting:  Goal: Able to eat  Description: Able to eat  7/31/2020 2206 by Kinsey Maciel RN  Outcome: Met This Shift     Problem: Nausea/Vomiting:  Goal: Ability to achieve adequate nutritional intake will improve  Description: Ability to achieve adequate nutritional intake will improve  7/31/2020 2206 by Kinsey Maciel RN  Outcome: Met This Shift     Problem: Falls - Risk of:  Goal: Will remain free from falls  Description: Will remain free from falls  7/31/2020 2206 by Kinsey Maciel RN  Outcome: Met This Shift     Problem: Falls - Risk of:  Goal: Absence of physical injury  Description: Absence of physical injury  7/31/2020 2206 by Kinsey Maciel RN  Outcome: Met This Shift

## 2020-08-01 NOTE — PROGRESS NOTES
[facility]  RONALD  Progress Note    Chief complain:  \"I want to go home\"    SUBJECTIVE:    Patient is awake, alert , seen at bedside  She is anxious regarding discharge   She is tolerating antibiotics well without side effects  No abdominal pain, nausea, vomiting ,diarrhea, fever, chills, rash  She is clinically improved and would like to go home     ROS:  Negative except as above     OBJECTIVE:    Vitals:    07/31/20 0515 07/31/20 1745 08/01/20 0615 08/01/20 0833   BP: (!) 96/53 132/87 (!) 95/58 114/69   Pulse: 63 65 57 59   Resp: 14 16 16    Temp: 98.5 °F (36.9 °C) 99.1 °F (37.3 °C) 97.7 °F (36.5 °C)    TempSrc: Oral Oral Oral    SpO2: 92% 96% 97%    Weight:       Height:           HEENT :         unremarkable INC BMI   Heart:             RRR,  No murmurs, no gallops  Lungs:            clear to auscultation, no wheezing , no rales  Abdomen:       soft, non tender, bowel sounds present  Extremites:     No edema, no ulcers,  Skin:                Normal turgor,normal texture  piv                   Current Facility-Administered Medications   Medication Dose Route Frequency Provider Last Rate Last Dose    levoFLOXacin (LEVAQUIN) tablet 750 mg  750 mg Oral Daily Savana Adjutant, APRN - NP        metoprolol succinate (TOPROL XL) extended release tablet 25 mg  25 mg Oral BID Janeen Mahan MD   25 mg at 07/31/20 2122    FLUoxetine (PROZAC) capsule 20 mg  20 mg Oral Daily Janeen Mahan MD   20 mg at 08/01/20 8740    aspirin chewable tablet 81 mg  81 mg Oral Nightly Janeen Mahan MD   81 mg at 07/31/20 2122    pantoprazole (PROTONIX) tablet 40 mg  40 mg Oral BID Janeen Mahan MD   40 mg at 08/01/20 0834    tiZANidine (ZANAFLEX) tablet 4 mg  4 mg Oral Nightly Janeen Mahan MD   4 mg at 07/31/20 2122    ursodiol (ACTIGALL) capsule 300 mg  300 mg Oral BID Janeen Mahan MD   300 mg at 08/01/20 0841    clopidogrel (PLAVIX) tablet 75 mg  75 mg Oral Daily [facility]   75 mg at 08/01/20 0834    fluticasone (FLONASE) 50 MCG/ACT nasal spray 2 spray  2 spray Nasal PRN Kameron Butler MD        sodium chloride flush 0.9 % injection 10 mL  10 mL Intravenous 2 times per day Kameron Butler MD   10 mL at 07/31/20 0903    sodium chloride flush 0.9 % injection 10 mL  10 mL Intravenous PRN Kameron Butler MD   10 mL at 07/30/20 1636    acetaminophen (TYLENOL) tablet 650 mg  650 mg Oral Q6H PRN Kameron Butler MD        Or    acetaminophen (TYLENOL) suppository 650 mg  650 mg Rectal Q6H PRN Kameron Butler MD        polyethylene glycol (GLYCOLAX) packet 17 g  17 g Oral Daily PRN Kameron Butler MD        promethazine (PHENERGAN) tablet 12.5 mg  12.5 mg Oral Q6H PRN Kameron Butler MD   12.5 mg at 07/28/20 1337    Or    ondansetron (ZOFRAN) injection 4 mg  4 mg Intravenous Q6H PRN Kameron Butler MD   4 mg at 07/30/20 1635    enoxaparin (LOVENOX) injection 40 mg  40 mg Subcutaneous Daily Kameron Butler MD   40 mg at 07/31/20 2122    insulin lispro (HUMALOG) injection vial 0-12 Units  0-12 Units Subcutaneous TID WC Kameron Butler MD   2 Units at 08/01/20 1136    insulin lispro (HUMALOG) injection vial 0-6 Units  0-6 Units Subcutaneous Nightly Kameron Butler MD   2 Units at 07/31/20 2126    glucose (GLUTOSE) 40 % oral gel 15 g  15 g Oral PRN Kameron Butler MD        dextrose 50 % IV solution  12.5 g Intravenous PRN Kameron Butler MD        glucagon (rDNA) injection 1 mg  1 mg Intramuscular PRN Kameron Butler MD        dextrose 5 % solution  100 mL/hr Intravenous PRN Kameron Butler MD        omega-3 acid ethyl esters (LOVAZA) capsule 2 g  2 g Oral BID Kameron Butler MD   2 g at 08/01/20 0841    insulin glargine (LANTUS) injection vial 30 Units  30 Units Subcutaneous Nightly Kameron Butler MD   30 Units at 07/31/20 2126    diphenhydrAMINE (BENADRYL) tablet 50 mg  50 mg Oral Nightly Kameron Butler MD   50 mg at 07/31/20 2122        LABS    CBC:     WBC CRP    Microbiology :  Blood Culture, Routine   Date Value Ref Range Status   07/26/2020 5 Days no growth  Final     No results found for: Manasa Story  Urine Culture, Routine   Date Value Ref Range Status   01/02/2020 <10,000 CFU/mL  Mixed gram positive organisms    Final     No results found for: CULTRESP  No results found for: Psychiatric        Radiology :  Reviewed     ASSESSMENT:   This is a 48year old female with history of recurrent gallstones, hiatal hernia who presented to ER 7/26/20 with right side abdominal pain and epigastric pain. She had a cholecystectomy in 1997 and ERCP with stents placed in January then removed in February. MRI showed choledocholithiasis with 9 mm calculus in common bile duct. Patient has been compliant with Ursodiol since stent removal in February 2020. In ER, patient had abnormal abdominal ultrasound and elevated LFTs. She underwent ERCP 7/30 with stone removed and sphincteroplasty. Blood cultures returned positive for GNR 1/2 bottles after 5 days for which ID was consulted.      BC Klebsiella pneumoniae     PLAN:  Will DC zosyn and start on Levaquin x 14 days - med rec done   Ok to DC from Id standpoint - needs ID follow up outpatient   Ok to DC from other disciplines   All labs, imaging, cultures reviewed       VELASQUEZ Whitesdie - NP  8/1/2020  12:18 PM     I have discussed the case, including pertinent history and physical  exam findings . I have seen and examined the patient and the key elements of the encounter have been performed by me. I agree with the assessment, plan and orders as documented.       Treatment plan as per my recommendation     Savanna Ribera MD, FACP  8/1/2020  2:16 PM

## 2020-08-01 NOTE — PROGRESS NOTES
Internal Medicine Progress Note    [facility]DRA=[facility]    Westley Coronadodusty. Morgan Lazo., ELDER.IVANNAOMartinaIMartina Remy D.O., ELMAOWILFREDO Russ D.O. Jagdeep Angel, MSN, APRN, NP-C  Kevin Grief. Olive Sinha, MSN, APRN-CNP     Primary Care Physician: Fany Vera MD   Admitting Physician:  Ventura Oseguera MD  Admission date and time: 7/26/2020  1:43 PM    Room:  [address] [place] [address]  Admitting diagnosis: Choledocholithiasis [K80.50]    Patient Name: Miriam Garces  MRN: [id number]    Date of Service: 8/1/2020     Covering for Dr. Jose Rao:    Bogdan Crowe is a 48 y. o.  female who was seen and examined today,8/1/2020, at the bedside. Mild abdominal pain. Anxious for discharge, No fever or chills. Discussed blood culture results. Understands that her discharge is at the discretion of the Infectious Disease team.     No family present during my examination. Review of System:    Constitutional:   Denies fever or chills, weight loss or gain, fatigue or malaise. HEENT:   Denies ear pain, sore throat, sinus or eye problems. Cardiovascular:   Denies any chest pain, irregular heartbeats, or palpitations. Respiratory:   Denies shortness of breath, coughing, sputum production, hemoptysis, or wheezing. Gastrointestinal:   Denies nausea, vomiting, diarrhea, or constipation. Mild abdominal pain. Genitourinary:    Denies any urgency, frequency, hematuria. Voiding  without difficulty. Extremities:   Denies lower extremity swelling, edema or cyanosis. Neurology:    Denies any headache or focal neurological deficits, Denies generalized weakness or memory difficulty. Psch:   Denies being anxious or depressed. Musculoskeletal:    Denies  myalgias, joint complaints or back pain. Integumentary:   Denies any rashes, ulcers, or excoriations. Denies bruising. Hematologic/Lymphatic:  Denies bruising or bleeding. Physical Exam:  No intake/output data recorded.     Intake/Output Summary (Last 24 hours) at 8/1/2020 0635  Last data filed at 7/31/2020 2239  Gross per 24 hour   Intake 720 ml   Output 1700 ml   Net -980 ml   I/O last 3 completed shifts: In: 5 [P.O.:720]  Out: 1700 [Urine:1700]  No data found. Vital Signs:   Blood pressure (!) 95/58, pulse 57, temperature 97.7 °F (36.5 °C), temperature source Oral, resp. rate 16, height 5' 3\" (1.6 m), weight 224 lb (101.6 kg), SpO2 97 %, not currently breastfeeding. Arabella Cerda is a 48 y. o.  female who is alert, responsive, oriented to person, place, and time. General appearance:   Well preserved, alert, no distress. Head:  Normocephalic. No masses, lesions or tenderness. Eyes:  PERRLA. EOMI. Sclera clear. Impaired vision. ENT:  Ears normal. Mucosa normal.  Neck:    Supple. Trachea midline. No thyromegaly. No JVD. No bruits. Heart:    Rhythm regular. Rate controlled. No murmurs. Lungs:    Symmetrical. Clear to auscultation bilaterally. No wheezes. No rhonchi. No rales. Abdomen:   Soft. Non-tender. Non-distended. Bowel sounds positive. No organomegaly or masses. Mild pain on palpation. Obese  Extremities:    Peripheral pulses present. No peripheral edema. No ulcers. No cyanosis. No clubbing. Neurologic:    Alert x 3. No focal deficit. Cranial nerves grossly intact. No focal weakness. Psych:   Behavior is normal. Mood appears normal. Speech is not rapid and/or pressured. Musculoskeletal:   Spine ROM normal. Muscular strength intact. Gait not assessed. Skin:    No rashes  Skin normal color and texture. Genitalia/Breast:  Deferred    Allergy:  Allergies   Allergen Reactions    Latex Rash    Morphine     Other      ENVIRONMENTAL.     Statins Other (See Comments)     Liver enlargement    Adhesive Tape Rash    Sulfa Antibiotics Rash        Medication:  Scheduled Meds:   metoprolol succinate  25 mg Oral BID    FLUoxetine  20 mg Oral Daily    aspirin  81 mg Oral Nightly    pantoprazole  40 mg Oral BID    tiZANidine  4 mg Oral Nightly    ursodiol  300 mg Oral BID    clopidogrel  75 mg Oral Daily    sodium chloride flush  10 mL Intravenous 2 times per day    enoxaparin  40 mg Subcutaneous Daily    insulin lispro  0-12 Units Subcutaneous TID WC    insulin lispro  0-6 Units Subcutaneous Nightly    omega-3 acid ethyl esters  2 g Oral BID    insulin glargine  30 Units Subcutaneous Nightly    diphenhydrAMINE  50 mg Oral Nightly    piperacillin-tazobactam  3.375 g Intravenous Q8H     Continuous Infusions:   dextrose      sodium chloride 12.5 mL/hr at 08/01/20 0447       Objective Data:  CBC:   Recent Labs     07/30/20  0457 07/31/20  0510   WBC 5.2 5.9   HGB 11.7 11.1*    295     BMP:    Recent Labs     07/30/20  0457 07/31/20  0510    140   K 3.8 3.9    103   CO2 24 23   BUN 6 7   CREATININE 1.0 0.9   GLUCOSE 79 256*     CMP:    Lab Results   Component Value Date     07/31/2020    K 3.9 07/31/2020    K 3.7 07/27/2020     07/31/2020    CO2 23 07/31/2020    BUN 7 07/31/2020    CREATININE 0.9 07/31/2020    GFRAA >60 07/31/2020    LABGLOM >60 07/31/2020    GLUCOSE 256 07/31/2020    GLUCOSE 150 02/23/2012    PROT 6.2 07/31/2020    LABALBU 3.0 07/31/2020    LABALBU 4.0 10/27/2011    CALCIUM 8.7 07/31/2020    BILITOT 0.3 07/31/2020    ALKPHOS 186 07/31/2020    AST 19 07/31/2020    ALT 46 07/31/2020     Hepatic:   Recent Labs     07/30/20  0457 07/31/20  0510   AST 35* 19   ALT 73* 46*   BILITOT 0.5 0.3   ALKPHOS 223* 186*     Troponin: No results for input(s): TROPONINI in the last 72 hours. BNP: No results for input(s): BNP in the last 72 hours. Lipids: No results for input(s): CHOL, HDL in the last 72 hours. Invalid input(s): LDLCALCU  ABGs: No results found for: PHART, PO2ART, BXL4PST  INR: No results for input(s): INR, PROTIME in the last 72 hours.   RAD: Mri Abdomen W Wo Contrast Mrcp    Result Date: 7/27/2020  EXAMINATION: MRI OF THE ABDOMEN WITH AND WITHOUT CONTRAST AND MRCP 7/27/2020 4:22 pm TECHNIQUE: Multiplanar multisequence MRI of the abdomen was performed with and without the administration of intravenous contrast.  After initial T2 axial and coronal images, thick slab, thin slab and 3D coronal MRCP sequences were obtained without the administration of intravenous contrast.  MIP images are provided for review. COMPARISON: Ultrasound 7/26/2020, CT abdomen and pelvis 1/2/2020 HISTORY: ORDERING SYSTEM PROVIDED HISTORY: Elevated LFT\"s and abnormal RUQ US TECHNOLOGIST PROVIDED HISTORY: Reason for exam:->Elevated LFT\"s and abnormal RUQ US FINDINGS: Lung bases are clear. No focal liver lesion. Loss of signal on out of phase imaging is consistent with hepatic steatosis. Gallbladder: Cholecystectomy. Bile Ducts: No intrahepatic ductal dilatation. Common bile duct is within normal limits for prior cholecystectomy measuring up to 8-9 mm. Within the distal common bile duct, there is a 9 mm filling defect. Pancreatic Duct: Pancreatic duct is within normal limits for size. No pancreatic lesion. Other:  No splenomegaly. No adrenal lesion. No hydronephrosis. Left renal cyst.  No free fluid within the abdomen. No evidence of bowel obstruction. No soft tissue abnormality. No suspicious bone marrow replacing lesion. Postcontrast imaging demonstrates no abnormal enhancement. Choledocholithiasis with a 9 mm calculus within the distal common bile duct. Us Abdomen Limited    Result Date: 7/26/2020  EXAMINATION: RIGHT UPPER QUADRANT ULTRASOUND 7/26/2020 3:26 pm COMPARISON: January 5, 2020 HISTORY: ORDERING SYSTEM PROVIDED HISTORY: RUQ pain, h/o cholecystectomy TECHNOLOGIST PROVIDED HISTORY: Reason for exam:->RUQ pain, h/o cholecystectomy Reason for exam:->h/o choledocho, recurrent stones. r/o dilated ducts or stone. Acute right upper quadrant abdominal pain. Prior cholecystectomy. FINDINGS: LIVER:  Increased echogenicity of the liver.   No focal hepatic lesion or evidence of intrahepatic ductal dilatation. BILIARY SYSTEM:  Cholecystectomy. Common bile duct is within normal limits measuring 0.5 cm. There is an approximately 4 mm echogenic focus in the common bile duct with no appreciable shadowing. RIGHT KIDNEY: The right kidney is grossly unremarkable without evidence of hydronephrosis. PANCREAS:  Visualized portions of the pancreas are unremarkable. OTHER: No evidence of right upper quadrant ascites. 1. No convincing finding to account for patient's right upper quadrant abdominal pain. There is a questionable 4 mm echogenic focus in the common bile duct with no associated intra or extrahepatic ductal dilatation. Differential includes ductal debris and retained stone. If there is clinical concern for choledocholithiasis, consider MRCP for further evaluation. 2. Hepatic steatosis. 3. Cholecystectomy. Wound Documentation:        Chronic Hospital Medical Problems:  Past Medical History:   Diagnosis Date    Back pain     CAD in native artery 3/16/2020    Depression with anxiety     Diabetes mellitus ([address]     Fatty liver     Gallstones     GERD (gastroesophageal reflux disease)     Hiatal hernia     Hyperlipidemia     Hypertension     Metabolic disorder     MTHFR (methylene THF reductase) deficiency and homocystinuria (HCC)     MTHFR mutation ([address]     Nasal septal deviation     procedure 10/27/2014    NSTEMI (non-ST elevated myocardial infarction) ([address]     Preoperative clearance 10/20/2014    medical-Dr. Floyd Bull; paper copy on chart for procedure 10/27/2014    Prolonged emergence from general anesthesia     Sleep apnea     cpap    Unspecified diseases of blood and blood-forming organs      Principal Problem:    Choledocholithiasis  Active Problems:    Elevated LFTs    Abdominal pain    Nausea and vomiting in adult    MTHFR mutation ([address]    Diabetes mellitus ([address]    CAD in native artery  Resolved Problems:    * No resolved hospital problems. *      Assessment:   Choledocholithiasis   Elevated LFTs    Abdominal pain    Nausea and vomiting in adult    MTHFR mutation     Diabetes mellitus     CAD in native artery    Plan:   ·  final blood cx results reviewed-await ID recommendations. Cont zosyn in the meantime  · Ok for d/c when ok with ID  · Increase activity as tolerated. · Monitor vital signs-temp 99.1 yesterday at 1745  · Continue current medications for comorbid conditions. More than 50% of my  time was spent at the bedside counseling and/or coordination of care with the patient and/or family with face to face contact. This time was spent reviewing notes and laboratory data, instructing and counseling the patient. Time I spent with the family or surrogate(s) is included only if the patient was incapable of providing the necessary information or participating in medical decisionsI also discussed the differential diagnosis and all of the proposed management plans with the patient and individuals accompanying the patient. I reviewed the patient's past medical, surgical history and medication. Please see orders for further plan of care. Reviewed consultant recommendations/notes and/or discussion. Patient's medications were reviewed/continued/adjusted. Labs as ordered. Please see orders for further plan of care. I reviewed the  course of events since last visit. Kaden Carter DO, VIVIENNE.C.O.I.   On 8/1/2020  6:35 AM

## 2020-08-03 NOTE — DISCHARGE SUMMARY
present, nontender, nondistended   No clubbing, cyanosis, or edema   Neuro - at baseline     Pertinent Results during this Hospital Course:  Xr Hand Left (min 3 Views)    Result Date: 7/10/2020  EXAMINATION: THREE XRAY VIEWS OF THE LEFT HAND 7/10/2020 10:18 pm COMPARISON: None. HISTORY: ORDERING SYSTEM PROVIDED HISTORY: hand pain TECHNOLOGIST PROVIDED HISTORY: Reason for exam:->hand pain FINDINGS: Bones are intact and in anatomic alignment. Joint spaces are maintained. No juxta-articular/marginal erosions. Unremarkable soft tissues. No acute osseous abnormality or evidence of an erosive arthropathy. Fl Ercp Biliary And Pancreatic S&i    Result Date: 7/30/2020  EXAMINATION: SPOT IMAGES FROM AN ERCP COMPARISON: None FLUOROSCOPY DOSE OR TIME/IMAGES: Fluoroscopy time 124.4 seconds HISTORY: ORDERING SYSTEM PROVIDED HISTORY: ERCP TECHNOLOGIST PROVIDED HISTORY: Reason for exam:->ERCP FINDINGS: Endoscopy and cannulation of the major papilla was performed by the gastroenterology service under the supervision of [address]. Spot views are presented for interpretation. Contrast is identified in the intrahepatic and extrahepatic bile ducts. No intraluminal filling defects or strictures are identified in the common bile duct, common hepatic duct or visualized portions of the intrahepatic bile ducts. No contrast extravasation is identified. Right upper quadrant surgical clips identified. Unremarkable ERCP images. Please refer to the procedure report for further details.      Mri Abdomen W Wo Contrast Mrcp    Result Date: 7/27/2020  EXAMINATION: MRI OF THE ABDOMEN WITH AND WITHOUT CONTRAST AND MRCP 7/27/2020 4:22 pm TECHNIQUE: Multiplanar multisequence MRI of the abdomen was performed with and without the administration of intravenous contrast.  After initial T2 axial and coronal images, thick slab, thin slab and 3D coronal MRCP sequences were obtained without the administration of intravenous contrast. MIP images are provided for review. COMPARISON: Ultrasound 7/26/2020, CT abdomen and pelvis 1/2/2020 HISTORY: ORDERING SYSTEM PROVIDED HISTORY: Elevated LFT\"s and abnormal RUQ US TECHNOLOGIST PROVIDED HISTORY: Reason for exam:->Elevated LFT\"s and abnormal RUQ US FINDINGS: Lung bases are clear. No focal liver lesion. Loss of signal on out of phase imaging is consistent with hepatic steatosis. Gallbladder: Cholecystectomy. Bile Ducts: No intrahepatic ductal dilatation. Common bile duct is within normal limits for prior cholecystectomy measuring up to 8-9 mm. Within the distal common bile duct, there is a 9 mm filling defect. Pancreatic Duct: Pancreatic duct is within normal limits for size. No pancreatic lesion. Other:  No splenomegaly. No adrenal lesion. No hydronephrosis. Left renal cyst.  No free fluid within the abdomen. No evidence of bowel obstruction. No soft tissue abnormality. No suspicious bone marrow replacing lesion. Postcontrast imaging demonstrates no abnormal enhancement. Choledocholithiasis with a 9 mm calculus within the distal common bile duct. Us Abdomen Limited    Result Date: 7/26/2020  EXAMINATION: RIGHT UPPER QUADRANT ULTRASOUND 7/26/2020 3:26 pm COMPARISON: January 5, 2020 HISTORY: ORDERING SYSTEM PROVIDED HISTORY: RUQ pain, h/o cholecystectomy TECHNOLOGIST PROVIDED HISTORY: Reason for exam:->RUQ pain, h/o cholecystectomy Reason for exam:->h/o choledocho, recurrent stones. r/o dilated ducts or stone. Acute right upper quadrant abdominal pain. Prior cholecystectomy. FINDINGS: LIVER:  Increased echogenicity of the liver. No focal hepatic lesion or evidence of intrahepatic ductal dilatation. BILIARY SYSTEM:  Cholecystectomy. Common bile duct is within normal limits measuring 0.5 cm. There is an approximately 4 mm echogenic focus in the common bile duct with no appreciable shadowing. RIGHT KIDNEY: The right kidney is grossly unremarkable without evidence of hydronephrosis. daily, Disp-90 tablet,R-3Normal      diphenhydrAMINE (BENADRYL) 50 MG capsule Take 50 mg by mouth nightlyHistorical Med      insulin lispro (HUMALOG) 100 UNIT/ML injection vial Inject 8 Units into the skin 3 times daily (with meals), Disp-5 vial,R-0Normal      Insulin Degludec (TRESIBA FLEXTOUCH SC) Inject 62 Units into the skin nightly Historical Med      TiZANidine HCl (ZANAFLEX PO) Take 4 mg by mouth nightly Historical Med      fluticasone (FLONASE) 50 MCG/ACT nasal spray 2 sprays by Nasal route daily 2 sprays in each nostril daily, Disp-3 Bottle,R-2Normal      pantoprazole (PROTONIX) 40 MG tablet Take 40 mg by mouth 2 times daily Historical Med      triamterene-hydrochlorothiazide (MAXZIDE) 75-50 MG per tablet Take 1 tablet by mouth daily. FLUoxetine (PROZAC) 20 MG capsule Take 20 mg by mouth daily Historical Med      aspirin 81 MG tablet Take 81 mg by mouth nightly Historical Med                        Discharge Medication List as of 8/1/2020  1:29 PM      STOP taking these medications       metoprolol (TOPROL-XL) 50 MG XL tablet Comments:   Reason for Stopping:             Disposition:   Home on po abx as per ID    Pt advised to monitor bp at home and if low then hold bp meds  Follow-up in office in next 1-2 weeks. F/w GI as well  Patient advised to bring all meds to appointment.     Note that over 30 minutes was spent in preparing discharge papers, discussing discharge with patient, nursing and ancillary staff, medication review, etc.    Signed:  Ashlee Mccarthy MD  8/1/2020, 3:33 PM

## 2020-08-12 ENCOUNTER — HOSPITAL ENCOUNTER (EMERGENCY)
Age: 50
Discharge: HOME | End: 2020-08-12
Attending: EMERGENCY MEDICINE
Payer: COMMERCIAL

## 2020-08-12 ENCOUNTER — APPOINTMENT (OUTPATIENT)
Dept: GENERAL RADIOLOGY | Age: 50
End: 2020-08-12
Payer: COMMERCIAL

## 2020-08-12 VITALS
RESPIRATION RATE: 16 BRPM | SYSTOLIC BLOOD PRESSURE: 110 MMHG | OXYGEN SATURATION: 98 % | HEIGHT: 63 IN | DIASTOLIC BLOOD PRESSURE: 80 MMHG | BODY MASS INDEX: 38.98 KG/M2 | TEMPERATURE: 98.8 F | HEART RATE: 103 BPM | WEIGHT: 220 LBS

## 2020-08-12 LAB
ALBUMIN SERPL-MCNC: 3.7 G/DL (ref 3.5–5.2)
ALP BLD-CCNC: 84 U/L (ref 35–104)
ALT SERPL-CCNC: 14 U/L (ref 0–32)
ANION GAP SERPL CALCULATED.3IONS-SCNC: 18 MMOL/L (ref 7–16)
AST SERPL-CCNC: 22 U/L (ref 0–31)
BACTERIA: ABNORMAL /HPF
BASOPHILS ABSOLUTE: 0.05 E9/L (ref 0–0.2)
BASOPHILS RELATIVE PERCENT: 0.7 % (ref 0–2)
BILIRUB SERPL-MCNC: 0.5 MG/DL (ref 0–1.2)
BILIRUBIN URINE: NEGATIVE
BLOOD, URINE: ABNORMAL
BUN BLDV-MCNC: 21 MG/DL (ref 6–20)
CALCIUM SERPL-MCNC: 8.3 MG/DL (ref 8.6–10.2)
CHLORIDE BLD-SCNC: 97 MMOL/L (ref 98–107)
CLARITY: CLEAR
CO2: 20 MMOL/L (ref 22–29)
COLOR: YELLOW
CREAT SERPL-MCNC: 1.5 MG/DL (ref 0.5–1)
EOSINOPHILS ABSOLUTE: 0.1 E9/L (ref 0.05–0.5)
EOSINOPHILS RELATIVE PERCENT: 1.4 % (ref 0–6)
EPITHELIAL CELLS, UA: ABNORMAL /HPF
GFR AFRICAN AMERICAN: 44
GFR NON-AFRICAN AMERICAN: 37 ML/MIN/1.73
GLUCOSE BLD-MCNC: 140 MG/DL (ref 74–99)
GLUCOSE URINE: NEGATIVE MG/DL
HCT VFR BLD CALC: 35.9 % (ref 34–48)
HEMOGLOBIN: 12.3 G/DL (ref 11.5–15.5)
HYALINE CASTS: ABNORMAL /LPF (ref 0–2)
IMMATURE GRANULOCYTES #: 0.01 E9/L
IMMATURE GRANULOCYTES %: 0.1 % (ref 0–5)
KETONES, URINE: ABNORMAL MG/DL
LEUKOCYTE ESTERASE, URINE: NEGATIVE
LIPASE: 50 U/L (ref 13–60)
LYMPHOCYTES ABSOLUTE: 1.38 E9/L (ref 1.5–4)
LYMPHOCYTES RELATIVE PERCENT: 19.9 % (ref 20–42)
MCH RBC QN AUTO: 28.3 PG (ref 26–35)
MCHC RBC AUTO-ENTMCNC: 34.3 % (ref 32–34.5)
MCV RBC AUTO: 82.7 FL (ref 80–99.9)
MONOCYTES ABSOLUTE: 0.59 E9/L (ref 0.1–0.95)
MONOCYTES RELATIVE PERCENT: 8.5 % (ref 2–12)
NEUTROPHILS ABSOLUTE: 4.8 E9/L (ref 1.8–7.3)
NEUTROPHILS RELATIVE PERCENT: 69.4 % (ref 43–80)
NITRITE, URINE: NEGATIVE
PDW BLD-RTO: 12.8 FL (ref 11.5–15)
PH UA: 5.5 (ref 5–9)
PLATELET # BLD: 284 E9/L (ref 130–450)
PMV BLD AUTO: 10.4 FL (ref 7–12)
POTASSIUM SERPL-SCNC: 3.3 MMOL/L (ref 3.5–5)
PROTEIN UA: NEGATIVE MG/DL
RBC # BLD: 4.34 E12/L (ref 3.5–5.5)
RBC UA: ABNORMAL /HPF (ref 0–2)
SODIUM BLD-SCNC: 135 MMOL/L (ref 132–146)
SPECIFIC GRAVITY UA: 1.02 (ref 1–1.03)
TOTAL PROTEIN: 6.7 G/DL (ref 6.4–8.3)
TROPONIN: <0.01 NG/ML (ref 0–0.03)
UROBILINOGEN, URINE: 0.2 E.U./DL
WBC # BLD: 6.9 E9/L (ref 4.5–11.5)
WBC UA: ABNORMAL /HPF (ref 0–5)

## 2020-08-12 PROCEDURE — 99285 EMERGENCY DEPT VISIT HI MDM: CPT

## 2020-08-12 PROCEDURE — 80053 COMPREHEN METABOLIC PANEL: CPT

## 2020-08-12 PROCEDURE — 83690 ASSAY OF LIPASE: CPT

## 2020-08-12 PROCEDURE — 96360 HYDRATION IV INFUSION INIT: CPT

## 2020-08-12 PROCEDURE — 87324 CLOSTRIDIUM AG IA: CPT

## 2020-08-12 PROCEDURE — 96361 HYDRATE IV INFUSION ADD-ON: CPT

## 2020-08-12 PROCEDURE — 36415 COLL VENOUS BLD VENIPUNCTURE: CPT

## 2020-08-12 PROCEDURE — 71045 X-RAY EXAM CHEST 1 VIEW: CPT

## 2020-08-12 PROCEDURE — 81001 URINALYSIS AUTO W/SCOPE: CPT

## 2020-08-12 PROCEDURE — 84484 ASSAY OF TROPONIN QUANT: CPT

## 2020-08-12 PROCEDURE — 93005 ELECTROCARDIOGRAM TRACING: CPT | Performed by: EMERGENCY MEDICINE

## 2020-08-12 PROCEDURE — 85025 COMPLETE CBC W/AUTO DIFF WBC: CPT

## 2020-08-12 PROCEDURE — 87449 NOS EACH ORGANISM AG IA: CPT

## 2020-08-12 PROCEDURE — 2580000003 HC RX 258: Performed by: EMERGENCY MEDICINE

## 2020-08-12 PROCEDURE — 99284 EMERGENCY DEPT VISIT MOD MDM: CPT

## 2020-08-12 RX ORDER — 0.9 % SODIUM CHLORIDE 0.9 %
1000 INTRAVENOUS SOLUTION INTRAVENOUS ONCE
Status: COMPLETED | OUTPATIENT
Start: 2020-08-12 | End: 2020-08-12

## 2020-08-12 RX ORDER — 0.9 % SODIUM CHLORIDE 0.9 %
1000 INTRAVENOUS SOLUTION INTRAVENOUS ONCE
Status: DISCONTINUED | OUTPATIENT
Start: 2020-08-12 | End: 2020-08-12

## 2020-08-12 RX ORDER — ONDANSETRON 4 MG/1
4 TABLET, ORALLY DISINTEGRATING ORAL EVERY 8 HOURS PRN
Qty: 10 TABLET | Refills: 0 | Status: SHIPPED | OUTPATIENT
Start: 2020-08-12

## 2020-08-12 RX ADMIN — SODIUM CHLORIDE 1000 ML: 9 INJECTION, SOLUTION INTRAVENOUS at 21:09

## 2020-08-12 ASSESSMENT — ENCOUNTER SYMPTOMS
RHINORRHEA: 0
SHORTNESS OF BREATH: 0
SINUS PAIN: 0
COUGH: 0
NAUSEA: 0
SINUS PRESSURE: 0
DIARRHEA: 1
BACK PAIN: 0
VOMITING: 0
CONSTIPATION: 0
ABDOMINAL PAIN: 0
PHOTOPHOBIA: 0
SORE THROAT: 0
WHEEZING: 0

## 2020-08-13 LAB
C DIFF TOXIN/ANTIGEN: NORMAL
EKG ATRIAL RATE: 95 BPM
EKG P AXIS: 15 DEGREES
EKG P-R INTERVAL: 128 MS
EKG Q-T INTERVAL: 378 MS
EKG QRS DURATION: 90 MS
EKG QTC CALCULATION (BAZETT): 475 MS
EKG R AXIS: -26 DEGREES
EKG T AXIS: 23 DEGREES
EKG VENTRICULAR RATE: 95 BPM

## 2020-08-13 NOTE — ED PROVIDER NOTES
Patient is a 54-year-old female who presents the chief complaint of dizziness and diarrhea. Patient states that on 8/1/2020 she was discharged in the hospital after having an ERCP and was found to have choledocholithiasis and grew gram-negative rods blood cultures. The patient was discharged home on Levaquin. Patient states that since she has been discharged home and on the antibiotic she has had onset of dizziness, diarrhea, and loss of taste and smell. Patient states that she is more dizzy when she sits stands up and walks around or moves her head rapidly. It is better when she is resting. Patient does not describe as being vertiginous, she states that she just feels unsteady. She also admits to having about 7-8 episodes of watery, nonbloody diarrhea daily. She has had a decreased appetite recently and has to force himself to eat anything. She denies any fevers, chills, chest pain, shortness of breath, abdominal pain, nausea, vomiting, dysuria, hematuria. No other medication changes. No known sick contacts. No recent head trauma or falls. The history is provided by the patient. No  was used. Review of Systems   Constitutional: Negative for chills, fatigue and fever. HENT: Negative for congestion, rhinorrhea, sinus pressure, sinus pain, sneezing and sore throat. Eyes: Negative for photophobia and visual disturbance. Respiratory: Negative for cough, shortness of breath and wheezing. Cardiovascular: Negative for chest pain and palpitations. Gastrointestinal: Positive for diarrhea. Negative for abdominal pain, constipation, nausea and vomiting. Genitourinary: Negative for dysuria, frequency and hematuria. Musculoskeletal: Negative for back pain, neck pain and neck stiffness. Skin: Negative for rash and wound. Neurological: Positive for dizziness. Negative for light-headedness and headaches.    Psychiatric/Behavioral: Negative for agitation, behavioral problems and confusion. Physical Exam  Constitutional:       General: She is not in acute distress. Appearance: She is not toxic-appearing. HENT:      Head: Normocephalic and atraumatic. Right Ear: Tympanic membrane normal.      Left Ear: Tympanic membrane normal.      Ears:      Comments: Normal TMs bilaterally. Mouth/Throat:      Mouth: Mucous membranes are moist.   Eyes:      General: No scleral icterus. Pupils: Pupils are equal, round, and reactive to light. Comments: Pupils equal and reactive bilaterally. No nystagmus present. Neck:      Musculoskeletal: Normal range of motion. No neck rigidity. Cardiovascular:      Rate and Rhythm: Normal rate. Heart sounds: No murmur. No gallop. Pulmonary:      Effort: No respiratory distress. Breath sounds: Normal breath sounds. No wheezing. Abdominal:      General: There is no distension. Palpations: Abdomen is soft. Tenderness: There is no abdominal tenderness. Comments: No abdominal tenderness palpation. Abdomen soft, nondistended. No guarding rigidity. Musculoskeletal: Normal range of motion. General: No swelling or deformity. Lymphadenopathy:      Cervical: No cervical adenopathy. Skin:     General: Skin is warm. Capillary Refill: Capillary refill takes less than 2 seconds. Coloration: Skin is not jaundiced. Findings: No bruising. Neurological:      Mental Status: She is alert and oriented to person, place, and time. Cranial Nerves: No cranial nerve deficit. Motor: No weakness. Comments: Patient is awake, alert, oriented x3. No focal neurological deficits. No ataxia with finger-to-nose of bilateral upper extremities. No nystagmus present. Psychiatric:         Mood and Affect: Mood normal.         Thought Content:  Thought content normal.          Procedures     [facility]     ED Course as of Aug 12 2244   Wed Aug 12, 2020   2242 Patient was updated on her laboratory results. Patient states that she is feeling much better after the IV fluids. She did provide a stool sample and that will be sent off to the lab for C. difficile testing. The patient will be discharged home at this time, vitals have improved. Again she is feeling much better after the hydration. She was dehydrated according to CMP and urinalysis. She did have trace ketones. Patient has no new focal neurological deficits. She will follow with her PCP. [MS]      ED Course User Index  [MS] Rylie DO Magno      ED Course as of Aug 12 2244   Wed Aug 12, 2020   2242 Patient was updated on her laboratory results. Patient states that she is feeling much better after the IV fluids. She did provide a stool sample and that will be sent off to the lab for C. difficile testing. The patient will be discharged home at this time, vitals have improved. Again she is feeling much better after the hydration. She was dehydrated according to CMP and urinalysis. She did have trace ketones. Patient has no new focal neurological deficits. She will follow with her PCP. [MS]      ED Course User Index  [MS] Rylie Magno        --------------------------------------------- PAST HISTORY ---------------------------------------------  Past Medical History:  has a past medical history of Back pain, CAD in native artery, Depression with anxiety, Diabetes [facility] Fatty liver, Gallstones, GERD (gastroesophageal reflux disease), Hiatal hernia, Hyperlipidemia, Hypertension, Metabolic disorder, MTHFR (methylene THF reductase) deficiency and homocystinuria ([facility] MTHFR mutation ([facility] Nasal septal deviation, NSTEMI (non-ST elevated myocardial infarction) ([facility] Preoperative clearance, Prolonged emergence from general anesthesia, Sleep apnea, and Unspecified diseases of blood and blood-forming organs. Past Surgical History:  has a past surgical history that includes Hysterectomy;  Cholecystectomy; Ectopic pregnancy surgery; ERCP; Sinus endoscopy (Bilateral, 10/27/2014); Abdomen surgery; ERCP (N/A, 1/4/2020); ERCP (1/4/2020); ERCP (1/4/2020); ERCP (1/4/2020); ERCP (N/A, 2/24/2020); Cardiac surgery (03/16/2020); ERCP (N/A, 7/30/2020); and ERCP (7/30/2020). Social History:  reports that she has never smoked. She has never used smokeless tobacco. She reports previous alcohol use. She reports that she does not use drugs. Family History: family history includes Diabetes in her father and mother; Heart Disease in her mother; High Blood Pressure in her brother and brother; Other in her brother. The patients home medications have been reviewed. Allergies: Latex; Morphine; Other; Statins;  Adhesive tape; and Sulfa antibiotics    -------------------------------------------------- RESULTS -------------------------------------------------  Labs:  Results for orders placed or performed during the hospital encounter of 08/12/20   CBC Auto Differential   Result Value Ref Range    WBC 6.9 4.5 - 11.5 E9/L    RBC 4.34 3.50 - 5.50 E12/L    Hemoglobin 12.3 11.5 - 15.5 g/dL    Hematocrit 35.9 34.0 - 48.0 %    MCV 82.7 80.0 - 99.9 fL    MCH 28.3 26.0 - 35.0 pg    MCHC 34.3 32.0 - 34.5 %    RDW 12.8 11.5 - 15.0 fL    Platelets 747 850 - 321 E9/L    MPV 10.4 7.0 - 12.0 fL    Neutrophils % 69.4 43.0 - 80.0 %    Immature Granulocytes % 0.1 0.0 - 5.0 %    Lymphocytes % 19.9 (L) 20.0 - 42.0 %    Monocytes % 8.5 2.0 - 12.0 %    Eosinophils % 1.4 0.0 - 6.0 %    Basophils % 0.7 0.0 - 2.0 %    Neutrophils Absolute 4.80 1.80 - 7.30 E9/L    Immature Granulocytes # 0.01 E9/L    Lymphocytes Absolute 1.38 (L) 1.50 - 4.00 E9/L    Monocytes Absolute 0.59 0.10 - 0.95 E9/L    Eosinophils Absolute 0.10 0.05 - 0.50 E9/L    Basophils Absolute 0.05 0.00 - 0.20 E9/L   Comprehensive metabolic panel   Result Value Ref Range    Sodium 135 132 - 146 mmol/L    Potassium 3.3 (L) 3.5 - 5.0 mmol/L    Chloride 97 (L) 98 - 107 mmol/L    CO2 20 (L) 22 - 29 mmol/L    Anion Gap 18 (H) 7 - 16 mmol/L    Glucose 140 (H) 74 - 99 mg/dL    BUN 21 (H) 6 - 20 mg/dL    CREATININE 1.5 (H) 0.5 - 1.0 mg/dL    GFR Non-African American 37 >=60 mL/min/1.73    GFR African American 44     Calcium 8.3 (L) 8.6 - 10.2 mg/dL    Total Protein 6.7 6.4 - 8.3 g/dL    Alb 3.7 3.5 - 5.2 g/dL    Total Bilirubin 0.5 0.0 - 1.2 mg/dL    Alkaline Phosphatase 84 35 - 104 U/L    ALT 14 0 - 32 U/L    AST 22 0 - 31 U/L   Lipase   Result Value Ref Range    Lipase 50 13 - 60 U/L   Troponin   Result Value Ref Range    Troponin <0.01 0.00 - 0.03 ng/mL   Urinalysis   Result Value Ref Range    Color, UA Yellow Straw/Yellow    Clarity, UA Clear Clear    Glucose, Ur Negative Negative mg/dL    Bilirubin Urine Negative Negative    Ketones, Urine TRACE (A) Negative mg/dL    Specific Gravity, UA 1.025 1.005 - 1.030    Blood, Urine TRACE (A) Negative    pH, UA 5.5 5.0 - 9.0    Protein, UA Negative Negative mg/dL    Urobilinogen, Urine 0.2 <2.0 E.U./dL    Nitrite, Urine Negative Negative    Leukocyte Esterase, Urine Negative Negative   Microscopic Urinalysis   Result Value Ref Range    Hyaline Casts, UA 0-2 0 - 2 /LPF    WBC, UA NONE 0 - 5 /HPF    RBC, UA NONE 0 - 2 /HPF    Epithelial Cells, UA RARE /HPF    Bacteria, UA FEW (A) None Seen /HPF   EKG 12 Lead   Result Value Ref Range    Ventricular Rate 95 BPM    Atrial Rate 95 BPM    P-R Interval 128 ms    QRS Duration 90 ms    Q-T Interval 378 ms    QTc Calculation (Bazett) 475 ms    P Axis 15 degrees    R Axis -26 degrees    T Axis 23 degrees       Radiology:  XR CHEST PORTABLE   Final Result   No acute cardiopulmonary disease.             ------------------------- NURSING NOTES AND VITALS REVIEWED ---------------------------  Date / Time Roomed:  8/12/2020  7:48 PM  ED Bed Assignment:  13/13    The nursing notes within the ED encounter and vital signs as below have been reviewed.    /80   Pulse 103   Temp 98.8 °F (37.1 °C) (Temporal)   Resp 16   Ht 5' 3\" (1.6

## 2020-08-14 ENCOUNTER — HOSPITAL ENCOUNTER (EMERGENCY)
Age: 50
Discharge: HOME | End: 2020-08-15
Attending: EMERGENCY MEDICINE
Payer: COMMERCIAL

## 2020-08-14 PROCEDURE — 96375 TX/PRO/DX INJ NEW DRUG ADDON: CPT

## 2020-08-14 PROCEDURE — 83605 ASSAY OF LACTIC ACID: CPT

## 2020-08-14 PROCEDURE — 96374 THER/PROPH/DIAG INJ IV PUSH: CPT

## 2020-08-14 PROCEDURE — 36415 COLL VENOUS BLD VENIPUNCTURE: CPT

## 2020-08-14 PROCEDURE — 84484 ASSAY OF TROPONIN QUANT: CPT

## 2020-08-14 PROCEDURE — 96367 TX/PROPH/DG ADDL SEQ IV INF: CPT

## 2020-08-14 PROCEDURE — 96368 THER/DIAG CONCURRENT INF: CPT

## 2020-08-14 PROCEDURE — 83735 ASSAY OF MAGNESIUM: CPT

## 2020-08-14 PROCEDURE — 85025 COMPLETE CBC W/AUTO DIFF WBC: CPT

## 2020-08-14 PROCEDURE — 96361 HYDRATE IV INFUSION ADD-ON: CPT

## 2020-08-14 PROCEDURE — 80053 COMPREHEN METABOLIC PANEL: CPT

## 2020-08-14 PROCEDURE — 96366 THER/PROPH/DIAG IV INF ADDON: CPT

## 2020-08-14 PROCEDURE — 83690 ASSAY OF LIPASE: CPT

## 2020-08-14 PROCEDURE — 96365 THER/PROPH/DIAG IV INF INIT: CPT

## 2020-08-14 PROCEDURE — 93005 ELECTROCARDIOGRAM TRACING: CPT | Performed by: EMERGENCY MEDICINE

## 2020-08-14 PROCEDURE — 99285 EMERGENCY DEPT VISIT HI MDM: CPT

## 2020-08-14 RX ORDER — FENTANYL CITRATE 50 UG/ML
50 INJECTION, SOLUTION INTRAMUSCULAR; INTRAVENOUS ONCE
Status: COMPLETED | OUTPATIENT
Start: 2020-08-15 | End: 2020-08-15

## 2020-08-14 RX ORDER — ONDANSETRON 2 MG/ML
4 INJECTION INTRAMUSCULAR; INTRAVENOUS ONCE
Status: COMPLETED | OUTPATIENT
Start: 2020-08-15 | End: 2020-08-15

## 2020-08-14 RX ORDER — 0.9 % SODIUM CHLORIDE 0.9 %
1000 INTRAVENOUS SOLUTION INTRAVENOUS ONCE
Status: COMPLETED | OUTPATIENT
Start: 2020-08-15 | End: 2020-08-15

## 2020-08-15 ENCOUNTER — APPOINTMENT (OUTPATIENT)
Dept: CT IMAGING | Age: 50
End: 2020-08-15
Payer: COMMERCIAL

## 2020-08-15 VITALS
OXYGEN SATURATION: 99 % | HEART RATE: 95 BPM | DIASTOLIC BLOOD PRESSURE: 80 MMHG | BODY MASS INDEX: 38.97 KG/M2 | TEMPERATURE: 98.2 F | WEIGHT: 220 LBS | RESPIRATION RATE: 26 BRPM | SYSTOLIC BLOOD PRESSURE: 125 MMHG

## 2020-08-15 LAB
ALBUMIN SERPL-MCNC: 3.9 G/DL (ref 3.5–5.2)
ALP BLD-CCNC: 89 U/L (ref 35–104)
ALT SERPL-CCNC: 14 U/L (ref 0–32)
ANION GAP SERPL CALCULATED.3IONS-SCNC: 19 MMOL/L (ref 7–16)
AST SERPL-CCNC: 18 U/L (ref 0–31)
BASOPHILS ABSOLUTE: 0.05 E9/L (ref 0–0.2)
BASOPHILS RELATIVE PERCENT: 0.8 % (ref 0–2)
BILIRUB SERPL-MCNC: 0.5 MG/DL (ref 0–1.2)
BILIRUBIN URINE: NEGATIVE
BLOOD, URINE: NEGATIVE
BUN BLDV-MCNC: 11 MG/DL (ref 6–20)
CALCIUM SERPL-MCNC: 7.9 MG/DL (ref 8.6–10.2)
CHLORIDE BLD-SCNC: 100 MMOL/L (ref 98–107)
CLARITY: CLEAR
CO2: 19 MMOL/L (ref 22–29)
COLOR: YELLOW
CREAT SERPL-MCNC: 1.1 MG/DL (ref 0.5–1)
EKG ATRIAL RATE: 89 BPM
EKG P AXIS: 4 DEGREES
EKG P-R INTERVAL: 120 MS
EKG Q-T INTERVAL: 394 MS
EKG QRS DURATION: 88 MS
EKG QTC CALCULATION (BAZETT): 479 MS
EKG R AXIS: -27 DEGREES
EKG T AXIS: 11 DEGREES
EKG VENTRICULAR RATE: 89 BPM
EOSINOPHILS ABSOLUTE: 0.13 E9/L (ref 0.05–0.5)
EOSINOPHILS RELATIVE PERCENT: 2.1 % (ref 0–6)
GFR AFRICAN AMERICAN: >60
GFR NON-AFRICAN AMERICAN: 52 ML/MIN/1.73
GLUCOSE BLD-MCNC: 220 MG/DL (ref 74–99)
GLUCOSE URINE: NEGATIVE MG/DL
HCT VFR BLD CALC: 36.3 % (ref 34–48)
HEMOGLOBIN: 12.6 G/DL (ref 11.5–15.5)
IMMATURE GRANULOCYTES #: 0.02 E9/L
IMMATURE GRANULOCYTES %: 0.3 % (ref 0–5)
KETONES, URINE: ABNORMAL MG/DL
LACTIC ACID: 1.7 MMOL/L (ref 0.5–2.2)
LACTIC ACID: 3.1 MMOL/L (ref 0.5–2.2)
LACTIC ACID: 4.8 MMOL/L (ref 0.5–2.2)
LEUKOCYTE ESTERASE, URINE: NEGATIVE
LIPASE: 75 U/L (ref 13–60)
LYMPHOCYTES ABSOLUTE: 1.19 E9/L (ref 1.5–4)
LYMPHOCYTES RELATIVE PERCENT: 19.6 % (ref 20–42)
MAGNESIUM: 0.7 MG/DL (ref 1.6–2.6)
MCH RBC QN AUTO: 28.4 PG (ref 26–35)
MCHC RBC AUTO-ENTMCNC: 34.7 % (ref 32–34.5)
MCV RBC AUTO: 81.9 FL (ref 80–99.9)
MONOCYTES ABSOLUTE: 0.49 E9/L (ref 0.1–0.95)
MONOCYTES RELATIVE PERCENT: 8.1 % (ref 2–12)
NEUTROPHILS ABSOLUTE: 4.18 E9/L (ref 1.8–7.3)
NEUTROPHILS RELATIVE PERCENT: 69.1 % (ref 43–80)
NITRITE, URINE: NEGATIVE
PDW BLD-RTO: 12.8 FL (ref 11.5–15)
PH UA: 5.5 (ref 5–9)
PLATELET # BLD: 255 E9/L (ref 130–450)
PMV BLD AUTO: 10.1 FL (ref 7–12)
POTASSIUM REFLEX MAGNESIUM: 3 MMOL/L (ref 3.5–5)
PROTEIN UA: NEGATIVE MG/DL
RBC # BLD: 4.43 E12/L (ref 3.5–5.5)
SODIUM BLD-SCNC: 138 MMOL/L (ref 132–146)
SPECIFIC GRAVITY UA: 1.01 (ref 1–1.03)
TOTAL PROTEIN: 6.9 G/DL (ref 6.4–8.3)
TROPONIN: <0.01 NG/ML (ref 0–0.03)
UROBILINOGEN, URINE: 0.2 E.U./DL
WBC # BLD: 6.1 E9/L (ref 4.5–11.5)

## 2020-08-15 PROCEDURE — 6360000002 HC RX W HCPCS: Performed by: EMERGENCY MEDICINE

## 2020-08-15 PROCEDURE — 6360000004 HC RX CONTRAST MEDICATION: Performed by: RADIOLOGY

## 2020-08-15 PROCEDURE — 81003 URINALYSIS AUTO W/O SCOPE: CPT

## 2020-08-15 PROCEDURE — 96375 TX/PRO/DX INJ NEW DRUG ADDON: CPT

## 2020-08-15 PROCEDURE — 6370000000 HC RX 637 (ALT 250 FOR IP): Performed by: EMERGENCY MEDICINE

## 2020-08-15 PROCEDURE — 96368 THER/DIAG CONCURRENT INF: CPT

## 2020-08-15 PROCEDURE — 83605 ASSAY OF LACTIC ACID: CPT

## 2020-08-15 PROCEDURE — 74177 CT ABD & PELVIS W/CONTRAST: CPT

## 2020-08-15 PROCEDURE — 96366 THER/PROPH/DIAG IV INF ADDON: CPT

## 2020-08-15 PROCEDURE — 96361 HYDRATE IV INFUSION ADD-ON: CPT

## 2020-08-15 PROCEDURE — 96365 THER/PROPH/DIAG IV INF INIT: CPT

## 2020-08-15 PROCEDURE — 2580000003 HC RX 258: Performed by: EMERGENCY MEDICINE

## 2020-08-15 RX ORDER — MAGNESIUM SULFATE IN WATER 40 MG/ML
2 INJECTION, SOLUTION INTRAVENOUS ONCE
Status: COMPLETED | OUTPATIENT
Start: 2020-08-15 | End: 2020-08-15

## 2020-08-15 RX ORDER — 0.9 % SODIUM CHLORIDE 0.9 %
1000 INTRAVENOUS SOLUTION INTRAVENOUS ONCE
Status: COMPLETED | OUTPATIENT
Start: 2020-08-15 | End: 2020-08-15

## 2020-08-15 RX ORDER — POTASSIUM CHLORIDE 7.45 MG/ML
10 INJECTION INTRAVENOUS ONCE
Status: COMPLETED | OUTPATIENT
Start: 2020-08-15 | End: 2020-08-15

## 2020-08-15 RX ORDER — POTASSIUM CHLORIDE 20 MEQ/1
40 TABLET, EXTENDED RELEASE ORAL ONCE
Status: COMPLETED | OUTPATIENT
Start: 2020-08-15 | End: 2020-08-15

## 2020-08-15 RX ADMIN — POTASSIUM CHLORIDE 10 MEQ: 7.46 INJECTION, SOLUTION INTRAVENOUS at 01:25

## 2020-08-15 RX ADMIN — IOPAMIDOL 75 ML: 755 INJECTION, SOLUTION INTRAVENOUS at 02:11

## 2020-08-15 RX ADMIN — ONDANSETRON 4 MG: 2 INJECTION INTRAMUSCULAR; INTRAVENOUS at 00:08

## 2020-08-15 RX ADMIN — MAGNESIUM SULFATE HEPTAHYDRATE 2 G: 40 INJECTION, SOLUTION INTRAVENOUS at 01:12

## 2020-08-15 RX ADMIN — POTASSIUM CHLORIDE 40 MEQ: 1500 TABLET, EXTENDED RELEASE ORAL at 01:16

## 2020-08-15 RX ADMIN — SODIUM CHLORIDE 1000 ML: 9 INJECTION, SOLUTION INTRAVENOUS at 03:11

## 2020-08-15 RX ADMIN — SODIUM CHLORIDE 1000 ML: 9 INJECTION, SOLUTION INTRAVENOUS at 00:06

## 2020-08-15 RX ADMIN — FENTANYL CITRATE 50 MCG: 50 INJECTION, SOLUTION INTRAMUSCULAR; INTRAVENOUS at 00:10

## 2020-08-15 ASSESSMENT — ENCOUNTER SYMPTOMS
FACIAL SWELLING: 0
SHORTNESS OF BREATH: 0
DIARRHEA: 0
NAUSEA: 1
CONSTIPATION: 0
COUGH: 0
RHINORRHEA: 0
CHEST TIGHTNESS: 0
PHOTOPHOBIA: 0
EYE REDNESS: 0
EYE PAIN: 0
ABDOMINAL DISTENTION: 0
ABDOMINAL PAIN: 1
COLOR CHANGE: 0

## 2020-08-15 ASSESSMENT — PAIN SCALES - GENERAL
PAINLEVEL_OUTOF10: 3
PAINLEVEL_OUTOF10: 8

## 2020-08-15 ASSESSMENT — PAIN DESCRIPTION - PROGRESSION: CLINICAL_PROGRESSION: GRADUALLY IMPROVING

## 2020-08-15 ASSESSMENT — PAIN DESCRIPTION - PAIN TYPE: TYPE: ACUTE PAIN

## 2020-08-15 ASSESSMENT — PAIN DESCRIPTION - DESCRIPTORS: DESCRIPTORS: ACHING

## 2020-08-15 ASSESSMENT — PAIN DESCRIPTION - FREQUENCY: FREQUENCY: INTERMITTENT

## 2020-08-15 ASSESSMENT — PAIN DESCRIPTION - LOCATION: LOCATION: ABDOMEN

## 2020-08-15 NOTE — ED PROVIDER NOTES
Patient is a 63-year-old female presenting to the emergency department with epigastric abdominal pain that began approximate 1 hour prior to arrival.  She also states that she has a sense of numbness and tingling that is body wide. She has no decreased sensation or weakness or unilateral weakness. Patient states when he began burning sharp pain in her epigastric region has not had this pain before has some slight nausea with it but no vomiting. She denies any diarrhea. States she does have a history of homocystinuria and does not member last time she got her folate and B12 levels checked. Symptoms are worsened by nothing       Symptoms are improved by nothing    Denies any associated vomiting, diarrhea, rashes    Review of Systems   Constitutional: Negative for activity change, chills, diaphoresis and fatigue. HENT: Negative for congestion, facial swelling, hearing loss and rhinorrhea. Eyes: Negative for photophobia, pain and redness. Respiratory: Negative for cough, chest tightness and shortness of breath. Cardiovascular: Negative for chest pain, palpitations and leg swelling. Gastrointestinal: Positive for abdominal pain and nausea. Negative for abdominal distention, constipation and diarrhea. Genitourinary: Negative for difficulty urinating, dysuria, frequency and hematuria. Musculoskeletal: Negative for arthralgias, joint swelling and myalgias. Skin: Negative for color change, pallor and rash. Neurological: Negative for light-headedness, numbness and headaches. Hematological: Negative for adenopathy. Physical Exam  Vitals signs and nursing note reviewed. Constitutional:       General: She is not in acute distress. Appearance: She is well-developed. She is obese. She is not ill-appearing. HENT:      Head: Normocephalic and atraumatic. Right Ear: Tympanic membrane normal.      Left Ear: Tympanic membrane normal.      Nose: Nose normal. No congestion. Mouth/Throat:      Mouth: Mucous membranes are moist.      Pharynx: Oropharynx is clear. Eyes:      Pupils: Pupils are equal, round, and reactive to light. Neck:      Musculoskeletal: Normal range of motion and neck supple. No neck rigidity. Cardiovascular:      Rate and Rhythm: Normal rate and regular rhythm. Pulses: Normal pulses. Heart sounds: No murmur. No friction rub. No gallop. Pulmonary:      Effort: Pulmonary effort is normal. No respiratory distress. Breath sounds: Normal breath sounds. No wheezing or rales. Abdominal:      General: Bowel sounds are normal.      Palpations: Abdomen is soft. Tenderness: There is abdominal tenderness (Epigastric). There is no guarding or rebound. Skin:     General: Skin is warm and dry. Neurological:      Mental Status: She is alert and oriented to person, place, and time. Cranial Nerves: No cranial nerve deficit. Coordination: Coordination normal.          Procedures     EKG: This EKG is signed and interpreted by me. Rate: 89  Rhythm: Sinus  Interpretation: no acute changes  Comparison: stable as compared to patient's most recent EKG       MDM       ED Course as of Aug 15 0444   Sat Aug 15, 2020   0124 Patient was found to have a low potassium and low magnesium. She will be given IV and oral potassium as well as IV magnesium replenishment. [DM]      ED Course User Index  [DM] Janice Sloan DO        Time: 9308. Re-evaluation. Patients symptoms are improving  Repeat physical examination is improved  Patient is feeling much better. ED Course as of Aug 15 0444   Sat Aug 15, 2020   0124 Patient was found to have a low potassium and low magnesium. She will be given IV and oral potassium as well as IV magnesium replenishment.     [DM]      ED Course User Index  [DM] Janice Sloan, DO       --------------------------------------------- PAST HISTORY ---------------------------------------------  Past Medical History: Neutrophils % 69.1 43.0 - 80.0 %    Immature Granulocytes % 0.3 0.0 - 5.0 %    Lymphocytes % 19.6 (L) 20.0 - 42.0 %    Monocytes % 8.1 2.0 - 12.0 %    Eosinophils % 2.1 0.0 - 6.0 %    Basophils % 0.8 0.0 - 2.0 %    Neutrophils Absolute 4.18 1.80 - 7.30 E9/L    Immature Granulocytes # 0.02 E9/L    Lymphocytes Absolute 1.19 (L) 1.50 - 4.00 E9/L    Monocytes Absolute 0.49 0.10 - 0.95 E9/L    Eosinophils Absolute 0.13 0.05 - 0.50 E9/L    Basophils Absolute 0.05 0.00 - 0.20 E9/L   Comprehensive Metabolic Panel w/ Reflex to MG   Result Value Ref Range    Sodium 138 132 - 146 mmol/L    Potassium reflex Magnesium 3.0 (L) 3.5 - 5.0 mmol/L    Chloride 100 98 - 107 mmol/L    CO2 19 (L) 22 - 29 mmol/L    Anion Gap 19 (H) 7 - 16 mmol/L    Glucose 220 (H) 74 - 99 mg/dL    BUN 11 6 - 20 mg/dL    CREATININE 1.1 (H) 0.5 - 1.0 mg/dL    GFR Non-African American 52 >=60 mL/min/1.73    GFR African American >60     Calcium 7.9 (L) 8.6 - 10.2 mg/dL    Total Protein 6.9 6.4 - 8.3 g/dL    Alb 3.9 3.5 - 5.2 g/dL    Total Bilirubin 0.5 0.0 - 1.2 mg/dL    Alkaline Phosphatase 89 35 - 104 U/L    ALT 14 0 - 32 U/L    AST 18 0 - 31 U/L   Lipase   Result Value Ref Range    Lipase 75 (H) 13 - 60 U/L   Troponin   Result Value Ref Range    Troponin <0.01 0.00 - 0.03 ng/mL   Urinalysis, reflex to microscopic   Result Value Ref Range    Color, UA Yellow Straw/Yellow    Clarity, UA Clear Clear    Glucose, Ur Negative Negative mg/dL    Bilirubin Urine Negative Negative    Ketones, Urine TRACE (A) Negative mg/dL    Specific Gravity, UA 1.010 1.005 - 1.030    Blood, Urine Negative Negative    pH, UA 5.5 5.0 - 9.0    Protein, UA Negative Negative mg/dL    Urobilinogen, Urine 0.2 <2.0 E.U./dL    Nitrite, Urine Negative Negative    Leukocyte Esterase, Urine Negative Negative   Lactic Acid, Plasma   Result Value Ref Range    Lactic Acid 4.8 (HH) 0.5 - 2.2 mmol/L   Magnesium   Result Value Ref Range    Magnesium 0.7 (LL) 1.6 - 2.6 mg/dL   Lactic Acid, Plasma Result Value Ref Range    Lactic Acid 3.1 (H) 0.5 - 2.2 mmol/L   Lactic Acid, Plasma   Result Value Ref Range    Lactic Acid 1.7 0.5 - 2.2 mmol/L       Radiology:  CT ABDOMEN PELVIS W IV CONTRAST Additional Contrast? None   Final Result   No acute findings. ------------------------- NURSING NOTES AND VITALS REVIEWED ---------------------------  Date / Time Roomed:  8/14/2020 11:35 PM  ED Bed Assignment:  06/06    The nursing notes within the ED encounter and vital signs as below have been reviewed. /80   Pulse 95   Temp 98.2 °F (36.8 °C) (Oral)   Resp 26   Wt 220 lb (99.8 kg)   SpO2 99%   BMI 38.97 kg/m²   Oxygen Saturation Interpretation: Normal      ------------------------------------------ PROGRESS NOTES ------------------------------------------  I have spoken with the patient and discussed todays results, in addition to providing specific details for the plan of care and counseling regarding the diagnosis and prognosis. Their questions are answered at this time and they are agreeable with the plan. I discussed at length with them reasons for immediate return here for re evaluation. They will followup with primary care by calling their office tomorrow. --------------------------------- ADDITIONAL PROVIDER NOTES ---------------------------------  At this time the patient is without objective evidence of an acute process requiring hospitalization or inpatient management. They have remained hemodynamically stable throughout their entire ED visit and are stable for discharge with outpatient follow-up. The plan has been discussed in detail and they are aware of the specific conditions for emergent return, as well as the importance of follow-up. New Prescriptions    No medications on file       Diagnosis:  1. Paresthesias    2. Dizziness    3. Dehydration    4. Diarrhea, unspecified type    5. Hypokalemia    6.  Hypomagnesemia        Disposition:  Patient's disposition: Discharge to home  Patient's condition is stable. ATTENDING PROVIDER ATTESTATION:     I have personally performed and/or participated in the history, exam, medical decision making, and procedures and agree with all pertinent clinical information. I have also reviewed and agree with the past medical, family and social history unless otherwise noted. I have discussed this patient in detail with the resident, and provided the instruction and education regarding bodywide tingling, dizziness, and diarrhea. My findings/Plan: Tachycardic. Lungs CTA bilaterally. Abdomen soft, nontender. Bowel sounds normal. Supportive care. Discharge for outpatient follow up.            [address] [facility],   08/15/20 2956

## 2020-11-09 ENCOUNTER — HOSPITAL ENCOUNTER (EMERGENCY)
Age: 50
Discharge: HOME | End: 2020-11-09
Payer: COMMERCIAL

## 2020-11-09 ENCOUNTER — APPOINTMENT (OUTPATIENT)
Dept: GENERAL RADIOLOGY | Age: 50
End: 2020-11-09
Payer: COMMERCIAL

## 2020-11-09 VITALS
TEMPERATURE: 98.1 F | OXYGEN SATURATION: 97 % | WEIGHT: 220 LBS | HEART RATE: 90 BPM | DIASTOLIC BLOOD PRESSURE: 84 MMHG | BODY MASS INDEX: 38.98 KG/M2 | HEIGHT: 63 IN | RESPIRATION RATE: 16 BRPM | SYSTOLIC BLOOD PRESSURE: 130 MMHG

## 2020-11-09 PROCEDURE — 99212 OFFICE O/P EST SF 10 MIN: CPT

## 2020-11-09 PROCEDURE — 73110 X-RAY EXAM OF WRIST: CPT

## 2020-11-09 RX ORDER — INSULIN GLARGINE 100 [IU]/ML
40 INJECTION, SOLUTION SUBCUTANEOUS NIGHTLY
COMMUNITY

## 2020-11-09 RX ORDER — ATORVASTATIN CALCIUM 20 MG/1
20 TABLET, FILM COATED ORAL
COMMUNITY

## 2020-11-09 RX ORDER — GABAPENTIN 100 MG/1
300 CAPSULE ORAL NIGHTLY
COMMUNITY

## 2020-11-09 ASSESSMENT — PAIN DESCRIPTION - ONSET
ONSET: ON-GOING
ONSET: ON-GOING

## 2020-11-09 ASSESSMENT — PAIN DESCRIPTION - DESCRIPTORS: DESCRIPTORS: BURNING

## 2020-11-09 ASSESSMENT — PAIN - FUNCTIONAL ASSESSMENT
PAIN_FUNCTIONAL_ASSESSMENT: PREVENTS OR INTERFERES SOME ACTIVE ACTIVITIES AND ADLS
PAIN_FUNCTIONAL_ASSESSMENT: PREVENTS OR INTERFERES SOME ACTIVE ACTIVITIES AND ADLS
PAIN_FUNCTIONAL_ASSESSMENT: 0-10

## 2020-11-09 ASSESSMENT — PAIN DESCRIPTION - PAIN TYPE
TYPE: ACUTE PAIN
TYPE: ACUTE PAIN

## 2020-11-09 ASSESSMENT — PAIN DESCRIPTION - FREQUENCY
FREQUENCY: CONTINUOUS
FREQUENCY: CONTINUOUS

## 2020-11-09 ASSESSMENT — PAIN DESCRIPTION - ORIENTATION
ORIENTATION: RIGHT
ORIENTATION: RIGHT

## 2020-11-09 ASSESSMENT — PAIN DESCRIPTION - LOCATION
LOCATION: ARM
LOCATION: ARM;HAND

## 2020-11-09 ASSESSMENT — PAIN SCALES - GENERAL: PAINLEVEL_OUTOF10: 2

## 2020-11-09 ASSESSMENT — PAIN DESCRIPTION - PROGRESSION
CLINICAL_PROGRESSION: GRADUALLY IMPROVING
CLINICAL_PROGRESSION: GRADUALLY WORSENING

## 2020-11-09 NOTE — ED PROVIDER NOTES
Department of Emergency Medicine   [facility]  Provider Note  Admit Date/RoomTime: 11/9/2020  2:39 PM  Room: 04/04  Chief Complaint:   Fall (Pt states \"I fell about a 1/2 Hr ago @ Home & I was in a hurry & I'm having BR remodeled and there was a box sitting out & tripped over it. I went face first landing on My knees & My Right arm went into the Dry Wall & 2x4  & Now I have a knot on my right Wrist\" )    History of Present Illness   Source of history provided by:  patient. History/Exam Limitations: none. Cj Samuel is a 48 y.o. old female with a past medical history of:   Past Medical History:   Diagnosis Date    Back pain     CAD in native artery 3/16/2020    Depression with anxiety     Diabetes mellitus ([address]     Fatty liver     Gallstones     GERD (gastroesophageal reflux disease)     Hiatal hernia     Hyperlipidemia     Hypertension     Metabolic disorder     MTHFR (methylene THF reductase) deficiency and homocystinuria ([facility])     [facility] mutation ([facility]     Nasal septal deviation     procedure 10/27/2014    NSTEMI (non-ST elevated myocardial infarction) ([address]     Preoperative clearance 10/20/2014    medical-Dr. Anand Campos; paper copy on chart for procedure 10/27/2014    Prolonged emergence from general anesthesia     Sleep apnea     cpap    Unspecified diseases of blood and blood-forming organs     presents to the urgent care center by private vehicle, for right wrist pain. She fell and her arm went into the wall. She is having pain over the wrist. She landed on her knees, they are getting better, she can ambulate without difficulty. This happened 30 minutes prior to arrival.     ROS   Pertinent positives and negatives are stated within HPI, all other systems reviewed and are negative.     Past Surgical History:   Procedure Laterality Date    ABDOMEN SURGERY      CARDIAC SURGERY  03/16/2020    cardiac catheterization/stent    CHOLECYSTECTOMY      deficit: none. Sensory deficit: none. Pulse deficit: none. Capillary refill: normal.  · Lymphatics: No lymphangitis or adenopathy noted. · Neurological:  Oriented. Motor functions intact. Lab / Imaging Results   (All laboratory and radiology results have been personally reviewed by myself)  Labs:  No results found for this visit on 11/09/20. Imaging: All Radiology results interpreted by Radiologist unless otherwise noted. XR WRIST RIGHT (MIN 3 VIEWS)   Final Result   Normal wrist radiographs           ED Course / Medical Decision Making   Medications - No data to display         MDM:        Patient here complaining of wrist pain she fell in her wrist and into the wall she landed on her knees but she feels okay she can walk without difficulty and it is not painful. The x-ray was negative. I advised her to take Tylenol ibuprofen as needed ice for 10 minutes at a time every 2-3 hours she was placed in an Ace wrap. If she  does not improve she needs to follow-up with her doctor  Counseling:   I have  spoken with the patient and discussed todays results, in addition to providing specific details for the plan of care and counseling regarding the diagnosis and prognosis. Questions are answered at this time and they are agreeable with the plan. Assessment      1. Bruise    2. Sprain of wrist, unspecified laterality, initial encounter      Plan   Discharge to home and advised to contact Dell Mcintosh, [address] [place] [address] [address]  [phone]    Schedule an appointment as soon as possible for a visit      Patient condition is good    New Medications     New Prescriptions    No medications on file     Electronically signed by VELASQUEZ Husain CNP   DD: 11/9/20  **This report was transcribed using voice recognition software. Every effort was made to ensure accuracy; however, inadvertent computerized transcription errors may be present.   END OF ED PROVIDER NOTE     Figueroa Frederick, VELASQUEZ - CNP  11/09/20 0912

## 2021-04-22 ENCOUNTER — HOSPITAL ENCOUNTER (OUTPATIENT)
Age: 51
Discharge: HOME | End: 2021-04-22
Payer: COMMERCIAL

## 2021-04-22 LAB
ALBUMIN SERPL-MCNC: 4.3 G/DL (ref 3.5–5.2)
ALP BLD-CCNC: 94 U/L (ref 35–104)
ALT SERPL-CCNC: 15 U/L (ref 0–32)
ANION GAP SERPL CALCULATED.3IONS-SCNC: 15 MMOL/L (ref 7–16)
AST SERPL-CCNC: 15 U/L (ref 0–31)
BACTERIA: ABNORMAL /HPF
BASOPHILS ABSOLUTE: 0.04 E9/L (ref 0–0.2)
BASOPHILS RELATIVE PERCENT: 0.6 % (ref 0–2)
BILIRUB SERPL-MCNC: 0.6 MG/DL (ref 0–1.2)
BILIRUBIN URINE: ABNORMAL
BLOOD, URINE: NEGATIVE
BUN BLDV-MCNC: 11 MG/DL (ref 6–20)
CALCIUM SERPL-MCNC: 9.4 MG/DL (ref 8.6–10.2)
CHLORIDE BLD-SCNC: 99 MMOL/L (ref 98–107)
CHOLESTEROL, FASTING: 231 MG/DL (ref 0–199)
CLARITY: CLEAR
CO2: 25 MMOL/L (ref 22–29)
COLOR: YELLOW
CREAT SERPL-MCNC: 0.9 MG/DL (ref 0.5–1)
EOSINOPHILS ABSOLUTE: 0.2 E9/L (ref 0.05–0.5)
EOSINOPHILS RELATIVE PERCENT: 2.8 % (ref 0–6)
EPITHELIAL CELLS, UA: ABNORMAL /HPF
GFR AFRICAN AMERICAN: >60
GFR NON-AFRICAN AMERICAN: >60 ML/MIN/1.73
GLUCOSE FASTING: 218 MG/DL (ref 74–99)
GLUCOSE URINE: NEGATIVE MG/DL
HBA1C MFR BLD: 9 % (ref 4–5.6)
HCT VFR BLD CALC: 39.3 % (ref 34–48)
HDLC SERPL-MCNC: 42 MG/DL
HEMOGLOBIN: 12.9 G/DL (ref 11.5–15.5)
IMMATURE GRANULOCYTES #: 0.02 E9/L
IMMATURE GRANULOCYTES %: 0.3 % (ref 0–5)
IRON: 72 MCG/DL (ref 37–145)
KETONES, URINE: ABNORMAL MG/DL
LDL CHOLESTEROL CALCULATED: ABNORMAL MG/DL (ref 0–99)
LEUKOCYTE ESTERASE, URINE: NEGATIVE
LYMPHOCYTES ABSOLUTE: 2.05 E9/L (ref 1.5–4)
LYMPHOCYTES RELATIVE PERCENT: 28.9 % (ref 20–42)
MCH RBC QN AUTO: 28.2 PG (ref 26–35)
MCHC RBC AUTO-ENTMCNC: 32.8 % (ref 32–34.5)
MCV RBC AUTO: 85.8 FL (ref 80–99.9)
MICROALBUMIN UR-MCNC: 72.8 MG/L
MONOCYTES ABSOLUTE: 0.33 E9/L (ref 0.1–0.95)
MONOCYTES RELATIVE PERCENT: 4.6 % (ref 2–12)
NEUTROPHILS ABSOLUTE: 4.46 E9/L (ref 1.8–7.3)
NEUTROPHILS RELATIVE PERCENT: 62.8 % (ref 43–80)
NITRITE, URINE: NEGATIVE
PDW BLD-RTO: 12.8 FL (ref 11.5–15)
PH UA: 5.5 (ref 5–9)
PLATELET # BLD: 342 E9/L (ref 130–450)
PMV BLD AUTO: 10 FL (ref 7–12)
POTASSIUM SERPL-SCNC: 4 MMOL/L (ref 3.5–5)
PROTEIN UA: 30 MG/DL
RBC # BLD: 4.58 E12/L (ref 3.5–5.5)
RBC UA: ABNORMAL /HPF (ref 0–2)
SODIUM BLD-SCNC: 139 MMOL/L (ref 132–146)
SPECIFIC GRAVITY UA: >=1.03 (ref 1–1.03)
TOTAL PROTEIN: 7.1 G/DL (ref 6.4–8.3)
TRIGLYCERIDE, FASTING: 477 MG/DL (ref 0–149)
TSH SERPL DL<=0.05 MIU/L-ACNC: 2.1 UIU/ML (ref 0.27–4.2)
UROBILINOGEN, URINE: 0.2 E.U./DL
VITAMIN D 25-HYDROXY: 16 NG/ML (ref 30–100)
VLDLC SERPL CALC-MCNC: ABNORMAL MG/DL
WBC # BLD: 7.1 E9/L (ref 4.5–11.5)
WBC UA: ABNORMAL /HPF (ref 0–5)

## 2021-04-22 PROCEDURE — 82607 VITAMIN B-12: CPT

## 2021-04-22 PROCEDURE — 82746 ASSAY OF FOLIC ACID SERUM: CPT

## 2021-04-22 PROCEDURE — 83540 ASSAY OF IRON: CPT

## 2021-04-22 PROCEDURE — 83036 HEMOGLOBIN GLYCOSYLATED A1C: CPT

## 2021-04-22 PROCEDURE — 36415 COLL VENOUS BLD VENIPUNCTURE: CPT

## 2021-04-22 PROCEDURE — 84443 ASSAY THYROID STIM HORMONE: CPT

## 2021-04-22 PROCEDURE — 81001 URINALYSIS AUTO W/SCOPE: CPT

## 2021-04-22 PROCEDURE — 82044 UR ALBUMIN SEMIQUANTITATIVE: CPT

## 2021-04-22 PROCEDURE — 82306 VITAMIN D 25 HYDROXY: CPT

## 2021-04-22 PROCEDURE — 80061 LIPID PANEL: CPT

## 2021-04-22 PROCEDURE — 85025 COMPLETE CBC W/AUTO DIFF WBC: CPT

## 2021-04-22 PROCEDURE — 80053 COMPREHEN METABOLIC PANEL: CPT

## 2021-04-23 LAB
FOLATE: 17.6 NG/ML (ref 4.8–24.2)
VITAMIN B-12: 301 PG/ML (ref 211–946)

## 2021-04-28 ENCOUNTER — HOSPITAL ENCOUNTER (OUTPATIENT)
Dept: MAMMOGRAPHY | Age: 51
Discharge: HOME | End: 2021-04-30
Payer: COMMERCIAL

## 2021-04-28 VITALS — WEIGHT: 220 LBS | HEIGHT: 63 IN | BODY MASS INDEX: 38.98 KG/M2

## 2021-04-28 DIAGNOSIS — Z12.31 ENCOUNTER FOR SCREENING MAMMOGRAM FOR MALIGNANT NEOPLASM OF BREAST: ICD-10-CM

## 2021-04-28 DIAGNOSIS — Z12.31 OTHER SCREENING MAMMOGRAM: ICD-10-CM

## 2021-04-28 PROCEDURE — 77067 SCR MAMMO BI INCL CAD: CPT

## 2021-07-06 ENCOUNTER — HOSPITAL ENCOUNTER (EMERGENCY)
Age: 51
Discharge: HOME | End: 2021-07-06
Payer: COMMERCIAL

## 2021-07-06 ENCOUNTER — APPOINTMENT (OUTPATIENT)
Dept: GENERAL RADIOLOGY | Age: 51
End: 2021-07-06
Payer: COMMERCIAL

## 2021-07-06 VITALS
WEIGHT: 224 LBS | OXYGEN SATURATION: 95 % | DIASTOLIC BLOOD PRESSURE: 79 MMHG | RESPIRATION RATE: 20 BRPM | TEMPERATURE: 97.1 F | BODY MASS INDEX: 39.69 KG/M2 | SYSTOLIC BLOOD PRESSURE: 124 MMHG | HEIGHT: 63 IN | HEART RATE: 83 BPM

## 2021-07-06 DIAGNOSIS — M25.561 RIGHT KNEE PAIN, UNSPECIFIED CHRONICITY: Primary | ICD-10-CM

## 2021-07-06 PROCEDURE — 73560 X-RAY EXAM OF KNEE 1 OR 2: CPT

## 2021-07-06 PROCEDURE — 99212 OFFICE O/P EST SF 10 MIN: CPT

## 2021-07-06 RX ORDER — CLOPIDOGREL BISULFATE 75 MG/1
75 TABLET ORAL DAILY
Qty: 90 TABLET | Refills: 3 | Status: SHIPPED | OUTPATIENT
Start: 2021-07-06

## 2021-07-06 ASSESSMENT — PAIN DESCRIPTION - PAIN TYPE: TYPE: ACUTE PAIN

## 2021-07-06 ASSESSMENT — PAIN DESCRIPTION - LOCATION: LOCATION: KNEE

## 2021-07-06 ASSESSMENT — PAIN SCALES - GENERAL: PAINLEVEL_OUTOF10: 4

## 2021-07-06 ASSESSMENT — PAIN DESCRIPTION - ORIENTATION: ORIENTATION: RIGHT

## 2021-07-06 NOTE — ED PROVIDER NOTES
[address] [facility]  Department of Emergency Medicine  UC Encounter Note  21   4:58 PM EDT      NAME: Susan Lau  :  1970  MRN:  [id number]    Chief Complaint: Knee Pain (started  yesterday  with right knee pain  feels like it is going to pop  has had for years but not this bad)      This is a 49-year-old female the presents to urgent care complaining of right knee pain for years. States having some increased pain to the right knee lately. Says the front of it looks more swollen than usual.  But denies any redness. No fevers or chills. Denies any ankle or hip pain. On first contact patient she appears to be in no acute distress. Review of Systems  Pertinent positives and negatives are stated within HPI, all other systems reviewed and are negative. Physical Exam  Vitals and nursing note reviewed. Constitutional:       Appearance: She is well-developed. HENT:      Head: Normocephalic and atraumatic. Eyes:      Pupils: Pupils are equal, round, and reactive to light. Cardiovascular:      Rate and Rhythm: Normal rate and regular rhythm. Heart sounds: Normal heart sounds. No murmur heard. Pulmonary:      Effort: Pulmonary effort is normal. No respiratory distress. Breath sounds: Normal breath sounds. No wheezing or rales. Abdominal:      General: Bowel sounds are normal.      Palpations: Abdomen is soft. Tenderness: There is no abdominal tenderness. There is no guarding or rebound. Musculoskeletal:      Cervical back: Normal range of motion and neck supple. Right hip: Normal.      Right knee: Bony tenderness present. No swelling, deformity, effusion, erythema, ecchymosis or lacerations. Normal range of motion. Tenderness present. No LCL laxity, MCL laxity, ACL laxity or PCL laxity. Right ankle: Normal.   Skin:     General: Skin is warm and dry.    Neurological:      Mental Status: She is alert and oriented to person, place, and time.      Cranial Nerves: No cranial nerve deficit. Coordination: Coordination normal.         Procedures    MDM  Number of Diagnoses or Management Options  Right knee pain, unspecified chronicity  Diagnosis management comments: X-ray was reviewed. Ace wrap. Refer to primary care provider or orthopedic surgeon. May need physical therapy. Voltaren gel prescription.           --------------------------------------------- PAST HISTORY ---------------------------------------------  Past Medical History:  has a past medical history of Back pain, CAD in native artery, Depression with anxiety, Diabetes mellitus ([address] Fatty liver, Gallstones, GERD (gastroesophageal reflux disease), Hiatal hernia, Hyperlipidemia, Hypertension, Metabolic disorder, MTHFR (methylene THF reductase) deficiency and homocystinuria ([address] MTHFR mutation, Nasal septal deviation, NSTEMI (non-ST elevated myocardial infarction) ([address] Preoperative clearance, Prolonged emergence from general anesthesia, Sleep apnea, and Unspecified diseases of blood and blood-forming organs. Past Surgical History:  has a past surgical history that includes Hysterectomy; Cholecystectomy; Ectopic pregnancy surgery; ERCP; Sinus endoscopy (Bilateral, 10/27/2014); Abdomen surgery; ERCP (N/A, 1/4/2020); ERCP (1/4/2020); ERCP (1/4/2020); ERCP (1/4/2020); ERCP (N/A, 2/24/2020); Cardiac surgery (03/16/2020); ERCP (N/A, 7/30/2020); and ERCP (7/30/2020). Social History:  reports that she has never smoked. She has never used smokeless tobacco. She reports previous alcohol use. She reports that she does not use drugs. Family History: family history includes Breast Cancer in her paternal aunt; Diabetes in her father and mother; Heart Disease in her mother; High Blood Pressure in her brother and brother; Other in her brother. The patients home medications have been reviewed.     Allergies: Latex, Morphine, Other, Statins, Adhesive tape, and Sulfa antibiotics    -------------------------------------------------- RESULTS -------------------------------------------------  No results found for this visit on 07/06/21. XR KNEE RIGHT (1-2 VIEWS)   Final Result   No fracture or dislocation. Joint spaces are intact.             ------------------------- NURSING NOTES AND VITALS REVIEWED ---------------------------   The nursing notes within the ED encounter and vital signs as below have been reviewed. /79   Pulse 83   Temp 97.1 °F (36.2 °C) (Infrared)   Resp 20   Ht 5' 3\" (1.6 m)   Wt 224 lb (101.6 kg)   SpO2 95%   BMI 39.68 kg/m²   Oxygen Saturation Interpretation: Normal      ------------------------------------------ PROGRESS NOTES ------------------------------------------   I have spoken with the patient and discussed todays results, in addition to providing specific details for the plan of care and counseling regarding the diagnosis and prognosis. Their questions are answered at this time and they are agreeable with the plan.      --------------------------------- ADDITIONAL PROVIDER NOTES ---------------------------------     This patient is stable for discharge. I have shared the specific conditions for return, as well as the importance of follow-up. * NOTE: This report was transcribed using voice recognition software. Every effort was made to ensure accuracy; however, inadvertent computerized transcription errors may be present.    --------------------------------- IMPRESSION AND DISPOSITION ---------------------------------    IMPRESSION  1.  Right knee pain, unspecified chronicity        DISPOSITION  Disposition: Discharge to home  Patient condition is good        Levon Lewis PA-C  07/06/21 3699

## 2021-09-23 ENCOUNTER — HOSPITAL ENCOUNTER (EMERGENCY)
Age: 51
Discharge: HOME | End: 2021-09-23
Attending: EMERGENCY MEDICINE

## 2021-09-23 ENCOUNTER — APPOINTMENT (OUTPATIENT)
Dept: CT IMAGING | Age: 51
End: 2021-09-23

## 2021-09-23 ENCOUNTER — APPOINTMENT (OUTPATIENT)
Dept: GENERAL RADIOLOGY | Age: 51
End: 2021-09-23

## 2021-09-23 VITALS
DIASTOLIC BLOOD PRESSURE: 78 MMHG | HEIGHT: 63 IN | TEMPERATURE: 96.8 F | RESPIRATION RATE: 14 BRPM | BODY MASS INDEX: 34.55 KG/M2 | SYSTOLIC BLOOD PRESSURE: 126 MMHG | OXYGEN SATURATION: 97 % | WEIGHT: 195 LBS

## 2021-09-23 DIAGNOSIS — Z20.822 ENCOUNTER FOR LABORATORY TESTING FOR COVID-19 VIRUS: ICD-10-CM

## 2021-09-23 DIAGNOSIS — B34.9 VIRAL SYNDROME: Primary | ICD-10-CM

## 2021-09-23 DIAGNOSIS — K76.0 FATTY LIVER: ICD-10-CM

## 2021-09-23 DIAGNOSIS — E86.0 DEHYDRATION: ICD-10-CM

## 2021-09-23 LAB
ALBUMIN SERPL-MCNC: 4.1 G/DL (ref 3.5–5.2)
ALP BLD-CCNC: 95 U/L (ref 35–104)
ALT SERPL-CCNC: 15 U/L (ref 0–32)
ANION GAP SERPL CALCULATED.3IONS-SCNC: 14 MMOL/L (ref 7–16)
AST SERPL-CCNC: 16 U/L (ref 0–31)
BACTERIA: ABNORMAL /HPF
BASOPHILS ABSOLUTE: 0.07 E9/L (ref 0–0.2)
BASOPHILS RELATIVE PERCENT: 0.8 % (ref 0–2)
BILIRUB SERPL-MCNC: 0.2 MG/DL (ref 0–1.2)
BILIRUBIN URINE: NEGATIVE
BLOOD, URINE: NEGATIVE
BUN BLDV-MCNC: 18 MG/DL (ref 6–20)
CALCIUM SERPL-MCNC: 8.8 MG/DL (ref 8.6–10.2)
CHLORIDE BLD-SCNC: 99 MMOL/L (ref 98–107)
CLARITY: CLEAR
CO2: 25 MMOL/L (ref 22–29)
COLOR: YELLOW
CREAT SERPL-MCNC: 1 MG/DL (ref 0.5–1)
EKG ATRIAL RATE: 83 BPM
EKG P AXIS: 3 DEGREES
EKG P-R INTERVAL: 124 MS
EKG Q-T INTERVAL: 396 MS
EKG QRS DURATION: 86 MS
EKG QTC CALCULATION (BAZETT): 465 MS
EKG R AXIS: -37 DEGREES
EKG T AXIS: 32 DEGREES
EKG VENTRICULAR RATE: 83 BPM
EOSINOPHILS ABSOLUTE: 0.24 E9/L (ref 0.05–0.5)
EOSINOPHILS RELATIVE PERCENT: 2.6 % (ref 0–6)
EPITHELIAL CELLS, UA: ABNORMAL /HPF
GFR AFRICAN AMERICAN: >60
GFR NON-AFRICAN AMERICAN: 58 ML/MIN/1.73
GLUCOSE BLD-MCNC: 206 MG/DL (ref 74–99)
GLUCOSE URINE: >=1000 MG/DL
HCT VFR BLD CALC: 38.7 % (ref 34–48)
HEMOGLOBIN: 13.1 G/DL (ref 11.5–15.5)
IMMATURE GRANULOCYTES #: 0.04 E9/L
IMMATURE GRANULOCYTES %: 0.4 % (ref 0–5)
INFLUENZA A BY PCR: NOT DETECTED
INFLUENZA B BY PCR: NOT DETECTED
KETONES, URINE: NEGATIVE MG/DL
LACTIC ACID: 3.2 MMOL/L (ref 0.5–2.2)
LEUKOCYTE ESTERASE, URINE: ABNORMAL
LIPASE: 68 U/L (ref 13–60)
LYMPHOCYTES ABSOLUTE: 2.6 E9/L (ref 1.5–4)
LYMPHOCYTES RELATIVE PERCENT: 28.3 % (ref 20–42)
MCH RBC QN AUTO: 28.3 PG (ref 26–35)
MCHC RBC AUTO-ENTMCNC: 33.9 % (ref 32–34.5)
MCV RBC AUTO: 83.6 FL (ref 80–99.9)
MONOCYTES ABSOLUTE: 0.63 E9/L (ref 0.1–0.95)
MONOCYTES RELATIVE PERCENT: 6.8 % (ref 2–12)
NEUTROPHILS ABSOLUTE: 5.62 E9/L (ref 1.8–7.3)
NEUTROPHILS RELATIVE PERCENT: 61.1 % (ref 43–80)
NITRITE, URINE: NEGATIVE
PDW BLD-RTO: 12.8 FL (ref 11.5–15)
PH UA: 6 (ref 5–9)
PLATELET # BLD: 336 E9/L (ref 130–450)
PMV BLD AUTO: 10 FL (ref 7–12)
POTASSIUM REFLEX MAGNESIUM: 3.9 MMOL/L (ref 3.5–5)
PROTEIN UA: NEGATIVE MG/DL
RBC # BLD: 4.63 E12/L (ref 3.5–5.5)
RBC UA: ABNORMAL /HPF (ref 0–2)
SODIUM BLD-SCNC: 138 MMOL/L (ref 132–146)
SPECIFIC GRAVITY UA: 1.01 (ref 1–1.03)
TOTAL PROTEIN: 7.3 G/DL (ref 6.4–8.3)
TROPONIN, HIGH SENSITIVITY: 8 NG/L (ref 0–9)
UROBILINOGEN, URINE: 0.2 E.U./DL
WBC # BLD: 9.2 E9/L (ref 4.5–11.5)
WBC UA: ABNORMAL /HPF (ref 0–5)

## 2021-09-23 PROCEDURE — U0005 INFEC AGEN DETEC AMPLI PROBE: HCPCS

## 2021-09-23 PROCEDURE — 71045 X-RAY EXAM CHEST 1 VIEW: CPT

## 2021-09-23 PROCEDURE — U0003 INFECTIOUS AGENT DETECTION BY NUCLEIC ACID (DNA OR RNA); SEVERE ACUTE RESPIRATORY SYNDROME CORONAVIRUS 2 (SARS-COV-2) (CORONAVIRUS DISEASE [COVID-19]), AMPLIFIED PROBE TECHNIQUE, MAKING USE OF HIGH THROUGHPUT TECHNOLOGIES AS DESCRIBED BY CMS-2020-01-R: HCPCS

## 2021-09-23 PROCEDURE — 83690 ASSAY OF LIPASE: CPT

## 2021-09-23 PROCEDURE — 81001 URINALYSIS AUTO W/SCOPE: CPT

## 2021-09-23 PROCEDURE — 99283 EMERGENCY DEPT VISIT LOW MDM: CPT

## 2021-09-23 PROCEDURE — 83605 ASSAY OF LACTIC ACID: CPT

## 2021-09-23 PROCEDURE — 6360000002 HC RX W HCPCS: Performed by: EMERGENCY MEDICINE

## 2021-09-23 PROCEDURE — 87502 INFLUENZA DNA AMP PROBE: CPT

## 2021-09-23 PROCEDURE — 85025 COMPLETE CBC W/AUTO DIFF WBC: CPT

## 2021-09-23 PROCEDURE — 96374 THER/PROPH/DIAG INJ IV PUSH: CPT

## 2021-09-23 PROCEDURE — 6360000004 HC RX CONTRAST MEDICATION: Performed by: RADIOLOGY

## 2021-09-23 PROCEDURE — 80053 COMPREHEN METABOLIC PANEL: CPT

## 2021-09-23 PROCEDURE — 84484 ASSAY OF TROPONIN QUANT: CPT

## 2021-09-23 PROCEDURE — 74177 CT ABD & PELVIS W/CONTRAST: CPT

## 2021-09-23 PROCEDURE — 93005 ELECTROCARDIOGRAM TRACING: CPT | Performed by: PHYSICIAN ASSISTANT

## 2021-09-23 PROCEDURE — 96361 HYDRATE IV INFUSION ADD-ON: CPT

## 2021-09-23 PROCEDURE — 2580000003 HC RX 258: Performed by: EMERGENCY MEDICINE

## 2021-09-23 RX ORDER — ONDANSETRON 2 MG/ML
4 INJECTION INTRAMUSCULAR; INTRAVENOUS ONCE
Status: COMPLETED | OUTPATIENT
Start: 2021-09-23 | End: 2021-09-23

## 2021-09-23 RX ORDER — 0.9 % SODIUM CHLORIDE 0.9 %
1000 INTRAVENOUS SOLUTION INTRAVENOUS ONCE
Status: COMPLETED | OUTPATIENT
Start: 2021-09-23 | End: 2021-09-23

## 2021-09-23 RX ADMIN — SODIUM CHLORIDE 1000 ML: 9 INJECTION, SOLUTION INTRAVENOUS at 13:55

## 2021-09-23 RX ADMIN — ONDANSETRON 4 MG: 2 INJECTION INTRAMUSCULAR; INTRAVENOUS at 13:55

## 2021-09-23 RX ADMIN — IOPAMIDOL 75 ML: 755 INJECTION, SOLUTION INTRAVENOUS at 14:28

## 2021-09-23 ASSESSMENT — PAIN SCALES - GENERAL: PAINLEVEL_OUTOF10: 0

## 2021-09-23 ASSESSMENT — PAIN DESCRIPTION - PAIN TYPE: TYPE: ACUTE PAIN

## 2021-09-23 ASSESSMENT — PAIN DESCRIPTION - LOCATION: LOCATION: ABDOMEN

## 2021-09-23 NOTE — ED PROVIDER NOTES
HPI:  9/23/21, Time: 1:43 PM EDT         Pastora Cohen is a 46 y.o. female presenting to the ED for diffuse abdominal pain, diarrhea, nausea, sinus congestion, left ear pain and chronic lower dental pain, cough and body aches, beginning 3-4 days ago. Her son has the same thing. She has been vaccinated against covid. The complaint has been persistent, moderate in severity, and worsened by nothing. She has Zofran at home. Patient denies fever/chills, sore throat, chest pain, shortness of breath, edema, headache, visual disturbances, focal paresthesias, focal weakness,  vomiting, constipation, dysuria, hematuria, trauma, neck or back pain, rash or other complaints. ROS:   A complete review of systems was performed and all pertinent positives and negatives are stated within HPI, all other systems reviewed and are negative.      --------------------------------------------- PAST HISTORY ---------------------------------------------  Past Medical History:  has a past medical history of Back pain, CAD in native artery, Depression with anxiety, Diabetes mellitus ([facility] [address] Fatty liver, Gallstones, GERD (gastroesophageal reflux disease), Hiatal hernia, Hyperlipidemia, Hypertension, Metabolic disorder, MTHFR (methylene THF reductase) deficiency and homocystinuria ([facility] [address] MTHFR mutation, Nasal septal deviation, NSTEMI (non-ST elevated myocardial infarction) ([facility] [address] Preoperative clearance, Prolonged emergence from general anesthesia, Sleep apnea, and Unspecified diseases of blood and blood-forming organs. Past Surgical History:  has a past surgical history that includes Hysterectomy; Cholecystectomy; Ectopic pregnancy surgery; ERCP; Sinus endoscopy (Bilateral, 10/27/2014); Abdomen surgery; ERCP (N/A, 1/4/2020); ERCP (1/4/2020); ERCP (1/4/2020); ERCP (1/4/2020); ERCP (N/A, 2/24/2020); Cardiac surgery (03/16/2020); ERCP (N/A, 7/30/2020); and ERCP (7/30/2020). Social History:  reports that she has never smoked.  She has never used smokeless tobacco. She reports previous alcohol use. She reports that she does not use drugs. Family History: family history includes Breast Cancer in her paternal aunt; Diabetes in her father and mother; Heart Disease in her mother; High Blood Pressure in her brother and brother; Other in her brother. The patients home medications have been reviewed. Allergies: Latex, Morphine, Other, Statins, Adhesive tape, and Sulfa antibiotics        ----------------------------------------PHYSICAL EXAM--------------------------------------  Constitutional:  Well developed, well nourished, no acute distress, non-toxic appearance   Eyes:  PERRL, conjunctiva normal, EOMI  HENT:  Atraumatic, external ears normal, nose normal, oropharynx moist, no pharyngeal exudates, redness or swelling. TMs clear and intact bilaterally. No sinus congestion. Dentition intact. Neck- normal range of motion, no nuchal rigidity   Respiratory:  No respiratory distress, normal breath sounds, no rales, no wheezing   Cardiovascular:  Normal rate, normal rhythm, no murmurs, no gallops, no rubs. Radial pulses 2+ bilaterally. GI:  Soft, nondistended, normal bowel sounds, nontender, no organomegaly, no mass, no rebound, no guarding   :  No costovertebral angle tenderness   Musculoskeletal:  No edema, no tenderness, no deformities. Back- no tenderness  Integument:  Well hydrated, no visible rash. Adequate perfusion. Lymphatic:  No cervical lymphadenopathy noted   Neurologic:  Alert & oriented x 3, CN 2-12 normal, no focal deficits noted. Normal gait. Psychiatric:  Speech and behavior appropriate       -------------------------------------------------- RESULTS -------------------------------------------------  I have personally reviewed all laboratory and imaging results for this patient. Results are listed below.      LABS:  Results for orders placed or performed during the hospital encounter of 09/23/21   Rapid influenza A/B antigens    Specimen: Nasopharyngeal   Result Value Ref Range    Influenza A by PCR Not Detected Not Detected    Influenza B by PCR Not Detected Not Detected   CBC auto differential   Result Value Ref Range    WBC 9.2 4.5 - 11.5 E9/L    RBC 4.63 3.50 - 5.50 E12/L    Hemoglobin 13.1 11.5 - 15.5 g/dL    Hematocrit 38.7 34.0 - 48.0 %    MCV 83.6 80.0 - 99.9 fL    MCH 28.3 26.0 - 35.0 pg    MCHC 33.9 32.0 - 34.5 %    RDW 12.8 11.5 - 15.0 fL    Platelets 239 416 - 909 E9/L    MPV 10.0 7.0 - 12.0 fL    Neutrophils % 61.1 43.0 - 80.0 %    Immature Granulocytes % 0.4 0.0 - 5.0 %    Lymphocytes % 28.3 20.0 - 42.0 %    Monocytes % 6.8 2.0 - 12.0 %    Eosinophils % 2.6 0.0 - 6.0 %    Basophils % 0.8 0.0 - 2.0 %    Neutrophils Absolute 5.62 1.80 - 7.30 E9/L    Immature Granulocytes # 0.04 E9/L    Lymphocytes Absolute 2.60 1.50 - 4.00 E9/L    Monocytes Absolute 0.63 0.10 - 0.95 E9/L    Eosinophils Absolute 0.24 0.05 - 0.50 E9/L    Basophils Absolute 0.07 0.00 - 0.20 E9/L   Comprehensive Metabolic Panel w/ Reflex to MG   Result Value Ref Range    Sodium 138 132 - 146 mmol/L    Potassium reflex Magnesium 3.9 3.5 - 5.0 mmol/L    Chloride 99 98 - 107 mmol/L    CO2 25 22 - 29 mmol/L    Anion Gap 14 7 - 16 mmol/L    Glucose 206 (H) 74 - 99 mg/dL    BUN 18 6 - 20 mg/dL    CREATININE 1.0 0.5 - 1.0 mg/dL    GFR Non-African American 58 >=60 mL/min/1.73    GFR African American >60     Calcium 8.8 8.6 - 10.2 mg/dL    Total Protein 7.3 6.4 - 8.3 g/dL    Albumin 4.1 3.5 - 5.2 g/dL    Total Bilirubin 0.2 0.0 - 1.2 mg/dL    Alkaline Phosphatase 95 35 - 104 U/L    ALT 15 0 - 32 U/L    AST 16 0 - 31 U/L   Lipase   Result Value Ref Range    Lipase 68 (H) 13 - 60 U/L   Lactic Acid, Plasma   Result Value Ref Range    Lactic Acid 3.2 (H) 0.5 - 2.2 mmol/L   Troponin   Result Value Ref Range    Troponin, High Sensitivity 8 0 - 9 ng/L   Urinalysis   Result Value Ref Range    Color, UA Yellow Straw/Yellow    Clarity, UA Clear Clear    Glucose, Ur >=1000 (A) Negative mg/dL    Bilirubin Urine Negative Negative    Ketones, Urine Negative Negative mg/dL    Specific Gravity, UA 1.010 1.005 - 1.030    Blood, Urine Negative Negative    pH, UA 6.0 5.0 - 9.0    Protein, UA Negative Negative mg/dL    Urobilinogen, Urine 0.2 <2.0 E.U./dL    Nitrite, Urine Negative Negative    Leukocyte Esterase, Urine TRACE (A) Negative   Microscopic Urinalysis   Result Value Ref Range    WBC, UA 2-5 0 - 5 /HPF    RBC, UA 1-3 0 - 2 /HPF    Epithelial Cells, UA RARE /HPF    Bacteria, UA RARE (A) None Seen /HPF   EKG 12 Lead   Result Value Ref Range    Ventricular Rate 83 BPM    Atrial Rate 83 BPM    P-R Interval 124 ms    QRS Duration 86 ms    Q-T Interval 396 ms    QTc Calculation (Bazett) 465 ms    P Axis 3 degrees    R Axis -37 degrees    T Axis 32 degrees       RADIOLOGY:  Interpreted by Radiologist.  CT ABDOMEN PELVIS W IV CONTRAST Additional Contrast? None   Final Result   1. There is no acute intra-abdominal or intrapelvic pathology. Specifically, there are no findings of appendicitis, obstructive uropathy or   inflammatory bowel disease      2. Hepatic steatosis more prominent within the left hepatic lobe. 3.  Pneumobilia consistent with previous  instrumentation. XR CHEST 1 VIEW   Final Result   No radiographic evidence of acute cardiopulmonary disease process             ------------------------- NURSING NOTES AND VITALS REVIEWED ---------------------------  The nursing notes within the ED encounter and vital signs as below have been reviewed by myself. /78   Temp 96.8 °F (36 °C) (Infrared)   Resp 14   Ht 5' 3\" (1.6 m)   Wt 195 lb (88.5 kg)   SpO2 97%   BMI 34.54 kg/m²   Oxygen Saturation Interpretation: Normal      The patients available past medical records and past encounters were reviewed.         ------------------------------ ED COURSE/MEDICAL DECISION MAKING----------------------  Medications   0.9 % sodium chloride bolus (0 mLs IntraVENous Stopped 9/23/21 1518)   ondansetron (ZOFRAN) injection 4 mg (4 mg IntraVENous Given 9/23/21 1355)   iopamidol (ISOVUE-370) 76 % injection 75 mL (75 mLs IntraVENous Given 9/23/21 1428)           Procedures:   none      Medical Decision Making:    Dehydration, IV fluids and IV Zofran given. She has a prescription for Zofran at home already. Likely viral syndrome. Flu negative here. Could still be Covid even though she was vaccinated. Her son is sick with the exact same. Chest x-ray negative. She is not hypoxic, not toxic, vital signs stable. She is neurovascularly intact and ambulatory upon discharge. She was advised that her Covid results will come back in 3 to 5 days. Isolation and Covid precautions reviewed. She understands and agrees with the plan. She is neurovascularly intact and ambulatory upon discharge. She was advised of incidental findings of fatty liver, she states she already knew about this. Patient was explicitly instructed on specific signs and symptoms on which to return to the emergency room for. Patient was instructed to return to the ER for any new or worsening symptoms. Additional discharge instructions were given verbally. All questions were answered. Patient is comfortable and agreeable with discharge plan. Patient in no acute distress and non-toxic in appearance. This patient's ED course included: re-evaluation prior to disposition, IV medications and a personal history and physicial eaxmination    This patient has remained hemodynamically stable, improved and been closely monitored during their ED course. Re-Evaluations:  Time: 3:00 PM EDT   Re-evaluation. Patients symptoms are improving  Repeat physical examination is not changed      Consultations:   none    Critical Care: none    I, Gabbi Hidden, DO, am the Primary Provider of Record    Counseling:   The emergency provider has spoken with the patient and discussed todays results, in addition to providing specific details for the plan of care and counseling regarding the diagnosis and prognosis. Questions are answered at this time and they are agreeable with the plan.    --------------------------- IMPRESSION AND DISPOSITION ---------------------------------    IMPRESSION  1. Viral syndrome    2. Encounter for laboratory testing for COVID-19 virus    3. Fatty liver    4.  Dehydration        DISPOSITION  Disposition: Discharge to home  Patient condition is stable             Zayra DO Anne Marie  09/23/21 1535

## 2021-09-23 NOTE — ED NOTES
FIRST PROVIDER CONTACT ASSESSMENT NOTE                                                                                                Department of Emergency Medicine                                                      First Provider Note  21  11:53 AM EDT  NAME: Eloisa Sotelo  : 1970  MRN: [id number]    Chief Complaint: No chief complaint on file. History of Present Illness:   Eloisa Sotelo is a 46 y.o. female who presents to the ED for Abdominal pain 3 days  Concern for covid     Focused Physical Exam:  VS:    ED Triage Vitals [21 1107]   BP Temp Temp Source Pulse Resp SpO2 Height Weight   -- 96.8 °F (36 °C) Infrared -- -- -- -- --        General: Alert and in no apparent distress. Heart rrr  Lungs clear      Medical History:  has a past medical history of Back pain, CAD in native artery, Depression with anxiety, Diabetes mellitus ([facility] [address] Fatty liver, Gallstones, GERD (gastroesophageal reflux disease), Hiatal hernia, Hyperlipidemia, Hypertension, Metabolic disorder, MTHFR (methylene THF reductase) deficiency and homocystinuria ([facility] [address] MTHFR mutation, Nasal septal deviation, NSTEMI (non-ST elevated myocardial infarction) ([facility] [address] Preoperative clearance, Prolonged emergence from general anesthesia, Sleep apnea, and Unspecified diseases of blood and blood-forming organs. Surgical History:  has a past surgical history that includes Hysterectomy; Cholecystectomy; Ectopic pregnancy surgery; ERCP; Sinus endoscopy (Bilateral, 10/27/2014); Abdomen surgery; ERCP (N/A, 2020); ERCP (2020); ERCP (2020); ERCP (2020); ERCP (N/A, 2020); Cardiac surgery (2020); ERCP (N/A, 2020); and ERCP (2020). Social History:  reports that she has never smoked. She has never used smokeless tobacco. She reports previous alcohol use. She reports that she does not use drugs.     Family History: family history includes Breast Cancer in her paternal aunt; Diabetes in her father and mother; Heart Disease in her mother; High Blood Pressure in her brother and brother; Other in her brother.     Allergies: Latex, Morphine, Other, Statins, Adhesive tape, and Sulfa antibiotics     Initial Plan of Care:  Initiate Treatment-Testing, Proceed toTreatment Area When Bed Available for ED Attending/MLP to Continue Care    -------------------------------------------------[address] ASSESSMENT NOTE--------------------------------------------------------  Electronically signed by RAJENDRA Montes   DD: 9/23/21     RAJENDRA Montes  09/23/21 1156

## 2021-09-24 LAB — SARS-COV-2, PCR: NOT DETECTED

## 2022-06-20 ENCOUNTER — HOSPITAL ENCOUNTER (OUTPATIENT)
Age: 52
Discharge: HOME | End: 2022-06-20

## 2022-06-20 LAB
ALBUMIN SERPL-MCNC: 4.7 G/DL (ref 3.5–5.2)
ALP BLD-CCNC: 93 U/L (ref 35–104)
ALT SERPL-CCNC: 19 U/L (ref 0–32)
ANION GAP SERPL CALCULATED.3IONS-SCNC: 14 MMOL/L (ref 7–16)
AST SERPL-CCNC: 19 U/L (ref 0–31)
BACTERIA: NORMAL /HPF
BASOPHILS ABSOLUTE: 0.07 E9/L (ref 0–0.2)
BASOPHILS RELATIVE PERCENT: 0.9 % (ref 0–2)
BILIRUB SERPL-MCNC: 0.4 MG/DL (ref 0–1.2)
BILIRUBIN URINE: NEGATIVE
BLOOD, URINE: ABNORMAL
BUN BLDV-MCNC: 20 MG/DL (ref 6–20)
CALCIUM SERPL-MCNC: 9.8 MG/DL (ref 8.6–10.2)
CHLORIDE BLD-SCNC: 100 MMOL/L (ref 98–107)
CHOLESTEROL, FASTING: 247 MG/DL (ref 0–199)
CLARITY: ABNORMAL
CO2: 27 MMOL/L (ref 22–29)
COLOR: YELLOW
CREAT SERPL-MCNC: 1.1 MG/DL (ref 0.5–1)
EOSINOPHILS ABSOLUTE: 0.3 E9/L (ref 0.05–0.5)
EOSINOPHILS RELATIVE PERCENT: 3.7 % (ref 0–6)
GFR AFRICAN AMERICAN: >60
GFR NON-AFRICAN AMERICAN: 52 ML/MIN/1.73
GLUCOSE FASTING: 202 MG/DL (ref 74–99)
GLUCOSE URINE: 250 MG/DL
HBA1C MFR BLD: 8.6 % (ref 4–5.6)
HCT VFR BLD CALC: 43.1 % (ref 34–48)
HDLC SERPL-MCNC: 40 MG/DL
HEMOGLOBIN: 14.5 G/DL (ref 11.5–15.5)
IMMATURE GRANULOCYTES #: 0.03 E9/L
IMMATURE GRANULOCYTES %: 0.4 % (ref 0–5)
KETONES, URINE: NEGATIVE MG/DL
LDL CHOLESTEROL CALCULATED: ABNORMAL MG/DL (ref 0–99)
LEUKOCYTE ESTERASE, URINE: ABNORMAL
LYMPHOCYTES ABSOLUTE: 2.56 E9/L (ref 1.5–4)
LYMPHOCYTES RELATIVE PERCENT: 31.7 % (ref 20–42)
MCH RBC QN AUTO: 28.2 PG (ref 26–35)
MCHC RBC AUTO-ENTMCNC: 33.6 % (ref 32–34.5)
MCV RBC AUTO: 83.7 FL (ref 80–99.9)
MICROALBUMIN UR-MCNC: 19.3 MG/L
MONOCYTES ABSOLUTE: 0.35 E9/L (ref 0.1–0.95)
MONOCYTES RELATIVE PERCENT: 4.3 % (ref 2–12)
NEUTROPHILS ABSOLUTE: 4.76 E9/L (ref 1.8–7.3)
NEUTROPHILS RELATIVE PERCENT: 59 % (ref 43–80)
NITRITE, URINE: NEGATIVE
PDW BLD-RTO: 12.9 FL (ref 11.5–15)
PH UA: 5 (ref 5–9)
PLATELET # BLD: 364 E9/L (ref 130–450)
PMV BLD AUTO: 10.1 FL (ref 7–12)
POTASSIUM SERPL-SCNC: 3.9 MMOL/L (ref 3.5–5)
PROTEIN UA: NEGATIVE MG/DL
RBC # BLD: 5.15 E12/L (ref 3.5–5.5)
RBC UA: NORMAL /HPF (ref 0–2)
SODIUM BLD-SCNC: 141 MMOL/L (ref 132–146)
SPECIFIC GRAVITY UA: >=1.03 (ref 1–1.03)
TOTAL PROTEIN: 7.7 G/DL (ref 6.4–8.3)
TRIGLYCERIDE, FASTING: 456 MG/DL (ref 0–149)
TSH SERPL DL<=0.05 MIU/L-ACNC: 2.81 UIU/ML (ref 0.27–4.2)
UROBILINOGEN, URINE: 0.2 E.U./DL
VITAMIN B-12: 285 PG/ML (ref 211–946)
VITAMIN D 25-HYDROXY: 24 NG/ML (ref 30–100)
VLDLC SERPL CALC-MCNC: ABNORMAL MG/DL
WBC # BLD: 8.1 E9/L (ref 4.5–11.5)
WBC UA: NORMAL /HPF (ref 0–5)

## 2022-06-20 PROCEDURE — 82044 UR ALBUMIN SEMIQUANTITATIVE: CPT

## 2022-06-20 PROCEDURE — 84443 ASSAY THYROID STIM HORMONE: CPT

## 2022-06-20 PROCEDURE — 80061 LIPID PANEL: CPT

## 2022-06-20 PROCEDURE — 83036 HEMOGLOBIN GLYCOSYLATED A1C: CPT

## 2022-06-20 PROCEDURE — 82306 VITAMIN D 25 HYDROXY: CPT

## 2022-06-20 PROCEDURE — 85025 COMPLETE CBC W/AUTO DIFF WBC: CPT

## 2022-06-20 PROCEDURE — 81001 URINALYSIS AUTO W/SCOPE: CPT

## 2022-06-20 PROCEDURE — 82607 VITAMIN B-12: CPT

## 2022-06-20 PROCEDURE — 80053 COMPREHEN METABOLIC PANEL: CPT

## 2022-06-20 PROCEDURE — 36415 COLL VENOUS BLD VENIPUNCTURE: CPT

## 2022-10-07 ENCOUNTER — HOSPITAL ENCOUNTER (EMERGENCY)
Age: 52
Discharge: HOME | End: 2022-10-07

## 2022-10-07 VITALS
HEART RATE: 99 BPM | OXYGEN SATURATION: 100 % | BODY MASS INDEX: 35.44 KG/M2 | RESPIRATION RATE: 18 BRPM | TEMPERATURE: 98.2 F | DIASTOLIC BLOOD PRESSURE: 107 MMHG | WEIGHT: 200 LBS | HEIGHT: 63 IN | SYSTOLIC BLOOD PRESSURE: 164 MMHG

## 2022-10-07 DIAGNOSIS — J01.00 ACUTE MAXILLARY SINUSITIS, RECURRENCE NOT SPECIFIED: Primary | ICD-10-CM

## 2022-10-07 PROCEDURE — 99211 OFF/OP EST MAY X REQ PHY/QHP: CPT

## 2022-10-07 RX ORDER — CLINDAMYCIN HYDROCHLORIDE 150 MG/1
300 CAPSULE ORAL 3 TIMES DAILY
Qty: 60 CAPSULE | Refills: 0 | Status: SHIPPED | OUTPATIENT
Start: 2022-10-07 | End: 2022-10-17

## 2022-10-07 ASSESSMENT — PAIN DESCRIPTION - LOCATION: LOCATION: EAR

## 2022-10-07 ASSESSMENT — PAIN - FUNCTIONAL ASSESSMENT: PAIN_FUNCTIONAL_ASSESSMENT: 0-10

## 2022-10-07 ASSESSMENT — PAIN SCALES - GENERAL: PAINLEVEL_OUTOF10: 7

## 2022-10-07 NOTE — ED PROVIDER NOTES
[address] [facility]  Department of Emergency Medicine   ED  Encounter Note  Admit Date/RoomTime: 10/7/2022  8:08 AM  ED Room:   NAME: Chikis Merino  : 1970  MRN: [id number]     Chief Complaint:  Cough ( Earache  sore throat  congestion  sinus hurt  have   not taken her BP meds today)    HISTORY OF PRESENT ILLNESS        Chikis Merino is a 46 y.o. female who presents to the ED with a complaint of cold-like illness that is getting worse. Patient states this is day 5 of a cold. Started with a sore throat and congestion. Now her sinuses are just significantly stuffed and she is having a lot of facial pressure. Does think she had a fever on one of the days. And then woke up today and her ears are killing her, left worse than right. Patient denies shortness of breath. Denies nausea, vomiting and diarrhea. Patient is COVID vaccinated. Symptoms are moderate in severity. She is not taking any over-the-counter medications for these complaints. ROS   Pertinent positives and negatives are stated within HPI, all other systems reviewed and are negative. Past Medical History:  has a past medical history of Back pain, CAD in native artery, Depression with anxiety, Diabetes mellitus ([address] Fatty liver, Gallstones, GERD (gastroesophageal reflux disease), Hiatal hernia, Hyperlipidemia, Hypertension, Metabolic disorder, MTHFR (methylene THF reductase) deficiency and homocystinuria ([address] MTHFR mutation, Nasal septal deviation, NSTEMI (non-ST elevated myocardial infarction) ([address] Preoperative clearance, Prolonged emergence from general anesthesia, Sleep apnea, and Unspecified diseases of blood and blood-forming organs. Surgical History:  has a past surgical history that includes Hysterectomy; Cholecystectomy; Ectopic pregnancy surgery; ERCP; Sinus endoscopy (Bilateral, 10/27/2014);  Abdomen surgery; ERCP (N/A, 2020); ERCP (2020); ERCP (2020); ERCP (2020); ERCP (N/A, 2/24/2020); Cardiac surgery (03/16/2020); ERCP (N/A, 7/30/2020); and ERCP (7/30/2020). Social History:  reports that she has never smoked. She has never used smokeless tobacco. She reports that she does not currently use alcohol. She reports that she does not use drugs. Family History: family history includes Breast Cancer in her paternal aunt; Diabetes in her father and mother; Heart Disease in her mother; High Blood Pressure in her brother and brother; Other in her brother. Allergies: Latex, Morphine, Other, Statins, Adhesive tape, and Sulfa antibiotics    PHYSICAL EXAM   Oxygen Saturation Interpretation: Normal on room air analysis. ED Triage Vitals [10/07/22 0813]   BP Temp Temp src Heart Rate Resp SpO2 Height Weight   (!) 164/107 98.2 °F (36.8 °C) -- 99 18 100 % 5' 3\" (1.6 m) 200 lb (90.7 kg)         General:  NAD. Alert and Oriented. Well-appearing. Skin:  Warm, dry. No rashes. Head:  Normocephalic. Atraumatic. Eyes:  EOMI. Conjunctiva normal.  ENT:  Oral mucosa moist.  Airway patent. Posterior pharynx without erythema, without swelling, without exudate. Uvula is midline with equal rise. Positive discolored drainage to the posterior pharynx wall. Bilateral TMs are significantly bulging, left is erythematous. Positive sinus tenderness across the maxillary sinuses. Neck:  Supple. Normal ROM. Respiratory:  No respiratory distress. No labored breathing. Lungs clear without rales, rhonchi or wheezing. Cardiovascular:  Regular rate. No Murmur. No peripheral edema. Extremities warm and good color. Extremities:  Normal ROM. Nontender to palpation. Atraumatic. Back:  Normal ROM. Nontender to palpation. Neuro:  Alert and Oriented to person, place, time and situation. Normal LOC. Moves all extremities. Speech fluent. Psych:  Calm and Cooperative. Normal thought process. Normal judgement.     Lab / Imaging Results   (All laboratory and radiology results have been personally reviewed by myself)  Labs:  No results found for this visit on 10/07/22. Imaging: All Radiology results interpreted by Radiologist unless otherwise noted. No orders to display       ED Course / Medical Decision Making   Medications - No data to display     Re-examination:  10/7/22       Time:   Patients condition . Consult(s):   None    Procedure(s):   None    MDM:   Positive URI that has progressed to a sinus infection and requires antibiotics with a significantly erythematous and bulging ears. Patient has elected to take over-the-counter Tylenol cold and sinus medication. Discussed with patient prescribing penicillin antibiotic and she states penicillin never works for her and she wants clindamycin or a Z-Justo. Plan of Care/Counseling:  Raghu Babcock reviewed today's visit with the patient in addition to providing specific details for the plan of care and counseling regarding the diagnosis and prognosis. Questions are answered at this time and are agreeable with the plan. ASSESSMENT     1. Acute maxillary sinusitis, recurrence not specified New Problem     PLAN   Discharged home. Patient condition is good    New Medications     New Prescriptions    CLINDAMYCIN (CLEOCIN) 150 MG CAPSULE    Take 2 capsules by mouth 3 times daily for 10 days     Electronically signed by RAJENDRA Babcock   DD: 10/7/22  **This report was transcribed using voice recognition software. Every effort was made to ensure accuracy; however, inadvertent computerized transcription errors may be present.   END OF ED PROVIDER NOTE       Raghu Babcock  10/07/22 [id number]

## 2023-01-31 ENCOUNTER — APPOINTMENT (OUTPATIENT)
Dept: CT IMAGING | Age: 53
End: 2023-01-31

## 2023-01-31 ENCOUNTER — HOSPITAL ENCOUNTER (EMERGENCY)
Age: 53
Discharge: HOME | End: 2023-01-31
Attending: EMERGENCY MEDICINE

## 2023-01-31 VITALS
DIASTOLIC BLOOD PRESSURE: 99 MMHG | RESPIRATION RATE: 16 BRPM | HEIGHT: 63 IN | HEART RATE: 77 BPM | OXYGEN SATURATION: 99 % | WEIGHT: 200 LBS | SYSTOLIC BLOOD PRESSURE: 171 MMHG | TEMPERATURE: 97.5 F | BODY MASS INDEX: 35.44 KG/M2

## 2023-01-31 DIAGNOSIS — G89.29 CHRONIC NONINTRACTABLE HEADACHE, UNSPECIFIED HEADACHE TYPE: Primary | ICD-10-CM

## 2023-01-31 DIAGNOSIS — R51.9 CHRONIC NONINTRACTABLE HEADACHE, UNSPECIFIED HEADACHE TYPE: Primary | ICD-10-CM

## 2023-01-31 DIAGNOSIS — I10 UNCONTROLLED HYPERTENSION: ICD-10-CM

## 2023-01-31 LAB
ANION GAP SERPL CALCULATED.3IONS-SCNC: 13 MMOL/L (ref 7–16)
BUN BLDV-MCNC: 18 MG/DL (ref 6–20)
CALCIUM SERPL-MCNC: 9.4 MG/DL (ref 8.6–10.2)
CHLORIDE BLD-SCNC: 94 MMOL/L (ref 98–107)
CO2: 28 MMOL/L (ref 22–29)
CREAT SERPL-MCNC: 1.5 MG/DL (ref 0.5–1)
EKG ATRIAL RATE: 85 BPM
EKG P AXIS: 19 DEGREES
EKG P-R INTERVAL: 128 MS
EKG Q-T INTERVAL: 408 MS
EKG QRS DURATION: 90 MS
EKG QTC CALCULATION (BAZETT): 485 MS
EKG R AXIS: -32 DEGREES
EKG T AXIS: 50 DEGREES
EKG VENTRICULAR RATE: 85 BPM
GFR SERPL CREATININE-BSD FRML MDRD: 41 ML/MIN/1.73
GLUCOSE BLD-MCNC: 220 MG/DL (ref 74–99)
HCT VFR BLD CALC: 43.7 % (ref 34–48)
HEMOGLOBIN: 14.6 G/DL (ref 11.5–15.5)
MCH RBC QN AUTO: 27.3 PG (ref 26–35)
MCHC RBC AUTO-ENTMCNC: 33.4 % (ref 32–34.5)
MCV RBC AUTO: 81.7 FL (ref 80–99.9)
PDW BLD-RTO: 12.3 FL (ref 11.5–15)
PLATELET # BLD: 355 E9/L (ref 130–450)
PMV BLD AUTO: 9.9 FL (ref 7–12)
POTASSIUM SERPL-SCNC: 3.2 MMOL/L (ref 3.5–5)
RBC # BLD: 5.35 E12/L (ref 3.5–5.5)
SODIUM BLD-SCNC: 135 MMOL/L (ref 132–146)
TROPONIN, HIGH SENSITIVITY: 12 NG/L (ref 0–9)
TROPONIN, HIGH SENSITIVITY: 12 NG/L (ref 0–9)
WBC # BLD: 13.6 E9/L (ref 4.5–11.5)

## 2023-01-31 PROCEDURE — 99284 EMERGENCY DEPT VISIT MOD MDM: CPT

## 2023-01-31 PROCEDURE — 6370000000 HC RX 637 (ALT 250 FOR IP): Performed by: EMERGENCY MEDICINE

## 2023-01-31 PROCEDURE — 93010 ELECTROCARDIOGRAM REPORT: CPT | Performed by: INTERNAL MEDICINE

## 2023-01-31 PROCEDURE — 80048 BASIC METABOLIC PNL TOTAL CA: CPT

## 2023-01-31 PROCEDURE — 85027 COMPLETE CBC AUTOMATED: CPT

## 2023-01-31 PROCEDURE — 2580000003 HC RX 258: Performed by: EMERGENCY MEDICINE

## 2023-01-31 PROCEDURE — 96375 TX/PRO/DX INJ NEW DRUG ADDON: CPT

## 2023-01-31 PROCEDURE — 6360000002 HC RX W HCPCS: Performed by: EMERGENCY MEDICINE

## 2023-01-31 PROCEDURE — 84484 ASSAY OF TROPONIN QUANT: CPT

## 2023-01-31 PROCEDURE — 70450 CT HEAD/BRAIN W/O DYE: CPT

## 2023-01-31 PROCEDURE — 96374 THER/PROPH/DIAG INJ IV PUSH: CPT

## 2023-01-31 PROCEDURE — 93005 ELECTROCARDIOGRAM TRACING: CPT | Performed by: EMERGENCY MEDICINE

## 2023-01-31 PROCEDURE — 2500000003 HC RX 250 WO HCPCS: Performed by: EMERGENCY MEDICINE

## 2023-01-31 RX ORDER — DIPHENHYDRAMINE HYDROCHLORIDE 50 MG/ML
25 INJECTION INTRAMUSCULAR; INTRAVENOUS ONCE
Status: COMPLETED | OUTPATIENT
Start: 2023-01-31 | End: 2023-01-31

## 2023-01-31 RX ORDER — HYDRALAZINE HYDROCHLORIDE 20 MG/ML
10 INJECTION INTRAMUSCULAR; INTRAVENOUS ONCE
Status: COMPLETED | OUTPATIENT
Start: 2023-01-31 | End: 2023-01-31

## 2023-01-31 RX ORDER — LABETALOL HYDROCHLORIDE 5 MG/ML
10 INJECTION, SOLUTION INTRAVENOUS ONCE
Status: COMPLETED | OUTPATIENT
Start: 2023-01-31 | End: 2023-01-31

## 2023-01-31 RX ORDER — 0.9 % SODIUM CHLORIDE 0.9 %
1000 INTRAVENOUS SOLUTION INTRAVENOUS ONCE
Status: COMPLETED | OUTPATIENT
Start: 2023-01-31 | End: 2023-01-31

## 2023-01-31 RX ORDER — KETOROLAC TROMETHAMINE 30 MG/ML
30 INJECTION, SOLUTION INTRAMUSCULAR; INTRAVENOUS ONCE
Status: COMPLETED | OUTPATIENT
Start: 2023-01-31 | End: 2023-01-31

## 2023-01-31 RX ORDER — METOCLOPRAMIDE HYDROCHLORIDE 5 MG/ML
10 INJECTION INTRAMUSCULAR; INTRAVENOUS ONCE
Status: COMPLETED | OUTPATIENT
Start: 2023-01-31 | End: 2023-01-31

## 2023-01-31 RX ADMIN — LABETALOL HYDROCHLORIDE 10 MG: 5 INJECTION INTRAVENOUS at 01:34

## 2023-01-31 RX ADMIN — SODIUM CHLORIDE 1000 ML: 9 INJECTION, SOLUTION INTRAVENOUS at 02:23

## 2023-01-31 RX ADMIN — POTASSIUM BICARBONATE 40 MEQ: 782 TABLET, EFFERVESCENT ORAL at 02:21

## 2023-01-31 RX ADMIN — KETOROLAC TROMETHAMINE 30 MG: 30 INJECTION, SOLUTION INTRAMUSCULAR at 02:59

## 2023-01-31 RX ADMIN — METOCLOPRAMIDE 10 MG: 5 INJECTION, SOLUTION INTRAMUSCULAR; INTRAVENOUS at 01:33

## 2023-01-31 RX ADMIN — DIPHENHYDRAMINE HYDROCHLORIDE 25 MG: 50 INJECTION, SOLUTION INTRAMUSCULAR; INTRAVENOUS at 01:33

## 2023-01-31 RX ADMIN — HYDRALAZINE HYDROCHLORIDE 10 MG: 20 INJECTION INTRAMUSCULAR; INTRAVENOUS at 04:10

## 2023-01-31 ASSESSMENT — PAIN SCALES - GENERAL
PAINLEVEL_OUTOF10: 10
PAINLEVEL_OUTOF10: 8
PAINLEVEL_OUTOF10: 2
PAINLEVEL_OUTOF10: 2

## 2023-01-31 ASSESSMENT — ENCOUNTER SYMPTOMS
SINUS PRESSURE: 0
EYE PAIN: 0
ABDOMINAL DISTENTION: 0
SORE THROAT: 0
EYE REDNESS: 0
DIARRHEA: 0
BACK PAIN: 0
SHORTNESS OF BREATH: 0
EYE DISCHARGE: 0
WHEEZING: 0
COUGH: 0
NAUSEA: 1
VOMITING: 1

## 2023-01-31 ASSESSMENT — PAIN DESCRIPTION - LOCATION
LOCATION: HEAD

## 2023-01-31 ASSESSMENT — PAIN - FUNCTIONAL ASSESSMENT
PAIN_FUNCTIONAL_ASSESSMENT: 0-10
PAIN_FUNCTIONAL_ASSESSMENT: 0-10

## 2023-01-31 NOTE — ED PROVIDER NOTES
Patient is a 47 y/o female who presents to the ED with a headache. Patient states that she has had intermittent headaches for the past 3 weeks. The headache is frontal and occipital and is currently 10/10. She states that she was diagnosed with sinusitis and took both an antibiotic and steroid, however, her headache has continued. She states that it feels like a migraine, however, she has never had a migraine this bad. She denies any fever, trauma or neck stiffness. She denies any chest pain, palpitations or shortness of breath. She is nauseated and has vomited. Review of Systems   Constitutional:  Negative for chills and fever. HENT:  Negative for ear pain, sinus pressure and sore throat. Eyes:  Negative for pain, discharge and redness. Respiratory:  Negative for cough, shortness of breath and wheezing. Cardiovascular:  Negative for chest pain. Gastrointestinal:  Positive for nausea and vomiting. Negative for abdominal distention and diarrhea. Genitourinary:  Negative for dysuria and frequency. Musculoskeletal:  Negative for arthralgias and back pain. Skin:  Negative for rash and wound. Neurological:  Positive for headaches. Negative for weakness. Hematological:  Negative for adenopathy. All other systems reviewed and are negative. Physical Exam  Vitals and nursing note reviewed. Constitutional:       General: She is not in acute distress. HENT:      Head: Normocephalic and atraumatic. Right Ear: External ear normal.      Left Ear: External ear normal.      Nose: Nose normal.      Mouth/Throat:      Mouth: Mucous membranes are moist.   Eyes:      Conjunctiva/sclera: Conjunctivae normal.      Pupils: Pupils are equal, round, and reactive to light. Neck:      Comments: No meningeal signs. Cardiovascular:      Rate and Rhythm: Normal rate and regular rhythm. Heart sounds: No murmur heard. Pulmonary:      Effort: Pulmonary effort is normal. No respiratory distress. Breath sounds: Normal breath sounds. No stridor. No wheezing, rhonchi or rales. Abdominal:      General: Bowel sounds are normal. There is no distension. Palpations: Abdomen is soft. Tenderness: There is no abdominal tenderness. There is no guarding. Musculoskeletal:         General: Normal range of motion. Cervical back: Normal range of motion and neck supple. No rigidity. Skin:     General: Skin is warm and dry. Findings: No rash. Neurological:      Mental Status: She is alert and oriented to person, place, and time. Procedures     MDM       History from : Patient    Limitations to history : None    Chronic Conditions: Diabetes mellitus, hypertension, hyperlipidemia, MTHFR deficiency, coronary artery disease    CONSULTS: (Who and What was discussed)  None    Discussion with Other Profesionals : None    Social Determinants : None    Records Reviewed : None    CC/HPI Summary, DDx, ED Course, and Reassessment: Patient is a 47 y/o female who presents to the ED with a headache. Patient states that she has had intermittent headaches for the past 3 weeks. The headache is frontal and occipital and is currently 10/10. She states that she was diagnosed with sinusitis and took both an antibiotic and steroid, however, her headache has continued. She states that it feels like a migraine, however, she has never had a migraine this bad. She denies any fever, trauma or neck stiffness. She denies any chest pain, palpitations or shortness of breath. She is nauseated and has vomited. EKG, labs, and CT head reviewed by myself. Labetalol 10 mg IV, hydralazine 10 mg IV, Reglan 10 mg IV and Benadryl 25 mg IV given in the ED. Patient re-evaluated and is feeling much better. Will discharge for outpatient follow up.       Disposition Considerations (Tests not ordered but considered, Shared Decision Making, Pt Expectation of Test or Tx.): Differential diagnoses include migraine, intracranial hemorrhage, meningitis, uncontrolled hypertension. Appropriate for outpatient management        I am the Primary Clinician of Record. EKG:  Normal sinus rhythm with ventricular rate of 85. WV interval, QRS duration and QT interval within normal range. Left axisdeviation. No ST segment abnormalities to suggest acute ischemia.           --------------------------------------------- PAST HISTORY ---------------------------------------------  Past Medical History:  has a past medical history of Back pain, CAD in native artery, Depression with anxiety, Diabetes mellitus ([facility] Fatty liver, Gallstones, GERD (gastroesophageal reflux disease), Hiatal hernia, Hyperlipidemia, Hypertension, Metabolic disorder, MTHFR (methylene THF reductase) deficiency and homocystinuria ([facility] MTHFR mutation, Nasal septal deviation, NSTEMI (non-ST elevated myocardial infarction) ([facility] Preoperative clearance, Prolonged emergence from general anesthesia, Sleep apnea, and Unspecified diseases of blood and blood-forming organs. Past Surgical History:  has a past surgical history that includes Hysterectomy; Cholecystectomy; Ectopic pregnancy surgery; ERCP; Sinus endoscopy (Bilateral, 10/27/2014); Abdomen surgery; ERCP (N/A, 1/4/2020); ERCP (1/4/2020); ERCP (1/4/2020); ERCP (1/4/2020); ERCP (N/A, 2/24/2020); Cardiac surgery (03/16/2020); ERCP (N/A, 7/30/2020); and ERCP (7/30/2020). Social History:  reports that she has never smoked. She has never used smokeless tobacco. She reports that she does not currently use alcohol. She reports that she does not use drugs. Family History: family history includes Breast Cancer in her paternal aunt; Diabetes in her father and mother; Heart Disease in her mother; High Blood Pressure in her brother and brother; Other in her brother. The patients home medications have been reviewed.     Allergies: Latex, Morphine, Other, Statins, Adhesive tape, and Sulfa antibiotics    -------------------------------------------------- RESULTS -------------------------------------------------  Labs:  Results for orders placed or performed during the hospital encounter of 77/20/93   Basic metabolic panel   Result Value Ref Range    Sodium 135 132 - 146 mmol/L    Potassium 3.2 (L) 3.5 - 5.0 mmol/L    Chloride 94 (L) 98 - 107 mmol/L    CO2 28 22 - 29 mmol/L    Anion Gap 13 7 - 16 mmol/L    Glucose 220 (H) 74 - 99 mg/dL    BUN 18 6 - 20 mg/dL    Creatinine 1.5 (H) 0.5 - 1.0 mg/dL    Est, Glom Filt Rate 41 >=60 mL/min/1.73    Calcium 9.4 8.6 - 10.2 mg/dL   CBC   Result Value Ref Range    WBC 13.6 (H) 4.5 - 11.5 E9/L    RBC 5.35 3.50 - 5.50 E12/L    Hemoglobin 14.6 11.5 - 15.5 g/dL    Hematocrit 43.7 34.0 - 48.0 %    MCV 81.7 80.0 - 99.9 fL    MCH 27.3 26.0 - 35.0 pg    MCHC 33.4 32.0 - 34.5 %    RDW 12.3 11.5 - 15.0 fL    Platelets 666 297 - 621 E9/L    MPV 9.9 7.0 - 12.0 fL   Troponin   Result Value Ref Range    Troponin, High Sensitivity 12 (H) 0 - 9 ng/L   Troponin   Result Value Ref Range    Troponin, High Sensitivity 12 (H) 0 - 9 ng/L   EKG 12 Lead   Result Value Ref Range    Ventricular Rate 85 BPM    Atrial Rate 85 BPM    P-R Interval 128 ms    QRS Duration 90 ms    Q-T Interval 408 ms    QTc Calculation (Bazett) 485 ms    P Axis 19 degrees    R Axis -32 degrees    T Axis 50 degrees       Radiology:  CT HEAD WO CONTRAST   Final Result   No acute intracranial abnormality. RECOMMENDATIONS:   Careful clinical correlation and follow up recommended. ------------------------- NURSING NOTES AND VITALS REVIEWED ---------------------------  Date / Time Roomed:  1/31/2023  1:01 AM  ED Bed Assignment:  21/21    The nursing notes within the ED encounter and vital signs as below have been reviewed.    BP (!) 171/99   Pulse 77   Temp 97.5 °F (36.4 °C) (Oral)   Resp 16   Ht 5' 3\" (1.6 m)   Wt 200 lb (90.7 kg)   SpO2 99%   BMI 35.43 kg/m²   Oxygen Saturation Interpretation: Normal      ------------------------------------------ PROGRESS NOTES ------------------------------------------  I have spoken with the patient and discussed todays results, in addition to providing specific details for the plan of care and counseling regarding the diagnosis and prognosis. Their questions are answered at this time and they are agreeable with the plan. I discussed at length with them reasons for immediate return here for re evaluation. They will followup with primary care by calling their office tomorrow. --------------------------------- ADDITIONAL PROVIDER NOTES ---------------------------------  At this time the patient is without objective evidence of an acute process requiring hospitalization or inpatient management. They have remained hemodynamically stable throughout their entire ED visit and are stable for discharge with outpatient follow-up. The plan has been discussed in detail and they are aware of the specific conditions for emergent return, as well as the importance of follow-up. New Prescriptions    No medications on file       Diagnosis:  1. Chronic nonintractable headache, unspecified headache type    2. Uncontrolled hypertension        Disposition:  Patient's disposition: Discharge to home  Patient's condition is stable.          [address] [facility],   01/31/23 [id number]

## 2023-05-13 ENCOUNTER — HOSPITAL ENCOUNTER (EMERGENCY)
Age: 53
Discharge: HOME | End: 2023-05-13

## 2023-05-13 ENCOUNTER — APPOINTMENT (OUTPATIENT)
Dept: GENERAL RADIOLOGY | Age: 53
End: 2023-05-13

## 2023-05-13 VITALS
RESPIRATION RATE: 18 BRPM | WEIGHT: 200 LBS | HEART RATE: 84 BPM | SYSTOLIC BLOOD PRESSURE: 129 MMHG | DIASTOLIC BLOOD PRESSURE: 96 MMHG | BODY MASS INDEX: 35.43 KG/M2 | TEMPERATURE: 98.2 F | OXYGEN SATURATION: 96 %

## 2023-05-13 DIAGNOSIS — S63.609A SPRAIN OF THUMB, UNSPECIFIED LATERALITY, UNSPECIFIED SITE OF DIGIT, INITIAL ENCOUNTER: Primary | ICD-10-CM

## 2023-05-13 PROCEDURE — 99211 OFF/OP EST MAY X REQ PHY/QHP: CPT

## 2023-05-13 PROCEDURE — 73130 X-RAY EXAM OF HAND: CPT

## 2023-05-13 RX ORDER — LISINOPRIL 2.5 MG/1
2.5 TABLET ORAL DAILY
COMMUNITY

## 2023-05-13 ASSESSMENT — PAIN - FUNCTIONAL ASSESSMENT: PAIN_FUNCTIONAL_ASSESSMENT: 0-10

## 2023-05-13 ASSESSMENT — PAIN SCALES - GENERAL: PAINLEVEL_OUTOF10: 0

## 2023-05-13 NOTE — DISCHARGE INSTRUCTIONS
Your xray is not read yet. You can check on My chart for the results and I can also call you at home regarding the results,  You can also follow up with the orthopedic Dr for evaluation of this.
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
## 2023-12-04 ENCOUNTER — HOSPITAL ENCOUNTER (OUTPATIENT)
Age: 53
Discharge: HOME | End: 2023-12-04
Payer: COMMERCIAL

## 2023-12-04 LAB
ANION GAP SERPL CALCULATED.3IONS-SCNC: 14 MMOL/L (ref 7–16)
BUN SERPL-MCNC: 17 MG/DL (ref 6–20)
CALCIUM SERPL-MCNC: 9.3 MG/DL (ref 8.6–10.2)
CHLORIDE SERPL-SCNC: 98 MMOL/L (ref 98–107)
CHOLEST SERPL-MCNC: 347 MG/DL
CO2 SERPL-SCNC: 28 MMOL/L (ref 22–29)
CREAT SERPL-MCNC: 1.2 MG/DL (ref 0.5–1)
GFR SERPL CREATININE-BSD FRML MDRD: 53 ML/MIN/1.73M2
GLUCOSE SERPL-MCNC: 208 MG/DL (ref 74–99)
HBA1C MFR BLD: 8.6 % (ref 4–5.6)
HDLC SERPL-MCNC: 43 MG/DL
LDLC SERPL CALC-MCNC: ABNORMAL MG/DL
POTASSIUM SERPL-SCNC: 4.2 MMOL/L (ref 3.5–5)
SODIUM SERPL-SCNC: 140 MMOL/L (ref 132–146)
TRIGL SERPL-MCNC: 707 MG/DL
VLDLC SERPL CALC-MCNC: ABNORMAL MG/DL

## 2023-12-04 PROCEDURE — 80061 LIPID PANEL: CPT

## 2023-12-04 PROCEDURE — 80048 BASIC METABOLIC PNL TOTAL CA: CPT

## 2023-12-04 PROCEDURE — 83036 HEMOGLOBIN GLYCOSYLATED A1C: CPT

## 2023-12-04 PROCEDURE — 36415 COLL VENOUS BLD VENIPUNCTURE: CPT

## 2025-07-02 ENCOUNTER — TRANSCRIBE ORDERS (OUTPATIENT)
Dept: ADMINISTRATIVE | Age: 55
End: 2025-07-02

## 2025-07-02 DIAGNOSIS — M75.31 CALCIFIC TENDINITIS OF RIGHT SHOULDER: Primary | ICD-10-CM

## 2025-07-31 ENCOUNTER — HOSPITAL ENCOUNTER (OUTPATIENT)
Dept: MRI IMAGING | Age: 55
Discharge: HOME | End: 2025-08-02
Payer: COMMERCIAL

## 2025-07-31 DIAGNOSIS — M75.31 CALCIFIC TENDINITIS OF RIGHT SHOULDER: ICD-10-CM

## 2025-07-31 PROCEDURE — 73221 MRI JOINT UPR EXTREM W/O DYE: CPT

## 2025-09-05 ENCOUNTER — TRANSCRIBE ORDERS (OUTPATIENT)
Dept: ADMINISTRATIVE | Age: 55
End: 2025-09-05

## 2025-09-05 DIAGNOSIS — Z12.31 ENCOUNTER FOR SCREENING MAMMOGRAM FOR MALIGNANT NEOPLASM OF BREAST: Primary | ICD-10-CM

## (undated) DEVICE — KIT BEDSIDE REVITAL OX 500ML

## (undated) DEVICE — Device: Brand: DEFENDO VALVE AND CONNECTOR KIT

## (undated) DEVICE — RETRIEVAL BALLOON CATHETER: Brand: EXTRACTOR™ PRO RX

## (undated) DEVICE — SPHINCTEROTOME: Brand: JAGTOME RX 39

## (undated) DEVICE — GOWN ISOLATN REG YEL M WT MULTIPLY SIDETIE LEV 2

## (undated) DEVICE — BALLOON DILATATION CATHETER: Brand: HURRICANE™ RX

## (undated) DEVICE — SPHINCTEROTOME: Brand: HYDRATOME RX 44

## (undated) DEVICE — TOWEL,OR,DSP,ST,BLUE,STD,6/PK,12PK/CS: Brand: MEDLINE

## (undated) DEVICE — TUBING, SUCTION, 1/4" X 10', STRAIGHT: Brand: MEDLINE

## (undated) DEVICE — SPONGE GZ 4IN 4IN 4 PLY N WVN AVANT

## (undated) DEVICE — SNARE ENDOSCP L240CM SHTH DIA2.4MM LOOP W30MM MIN WRK CHN

## (undated) DEVICE — COVER,LIGHT HANDLE,FLX,1/PK: Brand: MEDLINE INDUSTRIES, INC.

## (undated) DEVICE — PATIENT RETURN ELECTRODE, SINGLE-USE, CONTACT QUALITY MONITORING, ADULT, WITH 9FT CORD, FOR PATIENTS WEIGING OVER 33LBS. (15KG): Brand: MEGADYNE

## (undated) DEVICE — FINISHING WIRE: Brand: FINISHING WIRE® SUPPORTRAK®

## (undated) DEVICE — BALLOON REPROCESS INFLAT DEVICE ENCORE 26

## (undated) DEVICE — CONTAINER SPEC COLL 960ML POLYPR TRIANG GRAD INTAKE/OUTPUT

## (undated) DEVICE — ELECTRODE PT RET AD L9FT HI MOIST COND ADH HYDRGEL CORDED